# Patient Record
Sex: MALE | Race: WHITE | NOT HISPANIC OR LATINO | Employment: OTHER | URBAN - METROPOLITAN AREA
[De-identification: names, ages, dates, MRNs, and addresses within clinical notes are randomized per-mention and may not be internally consistent; named-entity substitution may affect disease eponyms.]

---

## 2017-01-02 ENCOUNTER — HOSPITAL ENCOUNTER (OUTPATIENT)
Dept: RADIOLOGY | Facility: HOSPITAL | Age: 72
Discharge: HOME | End: 2017-01-02
Attending: SURGERY
Payer: MEDICARE

## 2017-01-02 ENCOUNTER — APPOINTMENT (OUTPATIENT)
Dept: LAB | Facility: HOSPITAL | Age: 72
End: 2017-01-02
Attending: SURGERY
Payer: MEDICARE

## 2017-01-02 ENCOUNTER — TRANSCRIBE ORDERS (OUTPATIENT)
Dept: LAB | Facility: HOSPITAL | Age: 72
End: 2017-01-02

## 2017-01-02 ENCOUNTER — GENERIC CONVERSION - ENCOUNTER (OUTPATIENT)
Dept: OTHER | Facility: OTHER | Age: 72
End: 2017-01-02

## 2017-01-02 DIAGNOSIS — I71.4 ABDOMINAL AORTIC ANEURYSM WITHOUT RUPTURE (HCC): Primary | ICD-10-CM

## 2017-01-02 DIAGNOSIS — I71.4 ABDOMINAL AORTIC ANEURYSM WITHOUT RUPTURE (HCC): ICD-10-CM

## 2017-01-02 LAB
ABO GROUP BLD: NORMAL
ANION GAP SERPL CALCULATED.3IONS-SCNC: 7 MMOL/L (ref 4–13)
ATRIAL RATE: 48 BPM
ATRIAL RATE: 51 BPM
BLD GP AB SCN SERPL QL: NEGATIVE
BUN SERPL-MCNC: 19 MG/DL (ref 5–25)
CALCIUM SERPL-MCNC: 9.3 MG/DL (ref 8.3–10.1)
CHLORIDE SERPL-SCNC: 103 MMOL/L (ref 100–108)
CO2 SERPL-SCNC: 31 MMOL/L (ref 21–32)
CREAT SERPL-MCNC: 0.86 MG/DL (ref 0.6–1.3)
ERYTHROCYTE [DISTWIDTH] IN BLOOD BY AUTOMATED COUNT: 12.4 % (ref 11.6–15.1)
GFR SERPL CREATININE-BSD FRML MDRD: >60 ML/MIN/1.73SQ M
GLUCOSE SERPL-MCNC: 102 MG/DL (ref 65–140)
HCT VFR BLD AUTO: 51.1 % (ref 36.5–49.3)
HGB BLD-MCNC: 17.9 G/DL (ref 12–17)
INR PPP: 0.95 (ref 0.86–1.16)
MCH RBC QN AUTO: 32.4 PG (ref 26.8–34.3)
MCHC RBC AUTO-ENTMCNC: 35 G/DL (ref 31.4–37.4)
MCV RBC AUTO: 93 FL (ref 82–98)
P AXIS: 43 DEGREES
P AXIS: 53 DEGREES
PLATELET # BLD AUTO: 167 THOUSANDS/UL (ref 149–390)
PMV BLD AUTO: 9.7 FL (ref 8.9–12.7)
POTASSIUM SERPL-SCNC: 3.9 MMOL/L (ref 3.5–5.3)
PR INTERVAL: 176 MS
PR INTERVAL: 178 MS
PROTHROMBIN TIME: 12.8 SECONDS (ref 12–14.3)
QRS AXIS: 73 DEGREES
QRS AXIS: 87 DEGREES
QRSD INTERVAL: 132 MS
QRSD INTERVAL: 134 MS
QT INTERVAL: 444 MS
QT INTERVAL: 452 MS
QTC INTERVAL: 403 MS
QTC INTERVAL: 409 MS
RBC # BLD AUTO: 5.52 MILLION/UL (ref 3.88–5.62)
RH BLD: NEGATIVE
SODIUM SERPL-SCNC: 141 MMOL/L (ref 136–145)
T WAVE AXIS: 21 DEGREES
T WAVE AXIS: 26 DEGREES
VENTRICULAR RATE: 48 BPM
VENTRICULAR RATE: 51 BPM
WBC # BLD AUTO: 7.34 THOUSAND/UL (ref 4.31–10.16)

## 2017-01-02 PROCEDURE — 36415 COLL VENOUS BLD VENIPUNCTURE: CPT

## 2017-01-02 PROCEDURE — 86901 BLOOD TYPING SEROLOGIC RH(D): CPT

## 2017-01-02 PROCEDURE — 85610 PROTHROMBIN TIME: CPT

## 2017-01-02 PROCEDURE — 80048 BASIC METABOLIC PNL TOTAL CA: CPT

## 2017-01-02 PROCEDURE — 86900 BLOOD TYPING SEROLOGIC ABO: CPT

## 2017-01-02 PROCEDURE — 86850 RBC ANTIBODY SCREEN: CPT

## 2017-01-02 PROCEDURE — 93005 ELECTROCARDIOGRAM TRACING: CPT

## 2017-01-02 PROCEDURE — 85027 COMPLETE CBC AUTOMATED: CPT

## 2017-01-02 PROCEDURE — 71020 HB CHEST X-RAY 2VW FRONTAL&LATL: CPT

## 2017-01-05 RX ORDER — HYDROCHLOROTHIAZIDE 25 MG/1
25 TABLET ORAL DAILY
COMMUNITY

## 2017-01-05 RX ORDER — LISINOPRIL 10 MG/1
TABLET ORAL
COMMUNITY
Start: 2015-11-12

## 2017-01-05 RX ORDER — ATORVASTATIN CALCIUM 40 MG/1
40 TABLET, FILM COATED ORAL DAILY
COMMUNITY

## 2017-01-05 RX ORDER — OXYBUTYNIN CHLORIDE 5 MG/1
5 TABLET ORAL 2 TIMES DAILY
COMMUNITY
End: 2019-09-17 | Stop reason: ALTCHOICE

## 2017-01-05 RX ORDER — ASPIRIN 81 MG/1
81 TABLET, CHEWABLE ORAL DAILY
COMMUNITY

## 2017-01-05 RX ORDER — GLIPIZIDE 10 MG/1
10 TABLET, FILM COATED, EXTENDED RELEASE ORAL DAILY
Status: ON HOLD | COMMUNITY
End: 2017-01-06

## 2017-01-06 ENCOUNTER — HOSPITAL ENCOUNTER (INPATIENT)
Facility: HOSPITAL | Age: 72
LOS: 1 days | Discharge: HOME | DRG: 269 | End: 2017-01-07
Attending: SURGERY | Admitting: SURGERY
Payer: MEDICARE

## 2017-01-06 ENCOUNTER — ANESTHESIA (OUTPATIENT)
Dept: PERIOP | Facility: HOSPITAL | Age: 72
DRG: 269 | End: 2017-01-06
Payer: MEDICARE

## 2017-01-06 ENCOUNTER — HOSPITAL ENCOUNTER (OUTPATIENT)
Dept: RADIOLOGY | Facility: HOSPITAL | Age: 72
Discharge: HOME | DRG: 269 | End: 2017-01-06
Attending: SURGERY
Payer: MEDICARE

## 2017-01-06 ENCOUNTER — ANESTHESIA EVENT (OUTPATIENT)
Dept: PERIOP | Facility: HOSPITAL | Age: 72
DRG: 269 | End: 2017-01-06
Payer: MEDICARE

## 2017-01-06 ENCOUNTER — GENERIC CONVERSION - ENCOUNTER (OUTPATIENT)
Dept: OTHER | Facility: OTHER | Age: 72
End: 2017-01-06

## 2017-01-06 DIAGNOSIS — I71.4 ABDOMINAL AORTIC ANEURYSM WITHOUT RUPTURE (HCC): ICD-10-CM

## 2017-01-06 PROBLEM — I71.40 AAA (ABDOMINAL AORTIC ANEURYSM): Status: ACTIVE | Noted: 2017-01-06

## 2017-01-06 LAB
GLUCOSE SERPL-MCNC: 81 MG/DL (ref 65–140)
GLUCOSE SERPL-MCNC: 95 MG/DL (ref 65–140)

## 2017-01-06 PROCEDURE — C1769 GUIDE WIRE: HCPCS | Performed by: SURGERY

## 2017-01-06 PROCEDURE — 75952 HB ENDOVASC REPAIR ABDOM AORTA: CPT

## 2017-01-06 PROCEDURE — C1760 CLOSURE DEV, VASC: HCPCS | Performed by: SURGERY

## 2017-01-06 PROCEDURE — C1874 STENT, COATED/COV W/DEL SYS: HCPCS | Performed by: SURGERY

## 2017-01-06 PROCEDURE — 04V03DZ RESTRICTION OF ABDOMINAL AORTA WITH INTRALUMINAL DEVICE, PERCUTANEOUS APPROACH: ICD-10-PCS | Performed by: SURGERY

## 2017-01-06 PROCEDURE — 82948 REAGENT STRIP/BLOOD GLUCOSE: CPT

## 2017-01-06 PROCEDURE — C1894 INTRO/SHEATH, NON-LASER: HCPCS | Performed by: SURGERY

## 2017-01-06 PROCEDURE — C2628 CATHETER, OCCLUSION: HCPCS | Performed by: SURGERY

## 2017-01-06 DEVICE — IMPLANTABLE DEVICE: Type: IMPLANTABLE DEVICE | Site: AORTA | Status: FUNCTIONAL

## 2017-01-06 RX ORDER — BUPIVACAINE HYDROCHLORIDE AND EPINEPHRINE 5; 5 MG/ML; UG/ML
INJECTION, SOLUTION PERINEURAL AS NEEDED
Status: DISCONTINUED | OUTPATIENT
Start: 2017-01-06 | End: 2017-01-06 | Stop reason: HOSPADM

## 2017-01-06 RX ORDER — HEPARIN SODIUM 1000 [USP'U]/ML
INJECTION, SOLUTION INTRAVENOUS; SUBCUTANEOUS AS NEEDED
Status: DISCONTINUED | OUTPATIENT
Start: 2017-01-06 | End: 2017-01-06 | Stop reason: SURG

## 2017-01-06 RX ORDER — ONDANSETRON 2 MG/ML
4 INJECTION INTRAMUSCULAR; INTRAVENOUS EVERY 8 HOURS PRN
Status: DISCONTINUED | OUTPATIENT
Start: 2017-01-06 | End: 2017-01-07 | Stop reason: HOSPADM

## 2017-01-06 RX ORDER — GLYCOPYRROLATE 0.2 MG/ML
INJECTION INTRAMUSCULAR; INTRAVENOUS AS NEEDED
Status: DISCONTINUED | OUTPATIENT
Start: 2017-01-06 | End: 2017-01-06 | Stop reason: SURG

## 2017-01-06 RX ORDER — LISINOPRIL 10 MG/1
10 TABLET ORAL DAILY
Status: DISCONTINUED | OUTPATIENT
Start: 2017-01-07 | End: 2017-01-07 | Stop reason: HOSPADM

## 2017-01-06 RX ORDER — ATORVASTATIN CALCIUM 40 MG/1
40 TABLET, FILM COATED ORAL
Status: DISCONTINUED | OUTPATIENT
Start: 2017-01-07 | End: 2017-01-07 | Stop reason: HOSPADM

## 2017-01-06 RX ORDER — SODIUM CHLORIDE 450 MG/100ML
100 INJECTION, SOLUTION INTRAVENOUS CONTINUOUS
Status: DISCONTINUED | OUTPATIENT
Start: 2017-01-06 | End: 2017-01-06

## 2017-01-06 RX ORDER — PROPOFOL 10 MG/ML
INJECTION, EMULSION INTRAVENOUS AS NEEDED
Status: DISCONTINUED | OUTPATIENT
Start: 2017-01-06 | End: 2017-01-06 | Stop reason: SURG

## 2017-01-06 RX ORDER — OXYBUTYNIN CHLORIDE 5 MG/1
5 TABLET ORAL 2 TIMES DAILY
Status: DISCONTINUED | OUTPATIENT
Start: 2017-01-06 | End: 2017-01-07 | Stop reason: HOSPADM

## 2017-01-06 RX ORDER — OXYCODONE HYDROCHLORIDE 5 MG/1
10 TABLET ORAL EVERY 4 HOURS PRN
Status: DISCONTINUED | OUTPATIENT
Start: 2017-01-06 | End: 2017-01-07 | Stop reason: HOSPADM

## 2017-01-06 RX ORDER — FENTANYL CITRATE/PF 50 MCG/ML
50 SYRINGE (ML) INJECTION
Status: DISCONTINUED | OUTPATIENT
Start: 2017-01-06 | End: 2017-01-06 | Stop reason: HOSPADM

## 2017-01-06 RX ORDER — ONDANSETRON 2 MG/ML
4 INJECTION INTRAMUSCULAR; INTRAVENOUS EVERY 4 HOURS PRN
Status: DISCONTINUED | OUTPATIENT
Start: 2017-01-06 | End: 2017-01-06 | Stop reason: HOSPADM

## 2017-01-06 RX ORDER — OXYCODONE HYDROCHLORIDE 5 MG/1
5 TABLET ORAL EVERY 4 HOURS PRN
Status: DISCONTINUED | OUTPATIENT
Start: 2017-01-06 | End: 2017-01-07 | Stop reason: HOSPADM

## 2017-01-06 RX ORDER — ROCURONIUM BROMIDE 10 MG/ML
INJECTION, SOLUTION INTRAVENOUS AS NEEDED
Status: DISCONTINUED | OUTPATIENT
Start: 2017-01-06 | End: 2017-01-06 | Stop reason: SURG

## 2017-01-06 RX ORDER — SODIUM CHLORIDE 450 MG/100ML
100 INJECTION, SOLUTION INTRAVENOUS CONTINUOUS
Status: DISPENSED | OUTPATIENT
Start: 2017-01-06 | End: 2017-01-07

## 2017-01-06 RX ORDER — EPHEDRINE SULFATE 50 MG/ML
INJECTION, SOLUTION INTRAVENOUS AS NEEDED
Status: DISCONTINUED | OUTPATIENT
Start: 2017-01-06 | End: 2017-01-06 | Stop reason: SURG

## 2017-01-06 RX ORDER — FENTANYL CITRATE 50 UG/ML
INJECTION, SOLUTION INTRAMUSCULAR; INTRAVENOUS AS NEEDED
Status: DISCONTINUED | OUTPATIENT
Start: 2017-01-06 | End: 2017-01-06 | Stop reason: SURG

## 2017-01-06 RX ORDER — PROTAMINE SULFATE 10 MG/ML
INJECTION, SOLUTION INTRAVENOUS AS NEEDED
Status: DISCONTINUED | OUTPATIENT
Start: 2017-01-06 | End: 2017-01-06 | Stop reason: SURG

## 2017-01-06 RX ORDER — HYDROCHLOROTHIAZIDE 12.5 MG/1
25 TABLET ORAL DAILY
Status: DISCONTINUED | OUTPATIENT
Start: 2017-01-07 | End: 2017-01-07 | Stop reason: HOSPADM

## 2017-01-06 RX ORDER — HEPARIN SODIUM 5000 [USP'U]/ML
5000 INJECTION, SOLUTION INTRAVENOUS; SUBCUTANEOUS EVERY 8 HOURS SCHEDULED
Status: DISCONTINUED | OUTPATIENT
Start: 2017-01-06 | End: 2017-01-07 | Stop reason: HOSPADM

## 2017-01-06 RX ORDER — LIDOCAINE HYDROCHLORIDE 10 MG/ML
INJECTION, SOLUTION INFILTRATION; PERINEURAL AS NEEDED
Status: DISCONTINUED | OUTPATIENT
Start: 2017-01-06 | End: 2017-01-06 | Stop reason: SURG

## 2017-01-06 RX ORDER — ACETAMINOPHEN 325 MG/1
650 TABLET ORAL EVERY 6 HOURS PRN
Status: DISCONTINUED | OUTPATIENT
Start: 2017-01-06 | End: 2017-01-07 | Stop reason: HOSPADM

## 2017-01-06 RX ORDER — ONDANSETRON 2 MG/ML
INJECTION INTRAMUSCULAR; INTRAVENOUS AS NEEDED
Status: DISCONTINUED | OUTPATIENT
Start: 2017-01-06 | End: 2017-01-06 | Stop reason: SURG

## 2017-01-06 RX ORDER — ASPIRIN 81 MG/1
81 TABLET, CHEWABLE ORAL ONCE
Status: COMPLETED | OUTPATIENT
Start: 2017-01-06 | End: 2017-01-06

## 2017-01-06 RX ORDER — SODIUM CHLORIDE, SODIUM LACTATE, POTASSIUM CHLORIDE, CALCIUM CHLORIDE 600; 310; 30; 20 MG/100ML; MG/100ML; MG/100ML; MG/100ML
50 INJECTION, SOLUTION INTRAVENOUS CONTINUOUS
Status: DISCONTINUED | OUTPATIENT
Start: 2017-01-06 | End: 2017-01-06

## 2017-01-06 RX ORDER — SODIUM CHLORIDE 9 MG/ML
100 INJECTION, SOLUTION INTRAVENOUS CONTINUOUS
Status: DISCONTINUED | OUTPATIENT
Start: 2017-01-06 | End: 2017-01-07 | Stop reason: HOSPADM

## 2017-01-06 RX ORDER — OXYCODONE HYDROCHLORIDE AND ACETAMINOPHEN 5; 325 MG/1; MG/1
1 TABLET ORAL EVERY 4 HOURS PRN
Status: DISCONTINUED | OUTPATIENT
Start: 2017-01-06 | End: 2017-01-06

## 2017-01-06 RX ADMIN — ROCURONIUM BROMIDE 50 MG: 10 INJECTION, SOLUTION INTRAVENOUS at 16:37

## 2017-01-06 RX ADMIN — PROPOFOL 200 MG: 10 INJECTION, EMULSION INTRAVENOUS at 16:36

## 2017-01-06 RX ADMIN — EPHEDRINE SULFATE 10 MG: 50 INJECTION, SOLUTION INTRAMUSCULAR; INTRAVENOUS; SUBCUTANEOUS at 16:42

## 2017-01-06 RX ADMIN — EPHEDRINE SULFATE 10 MG: 50 INJECTION, SOLUTION INTRAMUSCULAR; INTRAVENOUS; SUBCUTANEOUS at 16:43

## 2017-01-06 RX ADMIN — HEPARIN SODIUM 5000 UNITS: 5000 INJECTION, SOLUTION INTRAVENOUS; SUBCUTANEOUS at 23:59

## 2017-01-06 RX ADMIN — OXYBUTYNIN CHLORIDE 5 MG: 5 TABLET ORAL at 23:59

## 2017-01-06 RX ADMIN — ROCURONIUM BROMIDE 20 MG: 10 INJECTION, SOLUTION INTRAVENOUS at 16:50

## 2017-01-06 RX ADMIN — EPHEDRINE SULFATE 10 MG: 50 INJECTION, SOLUTION INTRAMUSCULAR; INTRAVENOUS; SUBCUTANEOUS at 16:41

## 2017-01-06 RX ADMIN — LIDOCAINE HYDROCHLORIDE 50 MG: 10 INJECTION, SOLUTION INFILTRATION; PERINEURAL at 16:36

## 2017-01-06 RX ADMIN — PROPOFOL 50 MG: 10 INJECTION, EMULSION INTRAVENOUS at 16:37

## 2017-01-06 RX ADMIN — HEPARIN SODIUM 7000 UNITS: 1000 INJECTION INTRAVENOUS; SUBCUTANEOUS at 17:08

## 2017-01-06 RX ADMIN — PROTAMINE SULFATE 30 MG: 10 INJECTION, SOLUTION INTRAVENOUS at 18:16

## 2017-01-06 RX ADMIN — SODIUM CHLORIDE, SODIUM LACTATE, POTASSIUM CHLORIDE, AND CALCIUM CHLORIDE 50 ML/HR: .6; .31; .03; .02 INJECTION, SOLUTION INTRAVENOUS at 12:23

## 2017-01-06 RX ADMIN — FENTANYL CITRATE 50 MCG: 50 INJECTION, SOLUTION INTRAMUSCULAR; INTRAVENOUS at 18:53

## 2017-01-06 RX ADMIN — FENTANYL CITRATE 100 MCG: 50 INJECTION, SOLUTION INTRAMUSCULAR; INTRAVENOUS at 16:36

## 2017-01-06 RX ADMIN — SODIUM CHLORIDE 100 ML/HR: 0.9 INJECTION, SOLUTION INTRAVENOUS at 20:00

## 2017-01-06 RX ADMIN — PROPOFOL 30 MG: 10 INJECTION, EMULSION INTRAVENOUS at 16:48

## 2017-01-06 RX ADMIN — GLYCOPYRROLATE 0.4 MG: 0.2 INJECTION INTRAMUSCULAR; INTRAVENOUS at 18:14

## 2017-01-06 RX ADMIN — SODIUM CHLORIDE: 0.45 INJECTION, SOLUTION INTRAVENOUS at 16:33

## 2017-01-06 RX ADMIN — FENTANYL CITRATE 25 MCG: 50 INJECTION, SOLUTION INTRAMUSCULAR; INTRAVENOUS at 16:48

## 2017-01-06 RX ADMIN — FENTANYL CITRATE 50 MCG: 50 INJECTION, SOLUTION INTRAMUSCULAR; INTRAVENOUS at 19:00

## 2017-01-06 RX ADMIN — FENTANYL CITRATE 50 MCG: 50 INJECTION, SOLUTION INTRAMUSCULAR; INTRAVENOUS at 18:20

## 2017-01-06 RX ADMIN — CEFAZOLIN SODIUM 2000 MG: 1 SOLUTION INTRAVENOUS at 16:50

## 2017-01-06 RX ADMIN — PROTAMINE SULFATE 10 MG: 10 INJECTION, SOLUTION INTRAVENOUS at 18:20

## 2017-01-06 RX ADMIN — ONDANSETRON 4 MG: 2 INJECTION INTRAMUSCULAR; INTRAVENOUS at 18:14

## 2017-01-06 RX ADMIN — NEOSTIGMINE METHYLSULFATE 2 MG: 1 INJECTION INTRAMUSCULAR; INTRAVENOUS; SUBCUTANEOUS at 18:14

## 2017-01-06 RX ADMIN — ASPIRIN 81 MG 81 MG: 81 TABLET ORAL at 23:59

## 2017-01-07 VITALS
WEIGHT: 176 LBS | OXYGEN SATURATION: 95 % | SYSTOLIC BLOOD PRESSURE: 118 MMHG | TEMPERATURE: 99.9 F | DIASTOLIC BLOOD PRESSURE: 61 MMHG | HEIGHT: 69 IN | HEART RATE: 68 BPM | RESPIRATION RATE: 18 BRPM | BODY MASS INDEX: 26.07 KG/M2

## 2017-01-07 LAB
PLATELET # BLD AUTO: 119 THOUSANDS/UL (ref 149–390)
PMV BLD AUTO: 9.4 FL (ref 8.9–12.7)

## 2017-01-07 PROCEDURE — 85049 AUTOMATED PLATELET COUNT: CPT | Performed by: SURGERY

## 2017-01-07 RX ADMIN — HEPARIN SODIUM 5000 UNITS: 5000 INJECTION, SOLUTION INTRAVENOUS; SUBCUTANEOUS at 06:34

## 2017-01-07 RX ADMIN — LISINOPRIL 10 MG: 10 TABLET ORAL at 08:22

## 2017-01-07 RX ADMIN — SODIUM CHLORIDE 100 ML/HR: 0.9 INJECTION, SOLUTION INTRAVENOUS at 05:41

## 2017-01-07 RX ADMIN — HYDROCHLOROTHIAZIDE 25 MG: 12.5 TABLET ORAL at 08:22

## 2017-01-07 RX ADMIN — OXYBUTYNIN CHLORIDE 5 MG: 5 TABLET ORAL at 08:22

## 2017-01-12 ENCOUNTER — GENERIC CONVERSION - ENCOUNTER (OUTPATIENT)
Dept: OTHER | Facility: OTHER | Age: 72
End: 2017-01-12

## 2017-01-16 ENCOUNTER — ALLSCRIPTS OFFICE VISIT (OUTPATIENT)
Dept: OTHER | Facility: OTHER | Age: 72
End: 2017-01-16

## 2017-01-16 ENCOUNTER — TRANSCRIBE ORDERS (OUTPATIENT)
Dept: ADMINISTRATIVE | Facility: HOSPITAL | Age: 72
End: 2017-01-16

## 2017-01-16 DIAGNOSIS — Z95.828 BLOOD VESSEL REPLACED BY OTHER MEANS: Primary | ICD-10-CM

## 2017-02-06 ENCOUNTER — HOSPITAL ENCOUNTER (OUTPATIENT)
Dept: RADIOLOGY | Facility: HOSPITAL | Age: 72
Discharge: HOME | End: 2017-02-06
Attending: SURGERY
Payer: MEDICARE

## 2017-02-06 DIAGNOSIS — Z95.828 PRESENCE OF OTHER VASCULAR IMPLANTS AND GRAFTS: ICD-10-CM

## 2017-02-06 DIAGNOSIS — Z95.828 BLOOD VESSEL REPLACED BY OTHER MEANS: ICD-10-CM

## 2017-02-06 PROCEDURE — 74174 CTA ABD&PLVS W/CONTRAST: CPT

## 2017-02-06 RX ADMIN — IOHEXOL 100 ML: 350 INJECTION, SOLUTION INTRAVENOUS at 13:49

## 2017-02-07 LAB
ATRIAL RATE: 51 BPM
P AXIS: 53 DEGREES
PR INTERVAL: 176 MS
QRS AXIS: 87 DEGREES
QRSD INTERVAL: 132 MS
QT INTERVAL: 444 MS
QTC INTERVAL: 409 MS
T WAVE AXIS: 21 DEGREES
VENTRICULAR RATE: 51 BPM

## 2017-02-10 ENCOUNTER — GENERIC CONVERSION - ENCOUNTER (OUTPATIENT)
Dept: OTHER | Facility: OTHER | Age: 72
End: 2017-02-10

## 2017-04-10 ENCOUNTER — HOSPITAL ENCOUNTER (OUTPATIENT)
Dept: NON INVASIVE DIAGNOSTICS | Facility: CLINIC | Age: 72
Discharge: HOME | End: 2017-04-10
Payer: MEDICARE

## 2017-04-10 DIAGNOSIS — Z95.828 PRESENCE OF OTHER VASCULAR IMPLANTS AND GRAFTS: ICD-10-CM

## 2017-04-10 PROCEDURE — 93978 VASCULAR STUDY: CPT

## 2017-05-31 ENCOUNTER — ALLSCRIPTS OFFICE VISIT (OUTPATIENT)
Dept: OTHER | Facility: OTHER | Age: 72
End: 2017-05-31

## 2017-06-07 ENCOUNTER — HOSPITAL ENCOUNTER (OUTPATIENT)
Dept: NON INVASIVE DIAGNOSTICS | Facility: CLINIC | Age: 72
Discharge: HOME | End: 2017-06-07
Payer: MEDICARE

## 2017-06-07 DIAGNOSIS — Z95.2 PRESENCE OF PROSTHETIC HEART VALVE: ICD-10-CM

## 2017-06-07 DIAGNOSIS — E78.5 HYPERLIPIDEMIA: ICD-10-CM

## 2017-06-07 DIAGNOSIS — E78.00 PURE HYPERCHOLESTEROLEMIA: ICD-10-CM

## 2017-06-07 DIAGNOSIS — I25.10 ATHEROSCLEROTIC HEART DISEASE OF NATIVE CORONARY ARTERY WITHOUT ANGINA PECTORIS: ICD-10-CM

## 2017-06-07 PROCEDURE — 93306 TTE W/DOPPLER COMPLETE: CPT

## 2017-07-24 ENCOUNTER — ALLSCRIPTS OFFICE VISIT (OUTPATIENT)
Dept: OTHER | Facility: OTHER | Age: 72
End: 2017-07-24

## 2017-07-24 ENCOUNTER — TRANSCRIBE ORDERS (OUTPATIENT)
Dept: ADMINISTRATIVE | Facility: HOSPITAL | Age: 72
End: 2017-07-24

## 2017-07-24 DIAGNOSIS — I25.10 ATHEROSCLEROSIS OF NATIVE CORONARY ARTERY WITHOUT ANGINA PECTORIS, UNSPECIFIED WHETHER NATIVE OR TRANSPLANTED HEART: Primary | ICD-10-CM

## 2017-08-28 ENCOUNTER — HOSPITAL ENCOUNTER (OUTPATIENT)
Dept: NON INVASIVE DIAGNOSTICS | Facility: CLINIC | Age: 72
Discharge: HOME | End: 2017-08-28
Payer: MEDICARE

## 2017-08-28 DIAGNOSIS — Z95.828 PRESENCE OF OTHER VASCULAR IMPLANTS AND GRAFTS: ICD-10-CM

## 2017-08-28 PROCEDURE — 93978 VASCULAR STUDY: CPT

## 2017-10-30 ENCOUNTER — HOSPITAL ENCOUNTER (OUTPATIENT)
Dept: NON INVASIVE DIAGNOSTICS | Facility: CLINIC | Age: 72
Discharge: HOME | End: 2017-10-30
Payer: MEDICARE

## 2017-10-30 ENCOUNTER — TRANSCRIBE ORDERS (OUTPATIENT)
Dept: LAB | Facility: CLINIC | Age: 72
End: 2017-10-30

## 2017-10-30 ENCOUNTER — APPOINTMENT (OUTPATIENT)
Dept: LAB | Facility: CLINIC | Age: 72
End: 2017-10-30
Payer: MEDICARE

## 2017-10-30 DIAGNOSIS — R35.0 URINARY FREQUENCY: ICD-10-CM

## 2017-10-30 DIAGNOSIS — E78.5 HYPERLIPIDEMIA, UNSPECIFIED HYPERLIPIDEMIA TYPE: ICD-10-CM

## 2017-10-30 DIAGNOSIS — I71.4 ABDOMINAL AORTIC ANEURYSM WITHOUT RUPTURE (HCC): ICD-10-CM

## 2017-10-30 DIAGNOSIS — I25.10 ATHEROSCLEROSIS OF NATIVE CORONARY ARTERY, ANGINA PRESENCE UNSPECIFIED, UNSPECIFIED WHETHER NATIVE OR TRANSPLANTED HEART: ICD-10-CM

## 2017-10-30 DIAGNOSIS — F11.90 OPIATE USE: Primary | ICD-10-CM

## 2017-10-30 DIAGNOSIS — F11.90 OPIATE USE: ICD-10-CM

## 2017-10-30 LAB
ALBUMIN SERPL BCP-MCNC: 3.9 G/DL (ref 3.5–5)
ALP SERPL-CCNC: 62 U/L (ref 46–116)
ALT SERPL W P-5'-P-CCNC: 17 U/L (ref 12–78)
ANION GAP SERPL CALCULATED.3IONS-SCNC: 5 MMOL/L (ref 4–13)
AST SERPL W P-5'-P-CCNC: 9 U/L (ref 5–45)
BASOPHILS # BLD AUTO: 0 THOUSANDS/ΜL (ref 0–0.1)
BASOPHILS NFR BLD AUTO: 0 % (ref 0–1)
BILIRUB SERPL-MCNC: 0.5 MG/DL (ref 0.2–1)
BUN SERPL-MCNC: 21 MG/DL (ref 5–25)
CALCIUM SERPL-MCNC: 9.2 MG/DL (ref 8.3–10.1)
CHLORIDE SERPL-SCNC: 103 MMOL/L (ref 100–108)
CHOLEST SERPL-MCNC: 98 MG/DL (ref 50–200)
CO2 SERPL-SCNC: 33 MMOL/L (ref 21–32)
CREAT SERPL-MCNC: 0.8 MG/DL (ref 0.6–1.3)
CREAT UR-MCNC: 183 MG/DL
EOSINOPHIL # BLD AUTO: 0.22 THOUSAND/ΜL (ref 0–0.61)
EOSINOPHIL NFR BLD AUTO: 3 % (ref 0–6)
ERYTHROCYTE [DISTWIDTH] IN BLOOD BY AUTOMATED COUNT: 12.4 % (ref 11.6–15.1)
EST. AVERAGE GLUCOSE BLD GHB EST-MCNC: 128 MG/DL
GFR SERPL CREATININE-BSD FRML MDRD: 90 ML/MIN/1.73SQ M
GLUCOSE P FAST SERPL-MCNC: 123 MG/DL (ref 65–99)
HBA1C MFR BLD: 6.1 % (ref 4.2–6.3)
HCT VFR BLD AUTO: 49.4 % (ref 36.5–49.3)
HDLC SERPL-MCNC: 40 MG/DL (ref 40–60)
HGB BLD-MCNC: 16.8 G/DL (ref 12–17)
LDLC SERPL CALC-MCNC: 46 MG/DL (ref 0–100)
LYMPHOCYTES # BLD AUTO: 0.9 THOUSANDS/ΜL (ref 0.6–4.47)
LYMPHOCYTES NFR BLD AUTO: 13 % (ref 14–44)
MCH RBC QN AUTO: 31.5 PG (ref 26.8–34.3)
MCHC RBC AUTO-ENTMCNC: 34 G/DL (ref 31.4–37.4)
MCV RBC AUTO: 93 FL (ref 82–98)
MICROALBUMIN UR-MCNC: 26.1 MG/L (ref 0–20)
MICROALBUMIN/CREAT 24H UR: 14 MG/G CREATININE (ref 0–30)
MONOCYTES # BLD AUTO: 0.58 THOUSAND/ΜL (ref 0.17–1.22)
MONOCYTES NFR BLD AUTO: 8 % (ref 4–12)
NEUTROPHILS # BLD AUTO: 5.2 THOUSANDS/ΜL (ref 1.85–7.62)
NEUTS SEG NFR BLD AUTO: 76 % (ref 43–75)
PLATELET # BLD AUTO: 158 THOUSANDS/UL (ref 149–390)
PMV BLD AUTO: 9.4 FL (ref 8.9–12.7)
POTASSIUM SERPL-SCNC: 3.9 MMOL/L (ref 3.5–5.3)
PROT SERPL-MCNC: 6.8 G/DL (ref 6.4–8.2)
PSA SERPL-MCNC: 1.2 NG/ML (ref 0–4)
RBC # BLD AUTO: 5.34 MILLION/UL (ref 3.88–5.62)
SODIUM SERPL-SCNC: 141 MMOL/L (ref 136–145)
T4 FREE SERPL-MCNC: 1.24 NG/DL (ref 0.76–1.46)
TRIGL SERPL-MCNC: 58 MG/DL
TSH SERPL DL<=0.05 MIU/L-ACNC: 3.26 UIU/ML (ref 0.36–3.74)
WBC # BLD AUTO: 6.9 THOUSAND/UL (ref 4.31–10.16)

## 2017-10-30 PROCEDURE — 36415 COLL VENOUS BLD VENIPUNCTURE: CPT

## 2017-10-30 PROCEDURE — G0103 PSA SCREENING: HCPCS

## 2017-10-30 PROCEDURE — 84439 ASSAY OF FREE THYROXINE: CPT

## 2017-10-30 PROCEDURE — 83036 HEMOGLOBIN GLYCOSYLATED A1C: CPT

## 2017-10-30 PROCEDURE — 80053 COMPREHEN METABOLIC PANEL: CPT

## 2017-10-30 PROCEDURE — 86803 HEPATITIS C AB TEST: CPT

## 2017-10-30 PROCEDURE — 93978 VASCULAR STUDY: CPT

## 2017-10-30 PROCEDURE — 82043 UR ALBUMIN QUANTITATIVE: CPT | Performed by: INTERNAL MEDICINE

## 2017-10-30 PROCEDURE — 85025 COMPLETE CBC W/AUTO DIFF WBC: CPT

## 2017-10-30 PROCEDURE — 82570 ASSAY OF URINE CREATININE: CPT | Performed by: INTERNAL MEDICINE

## 2017-10-30 PROCEDURE — 80061 LIPID PANEL: CPT

## 2017-10-30 PROCEDURE — 84443 ASSAY THYROID STIM HORMONE: CPT

## 2017-10-31 DIAGNOSIS — I71.4 ABDOMINAL AORTIC ANEURYSM WITHOUT RUPTURE (HCC): ICD-10-CM

## 2017-10-31 LAB — HCV AB SER QL: NORMAL

## 2018-01-08 ENCOUNTER — ALLSCRIPTS OFFICE VISIT (OUTPATIENT)
Dept: OTHER | Facility: OTHER | Age: 73
End: 2018-01-08

## 2018-01-08 DIAGNOSIS — I77.1 STRICTURE OF ARTERY (HCC): ICD-10-CM

## 2018-01-09 NOTE — PROGRESS NOTES
Assessment   1  Cigarette nicotine dependence with nicotine-induced disorder (292 9) (F17 219)   2  Abdominal aortic aneurysm (AAA) >39 mm diameter (441 4) (I71 4)   3  Right iliac artery stenosis (447 1) (I77 1)    Plan   Cigarette nicotine dependence with nicotine-induced disorder    · You need to quit smoking ; Status:Complete;   Done: [id number]   Ordered; For:Cigarette nicotine dependence with nicotine-induced disorder; Ordered By:Rubina Zhao; History of repair of aneurysm of abdominal aorta using endovascular stent graft    · (1) BASIC METABOLIC PROFILE; Status:Active; Requested UDE:[id number];    Perform:[facility]; ZDC:[id number]; Last Updated By:Nunu Albert; 1/8/2018 10:58:35 AM;Ordered;For:History of repair of aneurysm of abdominal aorta using endovascular stent graft; Ordered By:Rubina Zhao;  Right iliac artery stenosis    · (1) BASIC METABOLIC PROFILE; Status:Active; Requested VLO:[id number]; Perform:[facility]; NMW:[id number]; Last Updated By:Nunu Albert; 1/8/2018 10:58:36 AM;Ordered; For:Right iliac artery stenosis; Ordered By:Rubina Zhao;   · (1) CBC/ PLT (NO DIFF); Status:Active; Requested LLV:[id number]; Perform:[facility]; FQY:[id number]; Last Updated By:Nunu Albert; 1/8/2018 10:58:36 AM;Ordered; For:Right iliac artery stenosis; Ordered By:Rubina Zhao;   · (1) PT WITH INR; Status:Active; Requested IRD:[id number]; Perform:[facility]; EVU:[id number]; Last Updated By:Nunu Albert; 1/8/2018 10:58:36 AM;Ordered; For:Right iliac artery stenosis; Ordered By:Rubina Zhao;   · ECG 12-LEAD; Status:Active; Requested RNF:[id number]; Perform:[facility]; UXM:[id number]; Last Updated By:Nunu Albert; 1/8/2018 10:58:36 AM;Ordered; For:Right iliac artery stenosis; Ordered By:Annette Zhao;   · Schedule Surgery Treatment  Procedure  Status: Hold For - Scheduling  Requested for:    [id number]   Ordered; For: Right iliac artery stenosis; Ordered By: Michael Hunt Performed:  Due: 44AAG3823; Last Updated By: Brandee Willson; 1/8/2018 11:19:08 AM    Discussion/Summary   Discussion Summary:    Status post endovascular aortic aneurysm repair on in January 2017  Zenith 16mm limb was placed in Both the right and left iliac arteries  He has had narrowing of the right limb at the distal aorta due to narrow diameter of the distal aorta  Over time the stenosis remains persistent  I had his case discussed in our multidisciplinary case conference and the consensus opinion was to treat this with additional bare-metal stenting at the aortic bifurcation to prevent any future complications of limb thrombosis  I explained the rationale of this procedure to the patient and he agrees  Plan to perform bilateral iliac angiograms with iliac angioplasty and stent  Risks of the procedure were discussed with the patient  Chief Complaint   Chief Complaint Free Text Note Form: I'm here to review my test results          History of Present Illness   HPI: Pt is here today to review EVAR Duplex done 10/30/17  Pt offers no complaints  Pt is taking aspirin daily  underwent endovascular aortic aneurysm repair in January 2017  He denies any claudication symptoms  He denies any rest pain in his legs  He denies any wounds in his legs  Recently has started smoking again about 10 cigarettes a day  He had quit for 6 years before  Review of Systems   Complete Male - Vasc:      Constitutional: No fever or chills, feels well, no tiredness, no recent weight gain or weight loss  Eyes: No sudden vision loss, no blurred vision, no double vision  ENT: no loss of hearing, no nosebleeds, no hoarseness  Cardiovascular: no painful veins,-- no leg pain with walking-- and-- no bleeding veins, but-- regular heart rate,-- no chest pain,-- no intermittent leg claudication-- and-- no palpitations        Respiratory: No sob, no wheezing, no cough, no sob with exertion, no orthopnea  Gastrointestinal: No nausea, No vomiting, no diarrhea, no blood in stool  Genitourinary: no dysuria, no hematuria, No urinary incontinence, no erectile dysfunction  Musculoskeletal: no limb pain, no limb swelling  Integumentary: no rash, no lesions, no wounds, no ulcer  Neurological: no dementia, no headache, no numbness, no limb weakness, no dizziness, no difficulty walking  Psychiatric: no depression, no mood disorders, no anxiety  Hematologic/Lymphatic: no bleeding disorder, no easy bruising  ROS Reviewed:    ROS reviewed  Active Problems   1  Abdominal aortic aneurysm (AAA) >39 mm diameter (441 4) (I71 4)   2  Aortic stenosis, severe (424 1) (I35 0)   3  Arteriosclerotic cardiovascular disease (429 2,440 9) (I25 10)   4  Cigarette nicotine dependence with nicotine-induced disorder (292 9) (F17 219)   5  Diabetes mellitus (250 00) (E11 9)   6  History of repair of aneurysm of abdominal aorta using endovascular stent graft (V43 4)     (Z95 828)   7  Hypercholesterolemia (272 0) (E78 00)   8  Hyperlipidemia (272 4) (E78 5)   9  Hypertension (401 9) (I10)   10  Other symptoms involving cardiovascular system (785 9) (R09 89)   11  Pre-op testing (V72 84) (Z01 818)   12  S/P AVR (V43 3) (Z95 2)    Past Medical History   1  History of Diverticulitis of large intestine with perforation with bleeding (198 60,430 83)     (K57 21)   2  History of Hearing deficit (V41 2) (H91 90)   3  History of abdominal aortic aneurysm (V12 59) (Z86 79)   4  History of type 2 diabetes mellitus (V12 29) (Z86 39)   5  History of Pneumonia (486)  Active Problems And Past Medical History Reviewed: The active problems and past medical history were reviewed and updated today  Surgical History   1  History of Abdominal Surgery   2  History of Aortic Valve Replacement   3  History of CABG   4  History of Colostomy Temporary   5   History of Endovascular Repair Infrarenal Abdominal Aorta Aneurysm   6  History of Tonsillectomy  Surgical History Reviewed: The surgical history was reviewed and updated today  Family History   Brother    1  Family history of cerebral infarction (V17 1) (Z82 3)  Family History    2  Family history of cerebral infarction (V17 1) (Z82 3)   3  Family history of hyperlipidemia (V18 19) (Z83 49)   4  Family history of malignant neoplasm (V16 9) (Z80 9)  Family History Reviewed: The family history was reviewed and updated today  Social History    · Alcohol use (V49 89) (Z78 9)   · Current every day smoker (305 1) (F17 200)   · Current occasional smoker (305 1) (Z72 0)   · Denied: Drug use (305 90) (F19 90)  Social History Reviewed: The social history was reviewed and updated today  Current Meds    1  Aspirin 81 MG TABS; Therapy: (Recorded:18May2016) to Recorded   2  BD Pen Needle Susan U/F 32G X 4 MM Miscellaneous; use 4 needles daily for insulin     injections MDD:4 TDD:4;     Therapy: 37RKT5189 to (Evaluate:11Mar2016)  Requested for: 05XXJ0005; Last     Rx:12Nov2015 Ordered   3  Citalopram Hydrobromide 40 MG Oral Tablet; TAKE 1 TABLET DAILY; Therapy: (Recorded:10Sep2015) to Recorded   4  HydroCHLOROthiazide 25 MG Oral Tablet; TAKE 1 TABLET DAILY; Therapy: (Recorded:13Jan2016) to Recorded   5  Invokana 100 MG Oral Tablet; TAKE TABLET Daily; Therapy: (Recorded:13Jan2016) to Recorded   6  Lisinopril 20 MG Oral Tablet; Therapy: (Recorded:18May2016) to Recorded   7  Oxybutynin Chloride 5 MG Oral Tablet; TAKE TABLET Twice daily; Therapy: (Recorded:13Jan2016) to Recorded   8  Simvastatin 40 MG Oral Tablet; Therapy: (Recorded:18May2016) to Recorded  Medication List Reviewed: The medication list was reviewed and updated today  Allergies   1   No Known Drug Allergies    Vitals   Vital Signs    Recorded: 12AXY1478 09:27AM   Temperature 96 9 F, Tympanic   Heart Rate 68, R Radial   Pulse Quality Normal, R Radial Respiration Quality Normal   Respiration 16   Systolic 054, LUE, Sitting   Diastolic 64, LUE, Sitting   Height 5 ft 9 in   Weight 164 lb    BMI Calculated 24 22   BSA Calculated 1 9     Physical Exam        Posterior tibialis: right 2+  Dorsalis pedis: left 2+  Distal Pulse Exam: Normal Capillary Refill  Extremities: No upper or lower extremity edema  LE Varicose Veins: No Varicose Veins are Present  The heart rate was normal  The rhythm was regular  Heart sounds: normal S1-- and-- normal S2       Murmurs: No murmurs were heard  Pulmonary      Respiratory effort: No increased work of breathing or signs of respiratory distress  Auscultation of lungs: Clear to auscultation  No wheezing, no rales, no rhonchi  Abdomen      Abdomen: Abdomen soft, non-tender, no masses, non distended, no rebound tenderness  No palpable aneurysm  Psychiatric      Orientation to person, place and time: Normal       Mood and affect: Normal       Eyes      Conjunctiva and lids: No swelling, erythema, or discharge  Ears, Nose, Mouth, and Throat      Hearing: Normal       Musculoskeletal      Gait and station: Normal       Skin      Skin and subcutaneous tissue: Normal without rashes or lesions  Venous Disease: No lipodermatosclerosis, stasis dermatitis, hyperpigmentation, or atrophie shanda noted on exam       Results/Data   Diagnostic Studies Reviewed Vasc: I personally reviewed the films/images/results in the office today  My interpretation follows  Vascular Study Review persistently elevated velocities in right iliac limb  CT Scan Review compression of right iliac limb resulting in stenosis  Surgery Scheduling Form   Vascular Surgery Scheduling Form [facility] Standard:         Location: [facility],-- OR Type: Hybrid Room Needed    Confirmation Number:    Procedure Date:    Requested Time:      Physician shabbir      Co-Surgeon:    Hospital [address] Required:      Bed: Outpatient- No Bed Required  Anesthesia: IV Sedation w/Anesthesia,-- Choice  PROCEDURE DETAILS      Procedure: angiogram, bilateral iliac artery stenting COOK rep  Laterality:    Anticipated frozen section:    Procedure Codes:    Pre-op diagnosis:    Diagnosis Code(s):    Case Length:      Equipment: Discovery Table Requested  Equipment Needs:    Implants/Representative:       REGISTRATION & FINANCIAL CLEARANCE      Amount Paid/Date:    FA Initials:    Insurance:    Policy Number: Group Number:       PRE-ADMISSION TESTING & CLINICAL INFORMATION    PAT Location:       Consults Needed    Anesthesia Consult:    Medical Consult:    Cardiac Consult:           ALLERGIES AND ALERTS    Latex Allergy:    Penicillin Allergy:    Malignant Hyperthermia:    Diabetic Patient:       COMMENTS    Scheduling Information Provided By:       IN OFFICE USE      Urgency: Standard (nonurgent)    Additional Bed Requirements:    CD to Hospital    Is the patient able to walk up a flight of stairs, walk up a hill or do heavy housework WITHOUT having chest pain or shortness of breath? Patient is currently taking Aspirin    does not need to hold Aspirin prior to procedure/surgery  Signatures    Electronically signed by :  Sylvia Lozano MD; Jan 8 2018  3:44PM EST                       (Author)

## 2018-01-10 ENCOUNTER — GENERIC CONVERSION - ENCOUNTER (OUTPATIENT)
Dept: OTHER | Facility: OTHER | Age: 73
End: 2018-01-10

## 2018-01-12 VITALS
DIASTOLIC BLOOD PRESSURE: 76 MMHG | RESPIRATION RATE: 18 BRPM | BODY MASS INDEX: 24.59 KG/M2 | WEIGHT: 166 LBS | SYSTOLIC BLOOD PRESSURE: 130 MMHG | HEART RATE: 76 BPM | HEIGHT: 69 IN

## 2018-01-12 NOTE — RESULT NOTES
Message   please let pt know CTA is as expected, good seal   plan to follow up with duplex in 6 months as per protocol  Verified Results  CTA ABDOMEN PELVIS W WO CONTRAST [id number] 12:49PM Ion Suazo     Test Name Result Flag Reference   CTA ABDOMEN PELVIS W WO CONTRAST (Report)     CT ANGIOGRAM OF THE ABDOMEN AND PELVIS WITH AND WITHOUT IV CONTRAST     INDICATION: Status post endograft placement 1/6/2017, for repair of abdominal aortic aneurysm   COMPARISON: Preoperative study November 2, 2016     TECHNIQUE: CT angiogram examination of the abdomen and pelvis was performed according to standard post endograft CTA protocol  This examination, like all CT scans performed in [facility], was performed utilizing techniques to    minimize radiation dose exposure, including the use of iterative reconstruction and automated exposure control  Contrast as well as noncontrast images were obtained  3D reconstructions were performed an independent workstation, and are supplied for    review  IV Contrast: 100 mL Omnipaque 350 Note: (SINGLE DOSE/MULTI DOSE) information refers to the container from which the contrast was acquired  Contrast was injected one time intravenously without immediate complication  FINDINGS:     VASCULAR STRUCTURES:  Status post endovascular repair of aortic aneurysm  Celiac artery, superior mesenteric artery, bilateral renal arteries remain widely patent  No endoleak is seen  No sac enhancement is measured  The aneurysm sac measures    slightly greater than it did on the preop CTA measuring 54 x 51 mm compared to 53 x 50 mm  The right graft limb is slightly compressed relative to the left as it passes across the aortic bifurcation  Distal attachment sites are unremarkable  Both    internal iliac arteries remain patent  There is no significant external or common femoral stenosis  No evidence of access site complication   Surgical clips are seen on the right groin from open cutdown  OTHER FINDINGS:     ABDOMEN     LOWER CHEST:  No significant abnormality in the lung bases  LIVER/BILIARY TREE:  Unremarkable  GALLBLADDER: Small gallstones  No pericholecystic inflammatory change  SPLEEN:  Unremarkable  Normal size  PANCREAS:  Unremarkable  ADRENAL GLANDS: Stable thickening of both adrenal glands     KIDNEYS/URETERS: No solid renal mass  No hydronephrosis  No urinary tract calculi  Stable cysts and additional smaller low-density lesions  PELVIS     REPRODUCTIVE ORGANS: Unremarkable for patient's age  URINARY BLADDER: Unremarkable  ADDITIONAL ABDOMINAL AND PELVIC STRUCTURES     STOMACH AND BOWEL: Evidence of prior sigmoid resection  No evidence of obstruction  ABDOMINOPELVIC CAVITY: No pathologically enlarged mesenteric or retroperitoneal lymph nodes  No ascites or free intraperitoneal air  ABDOMINAL WALL/INGUINAL REGIONS:  Postoperative changes in the right groin     OSSEOUS STRUCTURES: No acute fracture or destructive osseous lesion  IMPRESSION:      1  Status post bifurcated endograft repair of abdominal aorta  The current sac size is 54 mm, which measures slightly greater than the preoperative study measurement of 53 mm  2  There is no endoleak present  3  The graft position is stable, with no evidence of migration           Workstation performed: [id number]     Signed by:   Cameron Hong MD   2/6/17

## 2018-01-15 VITALS
SYSTOLIC BLOOD PRESSURE: 110 MMHG | HEIGHT: 69 IN | BODY MASS INDEX: 24.36 KG/M2 | OXYGEN SATURATION: 97 % | DIASTOLIC BLOOD PRESSURE: 64 MMHG | HEART RATE: 70 BPM | WEIGHT: 164.44 LBS

## 2018-01-15 VITALS
BODY MASS INDEX: 24.88 KG/M2 | HEIGHT: 69 IN | RESPIRATION RATE: 16 BRPM | HEART RATE: 74 BPM | WEIGHT: 168 LBS | SYSTOLIC BLOOD PRESSURE: 136 MMHG | DIASTOLIC BLOOD PRESSURE: 74 MMHG

## 2018-01-17 NOTE — MISCELLANEOUS
Message  wife left  req results from doppler last week  she said we could lmom  the results had been assigned to Dr Vaughn Taylor, she was away  I s/w her and she had not seen this pt and is reassigning to Dr Mayuri Phelps, as he sees pt however she did say to tell the pt there had been no signif change  called pt and lmom informing of same, and that they should be receiving doppler letter, if they do not receive by end of next week or have any further questions they are to call me back  Active Problems    1  Abdominal aortic aneurysm (AAA) >39 mm diameter (441 4) (I71 4)   2  Aortic stenosis, severe (424 1) (I35 0)   3  Arteriosclerotic cardiovascular disease (429 2,440 9) (I25 10)   4  Cigarette nicotine dependence with nicotine-induced disorder (292 9) (F17 219)   5  Diabetes mellitus (250 00) (E11 9)   6  Hypercholesterolemia (272 0) (E78 0)   7  Hyperlipidemia (272 4) (E78 5)   8  Hypertension (401 9) (I10)   9  Other symptoms involving cardiovascular system (785 9) (R09 89)   10  Pre-op testing (V72 84) (Z01 818)   11  S/P AVR (V43 3) (Z95 2)    Current Meds   1  Aspirin 81 MG TABS; Therapy: (Recorded:40Cmx9744) to Recorded   2  Atorvastatin Calcium 40 MG Oral Tablet; TAKE 1 TABLET DAILY AT BEDTIME; Therapy: 75JOF8148 to (Evaluate:11Mar2016)  Requested for: 98RFD1039; Last   Rx:12Nov2015 Ordered   3  BD Pen Needle Susan U/F 32G X 4 MM Miscellaneous; use 4 needles daily for insulin   injections MDD:4 TDD:4;   Therapy: 74WPJ1875 to (Evaluate:11Mar2016)  Requested for: 91WGE5018; Last   Rx:12Nov2015 Ordered   4  Citalopram Hydrobromide 40 MG Oral Tablet; TAKE 1 TABLET DAILY; Therapy: (Recorded:65Yoz0165) to Recorded   5  Hydrochlorothiazide 25 MG Oral Tablet; TAKE 1 TABLET DAILY; Therapy: (Recorded:13Jan2016) to Recorded   6  Invokana 100 MG Oral Tablet; TAKE TABLET Daily; Therapy: (Recorded:13Jan2016) to Recorded   7  Lisinopril 20 MG Oral Tablet; Therapy: (Recorded:64Gig4287) to Recorded   8   Oxybutynin Chloride 5 MG Oral Tablet; TAKE TABLET Twice daily; Therapy: (Recorded:95Xna2877) to Recorded   9  Simvastatin 40 MG Oral Tablet; Therapy: (Recorded:78Jks8965) to Recorded    Allergies    1   No Known Drug Allergies    Signatures   Electronically signed by : Titi Mallory, ; Sep  1 2016  5:02PM EST                       (Author)

## 2018-01-22 ENCOUNTER — GENERIC CONVERSION - ENCOUNTER (OUTPATIENT)
Dept: OTHER | Facility: OTHER | Age: 73
End: 2018-01-22

## 2018-01-22 ENCOUNTER — APPOINTMENT (OUTPATIENT)
Dept: LAB | Facility: HOSPITAL | Age: 73
End: 2018-01-22
Attending: SURGERY
Payer: MEDICARE

## 2018-01-22 ENCOUNTER — TRANSCRIBE ORDERS (OUTPATIENT)
Dept: LAB | Facility: HOSPITAL | Age: 73
End: 2018-01-22

## 2018-01-22 ENCOUNTER — OFFICE VISIT (OUTPATIENT)
Dept: LAB | Facility: HOSPITAL | Age: 73
End: 2018-01-22
Attending: SURGERY
Payer: MEDICARE

## 2018-01-22 DIAGNOSIS — I77.1 STRICTURE OF ARTERY (HCC): ICD-10-CM

## 2018-01-22 DIAGNOSIS — I77.1 STRICTURE OF ARTERY (HCC): Primary | ICD-10-CM

## 2018-01-22 LAB
ANION GAP SERPL CALCULATED.3IONS-SCNC: 4 MMOL/L (ref 4–13)
ATRIAL RATE: 48 BPM
BUN SERPL-MCNC: 20 MG/DL (ref 5–25)
CALCIUM SERPL-MCNC: 9.8 MG/DL (ref 8.3–10.1)
CHLORIDE SERPL-SCNC: 104 MMOL/L (ref 100–108)
CO2 SERPL-SCNC: 33 MMOL/L (ref 21–32)
CREAT SERPL-MCNC: 0.78 MG/DL (ref 0.6–1.3)
ERYTHROCYTE [DISTWIDTH] IN BLOOD BY AUTOMATED COUNT: 12 % (ref 11.6–15.1)
GFR SERPL CREATININE-BSD FRML MDRD: 91 ML/MIN/1.73SQ M
GLUCOSE SERPL-MCNC: 95 MG/DL (ref 65–140)
HCT VFR BLD AUTO: 48.2 % (ref 36.5–49.3)
HGB BLD-MCNC: 17.1 G/DL (ref 12–17)
INR PPP: 1 (ref 0.86–1.16)
MCH RBC QN AUTO: 32.8 PG (ref 26.8–34.3)
MCHC RBC AUTO-ENTMCNC: 35.5 G/DL (ref 31.4–37.4)
MCV RBC AUTO: 92 FL (ref 82–98)
P AXIS: 0 DEGREES
PLATELET # BLD AUTO: 148 THOUSANDS/UL (ref 149–390)
PMV BLD AUTO: 9.6 FL (ref 8.9–12.7)
POTASSIUM SERPL-SCNC: 4.5 MMOL/L (ref 3.5–5.3)
PR INTERVAL: 180 MS
PROTHROMBIN TIME: 13.2 SECONDS (ref 12.1–14.4)
QRS AXIS: -22 DEGREES
QRSD INTERVAL: 130 MS
QT INTERVAL: 448 MS
QTC INTERVAL: 400 MS
RBC # BLD AUTO: 5.22 MILLION/UL (ref 3.88–5.62)
SODIUM SERPL-SCNC: 141 MMOL/L (ref 136–145)
T WAVE AXIS: 15 DEGREES
VENTRICULAR RATE: 48 BPM
WBC # BLD AUTO: 6.53 THOUSAND/UL (ref 4.31–10.16)

## 2018-01-22 PROCEDURE — 85610 PROTHROMBIN TIME: CPT

## 2018-01-22 PROCEDURE — 80048 BASIC METABOLIC PNL TOTAL CA: CPT

## 2018-01-22 PROCEDURE — 85027 COMPLETE CBC AUTOMATED: CPT

## 2018-01-22 PROCEDURE — 93005 ELECTROCARDIOGRAM TRACING: CPT

## 2018-01-22 PROCEDURE — 36415 COLL VENOUS BLD VENIPUNCTURE: CPT

## 2018-01-23 ENCOUNTER — GENERIC CONVERSION - ENCOUNTER (OUTPATIENT)
Dept: OTHER | Facility: OTHER | Age: 73
End: 2018-01-23

## 2018-01-23 VITALS
TEMPERATURE: 96.9 F | BODY MASS INDEX: 24.29 KG/M2 | SYSTOLIC BLOOD PRESSURE: 120 MMHG | HEART RATE: 68 BPM | DIASTOLIC BLOOD PRESSURE: 64 MMHG | WEIGHT: 164 LBS | RESPIRATION RATE: 16 BRPM | HEIGHT: 69 IN

## 2018-01-23 NOTE — PROGRESS NOTES
Preliminary Nursing Report                Patient Information    Initial Encounter Entry Date:   1/10/2018 5:45 PM EST (Automated Transmission Automated Transmission)       Last Modified:   {Ania Ceja}              Legal Name: Vadim Randolph        Social Security Number:        YOB: 1945        Age (years): 71        Gender: M        Body Mass Index (BMI): 24 kg/m2        Height: 69 in  Weight: 164 lbs (74 kgs)           Address:   [facility] [id number]               Phone: [phone]   (consent to leave messages)        Email:        Ethnicity: Decline to State        Anabaptist:        Marital Status:        Preferred Language: English        Race: Other Race                    Patient Insurance Information        Primary Insurance Information Carrier Name: {Primary  CarrierName}           Carrier Address:   {Primary  CarrierAddress}              Carrier Phone: {Primary  CarrierPhone}          Group Number: {Primary  GroupNumber}          Policy Number: {Primary  PolicyNumber}          Insured Name: {Primary  InsuredName}          Insured : {Primary  InsuredDOB}          Relationship to Insured: {Primary  RelationshiptoInsured}           Secondary Insurance Information Carrier Name: {Secondary  CarrierName}           Carrier Address:   {Secondary  CarrierAddress}              Carrier Phone: {Secondary  CarrierPhone}          Group Number: {Secondary  GroupNumber}          Policy Number: {Secondary  PolicyNumber}          Insured Name: {Secondary  InsuredName}          Insured : {Secondary  InsuredDOB}          Relationship to Insured: {Secondary  RelationshiptoInsured}                       Health Profile   Booking #:   Zoe Gordon #: [id number]               DOS: 2018    Surgery : Selective catheter placement, arterial system; initial third order or more selective abdominal, pelvic, or lower extremity artery branch, within a vascular family    Add'l Procedures/Notes:     Surgery Risk: Minor          Precautions     Aortic stenosis, severe       Diabetes mellitus                   Allergies    No Known Drug Allergies             Medications    Aspirin 81 MG TABS       Citalopram Hydrobromide 40 MG Oral Tablet       HydroCHLOROthiazide 25 MG Oral Tablet       Invokana 100 MG Oral Tablet       Lisinopril 20 MG Oral Tablet       Oxybutynin Chloride 5 MG Oral Tablet       Simvastatin 40 MG Oral Tablet               Conditions    Abdominal aortic aneurysm (AAA) >39 mm diameter       Aortic stenosis, severe       Arteriosclerotic cardiovascular disease       Cigarette nicotine dependence with nicotine-induced disorder       Diabetes mellitus       History of repair of aneurysm of abdominal aorta using endovascular stent graft       Hypercholesterolemia       Hyperlipidemia       Hypertension       Other symptoms involving cardiovascular system       Pre-op testing       Right iliac artery stenosis       S/P AVR               Family History    None             Surgical History    None             Social History    Alcohol use       Current every day smoker       Current occasional smoker       Denies Drug use                               Patient Instructions       Medical Procedure Risk  NPO Instructions   The day before surgery it is recommended to have a light dinner at your usual time and you are allowed a light snack early in the evening  Do not eat anything heavy or eat a big meal after 7pm  Do not eat or drink anything after midnight prior to your surgery  If you are supposed to take any of your medications, do so with a sip of water  Failure to follow these instructions can lead to an increased risk of lung complications and may result in a delay or cancellation of your procedure  If you have any questions, contact your institution for further instructions   No candy, no gum, no mints, no chewing tobacco          Lisinopril 20 MG Oral Tablet  Medication Instruction (ACE/ARB - Blood Pressure Medication) 1  Please continue the following medications up to the evening before surgery, but do not take it on the day of surgery  Please restart your medications as soon as clinically feasible  Aspirin 81 MG TABS  Medication Instruction (Aspirin - Blood Thinners) 112, 104, 105, 114, 113, 103, 110, 107, 108, 109, 111, 106  Please continue to take this medication on your normal schedule  If this is an oral medication and you take in the morning, you may do so with a sip of water  However, your surgeon may have you stop aspirin up to a week early if you are having intracranial, middle ear, posterior eye, spine surgery or prostate surgery  HydroCHLOROthiazide 25 MG Oral Tablet  Medication Instruction (Diuretics - Water Pills) 28, 29  Please continue the following medications up to the evening before surgery, but do not take it on the day of surgery  Citalopram Hydrobromide 40 MG Oral Tablet  Medication Instruction (SSRI - Antidepressants) 80  Please continue to take this medication on your normal schedule  If this is an oral medication and you take in the morning, you may do so with a sip of water  Simvastatin 40 MG Oral Tablet  Medication Instruction (Cholesterol Medication) 81, 82  Please continue to take this medication on your normal schedule  If this is an oral medication and you take in the morning, you may do so with a sip of water  Current occasional smoker, Current every day smoker  Smoking Cessation   Smoking before and after surgery can lead to complications  Patients who quit smoking at least eight weeks before surgery have complication rates almost as low as non-smokers  Smokers who can stop smoking 24 or 48 hours before surgery may also benefit from decreased amounts of nicotine and carbon monoxide in the body   For help quitting smoking, speak with your physician or contact the [facility] [address] or American [employer]  Please visit the following web address for assistance with quitting  [facility] [employer]  [facility]/Anesthesia-Topics/Upg-Hko-po-Quit-Smoking  aspx  Testing Considerations       ? Blood Glucose on Day of Surgery t  Please check the blood sugar on the morning of surgery  Triggered by: Diabetes mellitus               Consultations       ? Cardiac Consult (Major Valve)   If the patient has moderate or severe valvular stenosis or regurgitation then a cardiac consult is indicated  It is recommended that the patient undergo a preoperative echocardiography if there has been either:  1) no prior echocardiography within 1 year or   2) a significant change in clinical status or physical examination since last evaluation   - For adults who meet standard indications for valvular intervention (replacement and repair) on the basis of symptoms and severity of stenosis or regurgitation, valvular intervention before elective non-cardiac surgery is effective in reducing perioperative risk  - appropriate intraoperative and postoperative hemodynamic monitoring is reasonable in adults with asymptomatic severe valvular disease   - Antibiotic prophylaxis will likely be required  Triggered by: Aortic stenosis, severe               Miscellaneous Questions         Question: Are you able to walk up a flight of stairs, walk up a hill or do heavy housework WITHOUT having chest pain or shortness of breath? Answer: YES                   Allergies/Conditions/Medications Not Found        The following were not recognized by our system when generating the recommendations  Please consider if this would impact any preoperative protocols  ? Alcohol use       ? Invokana 100 MG Oral Tablet                  Appointment Information         Date:    01/26/2018        Location:    [facility]        Address:           Directions:                      Footnotes revision 14      ?? Denotes a free-text entry        Legal Disclaimer: Any and all recommendations and services provided herein are designed to assist in the preoperative care of the patient  Nothing contained herein is designed to replace, eliminate or alleviate the responsibility of the attending physician to supervise and determine the patient?s preoperative care and course of treatment  Failure to provide complete, accurate information may negatively impact the system?s ability to recommend the proper preoperative protocol  THE ATTENDING PHYSICIAN IS RESPONSIBLE TO REVIEW THE SUGGESTED PREOPERATIVE PROTOCOLS/COURSE OF TREATMENT AND PRESCRIBE THE FINAL COURSE OF PREOPERATIVE TREATMENT IN CONSULTATION WITH THE PATIENT  THE ePREOP SYSTEM AND ITS MATERIALS ARE PROVIDED ? AS IS? WITHOUT WARRANTY OF ANY KIND, EXPRESS OR IMPLIED, INCLUDING, BUT NOT LIMITED TO, WARRANTIES OF PERFORMANCE OR MERCHANTABILITY OR FITNESS FOR A PARTICULAR PURPOSE  PATIENT AND PHYSICIANS HEREBY AGREE THAT THEIR USE OF THE MATERIALS AND RESOURCES ACT AS A CONSENT TO RELEASE AND WAIVE ePREOP FROM ANY AND ALL CLAIMS OF WARRANTY, TORT OR CONTRACT LAW OF ANY KIND

## 2018-01-24 NOTE — MISCELLANEOUS
Message   Recorded as Task   Date: 01/08/2018 10:07 AM, Created By: System   Task Name: Schedule Appointment   Assigned To: vascular surgery scheduling,Team   Regarding Patient: Analilia Julien, Status: Active   Comment:    System - 08 Jan 2018 10:07 AM        Saintclair Floyd - 08 Jan 2018 10:55 AM     TASK REASSIGNED: Previously Assigned To Rubina Zhao        Active Problems    1  Abdominal aortic aneurysm (AAA) >39 mm diameter (441 4) (I71 4)   2  Aortic stenosis, severe (424 1) (I35 0)   3  Arteriosclerotic cardiovascular disease (429 2,440 9) (I25 10)   4  Cigarette nicotine dependence with nicotine-induced disorder (292 9) (F17 219)   5  Diabetes mellitus (250 00) (E11 9)   6  History of repair of aneurysm of abdominal aorta using endovascular stent graft (V43 4)   (Z95 828)   7  Hypercholesterolemia (272 0) (E78 00)   8  Hyperlipidemia (272 4) (E78 5)   9  Hypertension (401 9) (I10)   10  Other symptoms involving cardiovascular system (785 9) (R09 89)   11  Pre-op testing (V72 84) (Z01 818)   12  Right iliac artery stenosis (447 1) (I77 1)   13  S/P AVR (V43 3) (Z95 2)    Current Meds   1  Aspirin 81 MG TABS; Therapy: (Recorded:18May2016) to Recorded   2  BD Pen Needle Susan U/F 32G X 4 MM Miscellaneous; use 4 needles daily for insulin   injections MDD:4 TDD:4;   Therapy: 81NAN3281 to (Evaluate:11Mar2016)  Requested for: 40GLC2811; Last   Rx:12Nov2015 Ordered   3  Citalopram Hydrobromide 40 MG Oral Tablet; TAKE 1 TABLET DAILY; Therapy: (Recorded:07Ewy9358) to Recorded   4  HydroCHLOROthiazide 25 MG Oral Tablet; TAKE 1 TABLET DAILY; Therapy: (Recorded:13Jan2016) to Recorded   5  Invokana 100 MG Oral Tablet; TAKE TABLET Daily; Therapy: (Recorded:13Jan2016) to Recorded   6  Lisinopril 20 MG Oral Tablet; Therapy: (Recorded:18May2016) to Recorded   7  Oxybutynin Chloride 5 MG Oral Tablet; TAKE TABLET Twice daily; Therapy: (Recorded:13Jan2016) to Recorded   8   Simvastatin 40 MG Oral Tablet; Therapy: (Recorded:17Qtk3109) to Recorded    Allergies    1  No Known Drug Allergies    Signatures   Electronically signed by :  Erlinda Herrera, ; Jan 23 2018  7:30PM EST                       (Author)

## 2018-01-25 ENCOUNTER — ANESTHESIA EVENT (OUTPATIENT)
Dept: PERIOP | Facility: HOSPITAL | Age: 73
End: 2018-01-25
Payer: MEDICARE

## 2018-01-25 NOTE — PRE-PROCEDURE INSTRUCTIONS
Pre-Surgery Instructions:   Medication Instructions    aspirin 81 mg chewable tablet Patient was instructed by Physician and understands   atorvastatin (LIPITOR) 40 mg tablet Instructed patient per Anesthesia Guidelines   canagliflozin (INVOKANA) 100 mg Instructed patient per Anesthesia Guidelines   CITALOPRAM HYDROBROMIDE PO Instructed patient per Anesthesia Guidelines   hydrochlorothiazide (HYDRODIURIL) 25 mg tablet Instructed patient per Anesthesia Guidelines   lisinopril (ZESTRIL) 10 mg tablet Instructed patient per Anesthesia Guidelines   oxybutynin (DITROPAN) 5 mg tablet Instructed patient per Anesthesia Guidelines  REVIEWED  PRINTED SURGICAL INSTRUCTIONS WITH PATIENT , PATIENT VERBALIZED UNDERSTANDING   MEDICATIONS REVIEWED

## 2018-01-26 ENCOUNTER — ANESTHESIA (OUTPATIENT)
Dept: PERIOP | Facility: HOSPITAL | Age: 73
End: 2018-01-26
Payer: MEDICARE

## 2018-01-26 ENCOUNTER — HOSPITAL ENCOUNTER (OUTPATIENT)
Facility: HOSPITAL | Age: 73
Setting detail: OUTPATIENT SURGERY
Discharge: HOME | End: 2018-01-26
Attending: SURGERY | Admitting: SURGERY
Payer: MEDICARE

## 2018-01-26 ENCOUNTER — APPOINTMENT (OUTPATIENT)
Dept: RADIOLOGY | Facility: HOSPITAL | Age: 73
End: 2018-01-26
Attending: SURGERY
Payer: MEDICARE

## 2018-01-26 VITALS
HEART RATE: 83 BPM | DIASTOLIC BLOOD PRESSURE: 76 MMHG | TEMPERATURE: 100.1 F | RESPIRATION RATE: 16 BRPM | WEIGHT: 160 LBS | HEIGHT: 69 IN | OXYGEN SATURATION: 93 % | BODY MASS INDEX: 23.7 KG/M2 | SYSTOLIC BLOOD PRESSURE: 142 MMHG

## 2018-01-26 DIAGNOSIS — Z95.828 H/O ENDOVASCULAR STENT GRAFT FOR ABDOMINAL AORTIC ANEURYSM: ICD-10-CM

## 2018-01-26 DIAGNOSIS — I71.4 ABDOMINAL AORTIC ANEURYSM (AAA) WITHOUT RUPTURE (HCC): ICD-10-CM

## 2018-01-26 DIAGNOSIS — I77.1 ILIAC ARTERY STENOSIS, RIGHT (HCC): Primary | ICD-10-CM

## 2018-01-26 LAB
GLUCOSE SERPL-MCNC: 123 MG/DL (ref 65–140)
GLUCOSE SERPL-MCNC: 93 MG/DL (ref 65–140)

## 2018-01-26 PROCEDURE — C1769 GUIDE WIRE: HCPCS | Performed by: SURGERY

## 2018-01-26 PROCEDURE — 82948 REAGENT STRIP/BLOOD GLUCOSE: CPT

## 2018-01-26 PROCEDURE — C1894 INTRO/SHEATH, NON-LASER: HCPCS | Performed by: SURGERY

## 2018-01-26 PROCEDURE — C1725 CATH, TRANSLUMIN NON-LASER: HCPCS | Performed by: SURGERY

## 2018-01-26 PROCEDURE — C1760 CLOSURE DEV, VASC: HCPCS | Performed by: SURGERY

## 2018-01-26 PROCEDURE — 76937 US GUIDE VASCULAR ACCESS: CPT

## 2018-01-26 PROCEDURE — C1887 CATHETER, GUIDING: HCPCS | Performed by: SURGERY

## 2018-01-26 PROCEDURE — 37220 PR REVASCULARIZE ILIAC ARTERY,ANGIOPLASTY, INITIAL VESSEL: CPT | Performed by: SURGERY

## 2018-01-26 DEVICE — CLOSURE DEVICE MYNX ACE 6F/7FR: Type: IMPLANTABLE DEVICE | Site: GROIN | Status: FUNCTIONAL

## 2018-01-26 RX ORDER — GLYCOPYRROLATE 0.2 MG/ML
INJECTION INTRAMUSCULAR; INTRAVENOUS AS NEEDED
Status: DISCONTINUED | OUTPATIENT
Start: 2018-01-26 | End: 2018-01-26 | Stop reason: SURG

## 2018-01-26 RX ORDER — PROTAMINE SULFATE 10 MG/ML
INJECTION, SOLUTION INTRAVENOUS AS NEEDED
Status: DISCONTINUED | OUTPATIENT
Start: 2018-01-26 | End: 2018-01-26 | Stop reason: SURG

## 2018-01-26 RX ORDER — ACETAMINOPHEN 325 MG/1
TABLET ORAL
Qty: 30 TABLET | Refills: 0
Start: 2018-01-26

## 2018-01-26 RX ORDER — ROCURONIUM BROMIDE 10 MG/ML
INJECTION, SOLUTION INTRAVENOUS AS NEEDED
Status: DISCONTINUED | OUTPATIENT
Start: 2018-01-26 | End: 2018-01-26 | Stop reason: SURG

## 2018-01-26 RX ORDER — CHLORHEXIDINE GLUCONATE 0.12 MG/ML
15 RINSE ORAL ONCE
Status: DISCONTINUED | OUTPATIENT
Start: 2018-01-26 | End: 2018-01-26 | Stop reason: HOSPADM

## 2018-01-26 RX ORDER — ONDANSETRON 2 MG/ML
4 INJECTION INTRAMUSCULAR; INTRAVENOUS ONCE AS NEEDED
Status: DISCONTINUED | OUTPATIENT
Start: 2018-01-26 | End: 2018-01-26 | Stop reason: HOSPADM

## 2018-01-26 RX ORDER — PROPOFOL 10 MG/ML
INJECTION, EMULSION INTRAVENOUS AS NEEDED
Status: DISCONTINUED | OUTPATIENT
Start: 2018-01-26 | End: 2018-01-26 | Stop reason: SURG

## 2018-01-26 RX ORDER — FENTANYL CITRATE 50 UG/ML
INJECTION, SOLUTION INTRAMUSCULAR; INTRAVENOUS AS NEEDED
Status: DISCONTINUED | OUTPATIENT
Start: 2018-01-26 | End: 2018-01-26 | Stop reason: SURG

## 2018-01-26 RX ORDER — HEPARIN SODIUM 1000 [USP'U]/ML
INJECTION, SOLUTION INTRAVENOUS; SUBCUTANEOUS AS NEEDED
Status: DISCONTINUED | OUTPATIENT
Start: 2018-01-26 | End: 2018-01-26 | Stop reason: SURG

## 2018-01-26 RX ORDER — LIDOCAINE HYDROCHLORIDE 10 MG/ML
INJECTION, SOLUTION INFILTRATION; PERINEURAL AS NEEDED
Status: DISCONTINUED | OUTPATIENT
Start: 2018-01-26 | End: 2018-01-26 | Stop reason: SURG

## 2018-01-26 RX ORDER — ONDANSETRON 2 MG/ML
INJECTION INTRAMUSCULAR; INTRAVENOUS AS NEEDED
Status: DISCONTINUED | OUTPATIENT
Start: 2018-01-26 | End: 2018-01-26 | Stop reason: SURG

## 2018-01-26 RX ORDER — SODIUM CHLORIDE, SODIUM LACTATE, POTASSIUM CHLORIDE, CALCIUM CHLORIDE 600; 310; 30; 20 MG/100ML; MG/100ML; MG/100ML; MG/100ML
20 INJECTION, SOLUTION INTRAVENOUS CONTINUOUS
Status: DISCONTINUED | OUTPATIENT
Start: 2018-01-26 | End: 2018-01-26 | Stop reason: HOSPADM

## 2018-01-26 RX ORDER — SODIUM CHLORIDE 9 MG/ML
INJECTION, SOLUTION INTRAVENOUS CONTINUOUS PRN
Status: DISCONTINUED | OUTPATIENT
Start: 2018-01-26 | End: 2018-01-26 | Stop reason: SURG

## 2018-01-26 RX ORDER — EPHEDRINE SULFATE 50 MG/ML
INJECTION, SOLUTION INTRAVENOUS AS NEEDED
Status: DISCONTINUED | OUTPATIENT
Start: 2018-01-26 | End: 2018-01-26 | Stop reason: SURG

## 2018-01-26 RX ORDER — LIDOCAINE HYDROCHLORIDE 10 MG/ML
INJECTION, SOLUTION INFILTRATION; PERINEURAL AS NEEDED
Status: DISCONTINUED | OUTPATIENT
Start: 2018-01-26 | End: 2018-01-26 | Stop reason: HOSPADM

## 2018-01-26 RX ORDER — SODIUM CHLORIDE, SODIUM LACTATE, POTASSIUM CHLORIDE, CALCIUM CHLORIDE 600; 310; 30; 20 MG/100ML; MG/100ML; MG/100ML; MG/100ML
INJECTION, SOLUTION INTRAVENOUS CONTINUOUS PRN
Status: DISCONTINUED | OUTPATIENT
Start: 2018-01-26 | End: 2018-01-26 | Stop reason: SURG

## 2018-01-26 RX ADMIN — FENTANYL CITRATE 50 MCG: 50 INJECTION, SOLUTION INTRAMUSCULAR; INTRAVENOUS at 08:44

## 2018-01-26 RX ADMIN — SODIUM CHLORIDE, SODIUM LACTATE, POTASSIUM CHLORIDE, AND CALCIUM CHLORIDE: .6; .31; .03; .02 INJECTION, SOLUTION INTRAVENOUS at 08:15

## 2018-01-26 RX ADMIN — EPHEDRINE SULFATE 5 MG: 50 INJECTION, SOLUTION INTRAMUSCULAR; INTRAVENOUS; SUBCUTANEOUS at 09:06

## 2018-01-26 RX ADMIN — PROPOFOL 150 MG: 10 INJECTION, EMULSION INTRAVENOUS at 08:44

## 2018-01-26 RX ADMIN — PROTAMINE SULFATE 25 MG: 10 INJECTION, SOLUTION INTRAVENOUS at 10:01

## 2018-01-26 RX ADMIN — PROPOFOL 50 MG: 10 INJECTION, EMULSION INTRAVENOUS at 09:24

## 2018-01-26 RX ADMIN — PROPOFOL 100 MG: 10 INJECTION, EMULSION INTRAVENOUS at 08:54

## 2018-01-26 RX ADMIN — SODIUM CHLORIDE: 0.9 INJECTION, SOLUTION INTRAVENOUS at 08:48

## 2018-01-26 RX ADMIN — CEFAZOLIN SODIUM 1000 MG: 1 SOLUTION INTRAVENOUS at 09:00

## 2018-01-26 RX ADMIN — DEXAMETHASONE SODIUM PHOSPHATE 10 MG: 10 INJECTION INTRAMUSCULAR; INTRAVENOUS at 08:53

## 2018-01-26 RX ADMIN — EPHEDRINE SULFATE 5 MG: 50 INJECTION, SOLUTION INTRAMUSCULAR; INTRAVENOUS; SUBCUTANEOUS at 09:11

## 2018-01-26 RX ADMIN — FENTANYL CITRATE 50 MCG: 50 INJECTION, SOLUTION INTRAMUSCULAR; INTRAVENOUS at 08:54

## 2018-01-26 RX ADMIN — HEPARIN SODIUM 5000 UNITS: 1000 INJECTION INTRAVENOUS; SUBCUTANEOUS at 09:26

## 2018-01-26 RX ADMIN — PROPOFOL 50 MG: 10 INJECTION, EMULSION INTRAVENOUS at 09:23

## 2018-01-26 RX ADMIN — LIDOCAINE HYDROCHLORIDE 50 MG: 10 INJECTION, SOLUTION INFILTRATION; PERINEURAL at 08:44

## 2018-01-26 RX ADMIN — ONDANSETRON 4 MG: 2 INJECTION INTRAMUSCULAR; INTRAVENOUS at 10:02

## 2018-01-26 RX ADMIN — EPHEDRINE SULFATE 5 MG: 50 INJECTION, SOLUTION INTRAMUSCULAR; INTRAVENOUS; SUBCUTANEOUS at 09:17

## 2018-01-26 RX ADMIN — ROCURONIUM BROMIDE 50 MG: 10 INJECTION INTRAVENOUS at 08:44

## 2018-01-26 RX ADMIN — GLYCOPYRROLATE 0.2 MG: 0.2 INJECTION, SOLUTION INTRAMUSCULAR; INTRAVENOUS at 09:20

## 2018-01-26 NOTE — OP NOTE
OPERATIVE REPORT  PATIENT NAME: Audrey Ann    :  1945  MRN: [id number]  Pt Location: [facility] OR ROOM 02    SURGERY DATE: 2018    Surgeon(s) and Role:     * Inez Carney MD - Primary    Preop Diagnosis:  Right iliac artery stenosis (HCC) [I77 1]    Post-Op Diagnosis Codes:     * Right iliac artery stenosis (HCC) [I77 1]    Procedure(s) (LRB):  US guided access of right and left common femoral arteries  Aortogram  Bilateral iliac artery angioplasty (kissing technique - 37g11yr )    Specimen(s):  * No specimens in log *    Estimated Blood Loss:   Minimal    Drains:   none    Anesthesia Type:   General    Operative Indications:  Right iliac artery stenosis ([address] ) [I77 1]  S/p EVAR in 2016 with compression of right iliac limb  Asymptomatic, however there were persistently elevated velocities on the duplex scans  Hence plan made to intervene with angioplasty and possible stent to prevent future limb thrombosis  Operative Findings:  See angiogram findings    Complications:   None    Procedure and Technique:  Pt was brought to OR and general anesthetic was induced via ET tube  Ancef was given IV  Both groins were shaved, prepped and draped in standard sterile fashion using chlorhexidine and Ioban  Using ultrasound guidance we punctured bilateral common femoral arteries  Both arteries were patent with minimal calcification  Micropuncture access system was used  On the right side due to significant scarring we had to use a stiff micropuncture for access  Next a 5 Sami sheath was placed bilaterally over a Bentson wire  On the right side due to scarring we had to pass a 6 Western Alissa dilator prior to inserting a 5 Western Alissa sheath  An Omniflush catheter was advanced into the suprarenal aorta and an aortogram with bilateral pelvic runoff was performed   This demonstrated an area of a kink/narrowing at the right iliac limb of the previously placed EVAR due to a narrow distal aorta consistent with the CTA and duplex findings  We then upsized the sheaths to 7 Western Alissa or need both sides  Patient was given 5000 units of IV heparin  We then performed kissing balloon angioplasty using 12 mm x 40 mm balloons at nominal pressure for 2 minutes prolonged inflation  There was clearly a waist on the right side that resolved with balloon angioplasty  Following which we performed repeat aortogram which demonstrated resolution of the stenosis  25 mg Protamine was given to reverse the effect of heparin  The sheaths were removed and access was closed with Mynx on both sides  Histacryl was applied over the puncture sites  Patient was then transferred to recovery room in stable fashion  He had bilateral 2+ posterior tibial and femoral pulses palpable at the end of the case           I was present for the entire procedure and A qualified resident physician was not available    Patient Disposition:  PACU     SIGNATURE: Sylvia Lozano MD  DATE: January 26, 2018  TIME: 10:28 AM

## 2018-01-26 NOTE — ANESTHESIA PROCEDURE NOTES
Arterial Line Insertion  Date/Time: 1/26/2018 8:50 AM  Performed by: Fabian Adams by: Marcus Gimenez   Consent: Written consent obtained  Risks and benefits: risks, benefits and alternatives were discussed  Consent given by: patient and spouse  Patient understanding: patient states understanding of the procedure being performed  Patient consent: the patient's understanding of the procedure matches consent given  Procedure consent: procedure consent matches procedure scheduled  Relevant documents: relevant documents present and verified  Test results: test results available and properly labeled  Site marked: the operative site was marked  Imaging studies: imaging studies available  Required items: required blood products, implants, devices, and special equipment available  Patient identity confirmed: verbally with patient, arm band, provided demographic data and hospital-assigned identification number  Time out: Immediately prior to procedure a "time out" was called to verify the correct patient, procedure, equipment, support staff and site/side marked as required  Preparation: Patient was prepped and draped in the usual sterile fashion  Indications: hemodynamic monitoring  Orientation:  LeftLocation: radial artery    Sedation:  Patient sedated: GETA    Rivera's test normal: yes  Needle gauge: 20  Seldinger technique: Seldinger technique used  Number of attempts: 1  Post-procedure: dressing applied  Post-procedure CNS: normal  Patient tolerance: Patient tolerated the procedure well with no immediate complications

## 2018-01-26 NOTE — ANESTHESIA PREPROCEDURE EVALUATION
Review of Systems/Medical History  Patient summary reviewed  Chart reviewed  No history of anesthetic complications     Cardiovascular  EKG reviewed, Exercise tolerance: good,  Hyperlipidemia, Hypertension , Valve replacement , History of CABG,    Pulmonary  Smoker cigarette smoker  , Tobacco cessation counseling given , No COPD , No asthma: , No shortness of breath,        GI/Hepatic    GERD ,             Endo/Other  Diabetes ,      GYN       Hematology   Musculoskeletal       Neurology   Psychology       TTE 2017: NML BIV FUNCTION; NML PROSTHETIC VALVE FUNCTION    Lab Results   Component Value Date    WBC 6 53 01/22/2018    HGB 17 1 (H) 01/22/2018     (L) 01/22/2018     Lab Results   Component Value Date     01/22/2018    K 4 5 01/22/2018    BUN 20 01/22/2018    CREATININE 0 78 01/22/2018    GLUCOSE 95 01/22/2018     Lab Results   Component Value Date    PTT 30 11/09/2015      Lab Results   Component Value Date    INR 1 00 01/22/2018       Blood type A    Lab Results   Component Value Date    HGBA1C 6 1 10/30/2017       Physical Exam    Airway    Mallampati score: II  TM Distance: >3 FB       Dental   No notable dental hx     Cardiovascular      Pulmonary      Other Findings        Anesthesia Plan  ASA Score- 3     Anesthesia Type- general with ASA Monitors  Additional Monitors:   Airway Plan: ETT  Comment:  JOSE Huffman , have personally seen and evaluated the patient prior to anesthetic care  I have reviewed the pre-anesthetic record, and other medical records if appropriate to the anesthetic care  If a CRNA is involved in the case, I have reviewed the CRNA assessment, if present, and agree  Risks/benefits and alternatives discussed with patient including possible PONV, sore throat, and possibility of rare anesthetic and surgical emergencies        Plan Factors- Patient instructed to abstain from smoking on day of procedure   Patient did not smoke on day of surgery  Induction- intravenous  Postoperative Plan- Plan for postoperative opioid use  Planned trial extubation    Informed Consent- Anesthetic plan and risks discussed with patient  I personally reviewed this patient with the CRNA  Discussed and agreed on the Anesthesia Plan with the CRNA  Alexia Ortez

## 2018-01-26 NOTE — H&P (VIEW-ONLY)
Assessment   1  Cigarette nicotine dependence with nicotine-induced disorder (292 9) (F17 219)   2  Abdominal aortic aneurysm (AAA) >39 mm diameter (441 4) (I71 4)   3  Right iliac artery stenosis (447 1) (I77 1)    Plan   Cigarette nicotine dependence with nicotine-induced disorder    · You need to quit smoking ; Status:Complete;   Done: 82HHC4246   Ordered; For:Cigarette nicotine dependence with nicotine-induced disorder; Ordered By:Rubina Zhao; History of repair of aneurysm of abdominal aorta using endovascular stent graft    · (1) BASIC METABOLIC PROFILE; Status:Active; Requested UEO:[id number];    Perform:[facility]; MFJ:[id number]; Last Updated By:Nunu Albert; 2018 10:58:35 AM;Ordered;For:History of repair of aneurysm of abdominal aorta using endovascular stent graft; Ordered By:Rubina Zhao;  Right iliac artery stenosis    · (1) BASIC METABOLIC PROFILE; Status:Active; Requested [id number]:[id number]; Perform:[facility]; YYB:[id number]; Last Updated By:Nunu Albert; 2018 10:58:36 AM;Ordered; For:Right iliac artery stenosis; Ordered By:Rubina Zhao;   · (1) CBC/ PLT (NO DIFF); Status:Active; Requested [id number]:[id number]; Perform:[facility]; GPD:[id number]; Last Updated By:Nunu Albert; 2018 10:58:36 AM;Ordered; For:Right iliac artery stenosis; Ordered By:Rubina Zhao;   · (1) PT WITH INR; Status:Active; Requested [id number]; Perform:[facility]; RTA:[id number]; Last Updated By:Nunu Albert; 2018 10:58:36 AM;Ordered; For:Right iliac artery stenosis; Ordered By:Rubina Zhao;   · ECG 12-LEAD; Status:Active; Requested [id number]:[id number]; Perform:[facility]; QKW:[id number]; Last Updated By:Nunu Albert; 2018 10:58:36 AM;Ordered; For:Right iliac artery stenosis; Ordered By:Rudy Zhao;   · Schedule Surgery Treatment  Procedure  Status: Hold For - Scheduling  Requested for:    [id number]   Ordered; For: Right iliac artery stenosis; Ordered By: Jona Otero Performed:  Due: 91VPX0337; Last Updated By: Melida Escamilla; 1/8/2018 11:19:08 AM    Discussion/Summary   Discussion Summary:    Status post endovascular aortic aneurysm repair on in January 2017  Zenith 16mm limb was placed in Both the right and left iliac arteries  He has had narrowing of the right limb at the distal aorta due to narrow diameter of the distal aorta  Over time the stenosis remains persistent  I had his case discussed in our multidisciplinary case conference and the consensus opinion was to treat this with additional bare-metal stenting at the aortic bifurcation to prevent any future complications of limb thrombosis  I explained the rationale of this procedure to the patient and he agrees  Plan to perform bilateral iliac angiograms with iliac angioplasty and stent  Risks of the procedure were discussed with the patient  Chief Complaint   Chief Complaint Free Text Note Form: I'm here to review my test results          History of Present Illness   HPI: Pt is here today to review EVAR Duplex done 10/30/17  Pt offers no complaints  Pt is taking aspirin daily  underwent endovascular aortic aneurysm repair in January 2017  He denies any claudication symptoms  He denies any rest pain in his legs  He denies any wounds in his legs  Recently has started smoking again about 10 cigarettes a day  He had quit for 6 years before  Review of Systems   Complete Male - Vasc:      Constitutional: No fever or chills, feels well, no tiredness, no recent weight gain or weight loss  Eyes: No sudden vision loss, no blurred vision, no double vision  ENT: no loss of hearing, no nosebleeds, no hoarseness  Cardiovascular: no painful veins,-- no leg pain with walking-- and-- no bleeding veins, but-- regular heart rate,-- no chest pain,-- no intermittent leg claudication-- and-- no palpitations        Respiratory: No sob, no wheezing, no cough, no sob with exertion, no orthopnea  Gastrointestinal: No nausea, No vomiting, no diarrhea, no blood in stool  Genitourinary: no dysuria, no hematuria, No urinary incontinence, no erectile dysfunction  Musculoskeletal: no limb pain, no limb swelling  Integumentary: no rash, no lesions, no wounds, no ulcer  Neurological: no dementia, no headache, no numbness, no limb weakness, no dizziness, no difficulty walking  Psychiatric: no depression, no mood disorders, no anxiety  Hematologic/Lymphatic: no bleeding disorder, no easy bruising  ROS Reviewed:    ROS reviewed  Active Problems   1  Abdominal aortic aneurysm (AAA) >39 mm diameter (441 4) (I71 4)   2  Aortic stenosis, severe (424 1) (I35 0)   3  Arteriosclerotic cardiovascular disease (429 2,440 9) (I25 10)   4  Cigarette nicotine dependence with nicotine-induced disorder (292 9) (F17 219)   5  Diabetes mellitus (250 00) (E11 9)   6  History of repair of aneurysm of abdominal aorta using endovascular stent graft (V43 4)     (Z95 828)   7  Hypercholesterolemia (272 0) (E78 00)   8  Hyperlipidemia (272 4) (E78 5)   9  Hypertension (401 9) (I10)   10  Other symptoms involving cardiovascular system (785 9) (R09 89)   11  Pre-op testing (V72 84) (Z01 818)   12  S/P AVR (V43 3) (Z95 2)    Past Medical History   1  History of Diverticulitis of large intestine with perforation with bleeding (669 99,765 83)     (K57 21)   2  History of Hearing deficit (V41 2) (H91 90)   3  History of abdominal aortic aneurysm (V12 59) (Z86 79)   4  History of type 2 diabetes mellitus (V12 29) (Z86 39)   5  History of Pneumonia (486)  Active Problems And Past Medical History Reviewed: The active problems and past medical history were reviewed and updated today  Surgical History   1  History of Abdominal Surgery   2  History of Aortic Valve Replacement   3  History of CABG   4  History of Colostomy Temporary   5   History of Endovascular Repair Infrarenal Abdominal Aorta Aneurysm   6  History of Tonsillectomy  Surgical History Reviewed: The surgical history was reviewed and updated today  Family History   Brother    1  Family history of cerebral infarction (V17 1) (Z82 3)  Family History    2  Family history of cerebral infarction (V17 1) (Z82 3)   3  Family history of hyperlipidemia (V18 19) (Z83 49)   4  Family history of malignant neoplasm (V16 9) (Z80 9)  Family History Reviewed: The family history was reviewed and updated today  Social History    · Alcohol use (V49 89) (Z78 9)   · Current every day smoker (305 1) (F17 200)   · Current occasional smoker (305 1) (Z72 0)   · Denied: Drug use (305 90) (F19 90)  Social History Reviewed: The social history was reviewed and updated today  Current Meds    1  Aspirin 81 MG TABS; Therapy: (Recorded:18May2016) to Recorded   2  BD Pen Needle Susan U/F 32G X 4 MM Miscellaneous; use 4 needles daily for insulin     injections MDD:4 TDD:4;     Therapy: 62HKR3853 to (Evaluate:11Mar2016)  Requested for: 88JMJ0218; Last     Rx:12Nov2015 Ordered   3  Citalopram Hydrobromide 40 MG Oral Tablet; TAKE 1 TABLET DAILY; Therapy: (Recorded:06Koa4646) to Recorded   4  HydroCHLOROthiazide 25 MG Oral Tablet; TAKE 1 TABLET DAILY; Therapy: (Recorded:13Jan2016) to Recorded   5  Invokana 100 MG Oral Tablet; TAKE TABLET Daily; Therapy: (Recorded:13Jan2016) to Recorded   6  Lisinopril 20 MG Oral Tablet; Therapy: (Recorded:18May2016) to Recorded   7  Oxybutynin Chloride 5 MG Oral Tablet; TAKE TABLET Twice daily; Therapy: (Recorded:13Jan2016) to Recorded   8  Simvastatin 40 MG Oral Tablet; Therapy: (Recorded:18May2016) to Recorded  Medication List Reviewed: The medication list was reviewed and updated today  Allergies   1   No Known Drug Allergies    Vitals   Vital Signs    Recorded: 03WEL1929 09:27AM   Temperature 96 9 F, Tympanic   Heart Rate 68, R Radial   Pulse Quality Normal, R Radial Respiration Quality Normal   Respiration 16   Systolic 263, LUE, Sitting   Diastolic 64, LUE, Sitting   Height 5 ft 9 in   Weight 164 lb    BMI Calculated 24 22   BSA Calculated 1 9     Physical Exam        Posterior tibialis: right 2+  Dorsalis pedis: left 2+  Distal Pulse Exam: Normal Capillary Refill  Extremities: No upper or lower extremity edema  LE Varicose Veins: No Varicose Veins are Present  The heart rate was normal  The rhythm was regular  Heart sounds: normal S1-- and-- normal S2       Murmurs: No murmurs were heard  Pulmonary      Respiratory effort: No increased work of breathing or signs of respiratory distress  Auscultation of lungs: Clear to auscultation  No wheezing, no rales, no rhonchi  Abdomen      Abdomen: Abdomen soft, non-tender, no masses, non distended, no rebound tenderness  No palpable aneurysm  Psychiatric      Orientation to person, place and time: Normal       Mood and affect: Normal       Eyes      Conjunctiva and lids: No swelling, erythema, or discharge  Ears, Nose, Mouth, and Throat      Hearing: Normal       Musculoskeletal      Gait and station: Normal       Skin      Skin and subcutaneous tissue: Normal without rashes or lesions  Venous Disease: No lipodermatosclerosis, stasis dermatitis, hyperpigmentation, or atrophie shanda noted on exam       Results/Data   Diagnostic Studies Reviewed Vasc: I personally reviewed the films/images/results in the office today  My interpretation follows  Vascular Study Review persistently elevated velocities in right iliac limb  CT Scan Review compression of right iliac limb resulting in stenosis  Surgery Scheduling Form   Vascular Surgery Scheduling Form [facility] Standard:         Location: [facility],-- OR Type: Hybrid Room Needed    Confirmation Number:    Procedure Date:    Requested Time:      Physician shabbir      Co-Surgeon:    [facility] Required:      Bed: Outpatient- No Bed Required  Anesthesia: IV Sedation w/Anesthesia,-- Choice  PROCEDURE DETAILS      Procedure: angiogram, bilateral iliac artery stenting COOK rep  Laterality:    Anticipated frozen section:    Procedure Codes:    Pre-op diagnosis:    Diagnosis Code(s):    Case Length:      Equipment: Discovery Table Requested  Equipment Needs:    Implants/Representative:       REGISTRATION & FINANCIAL CLEARANCE      Amount Paid/Date:    FA Initials:    Insurance:    Policy Number: Group Number:       PRE-ADMISSION TESTING & CLINICAL INFORMATION    PAT Location:       Consults Needed    Anesthesia Consult:    Medical Consult:    Cardiac Consult:           ALLERGIES AND ALERTS    Latex Allergy:    Penicillin Allergy:    Malignant Hyperthermia:    Diabetic Patient:       COMMENTS    Scheduling Information Provided By:       IN OFFICE USE      Urgency: Standard (nonurgent)    Additional Bed Requirements:    CD to Hospital    Is the patient able to walk up a flight of stairs, walk up a hill or do heavy housework WITHOUT having chest pain or shortness of breath? Patient is currently taking Aspirin    does not need to hold Aspirin prior to procedure/surgery  Signatures    Electronically signed by :  Lonny Muñoz MD; Jan 8 2018  3:44PM EST                       (Author)

## 2018-01-26 NOTE — ANESTHESIA POSTPROCEDURE EVALUATION
Post-Op Assessment Note      CV Status:  Stable    Mental Status:  Awake    Hydration Status:  Stable    PONV Controlled:  None    Airway Patency:  Patent    Post Op Vitals Reviewed: Yes          Staff: AnesthesiologistMARIBEL           /76 (01/26/18 1031)    Temp 98 9 °F (37 2 °C) (01/26/18 1031)    Pulse 82 (01/26/18 1031)   Resp 18 (01/26/18 1031)    SpO2 95 % (01/26/18 1031)

## 2018-01-26 NOTE — PROGRESS NOTES
Assumed care of patient at this time  Report received from Gisele Hanks in [address]  Patient in supine position with B/L lower extremities straight  In NAD  Pt's wife Zahira Shaw at bedside

## 2018-02-05 ENCOUNTER — TRANSCRIBE ORDERS (OUTPATIENT)
Dept: ADMINISTRATIVE | Facility: HOSPITAL | Age: 73
End: 2018-02-05

## 2018-02-05 ENCOUNTER — TRANSCRIBE ORDERS (OUTPATIENT)
Dept: VASCULAR SURGERY | Facility: CLINIC | Age: 73
End: 2018-02-05

## 2018-02-05 DIAGNOSIS — R09.89 BRUIT: Primary | ICD-10-CM

## 2018-02-05 DIAGNOSIS — I71.4 ABDOMINAL AORTIC ANEURYSM WITHOUT RUPTURE (HCC): Primary | ICD-10-CM

## 2018-02-07 ENCOUNTER — OFFICE VISIT (OUTPATIENT)
Dept: VASCULAR SURGERY | Facility: CLINIC | Age: 73
End: 2018-02-07
Payer: MEDICARE

## 2018-02-07 VITALS
DIASTOLIC BLOOD PRESSURE: 60 MMHG | RESPIRATION RATE: 16 BRPM | BODY MASS INDEX: 23.99 KG/M2 | SYSTOLIC BLOOD PRESSURE: 132 MMHG | HEART RATE: 78 BPM | WEIGHT: 162 LBS | HEIGHT: 69 IN

## 2018-02-07 DIAGNOSIS — I77.1 RIGHT ILIAC ARTERY STENOSIS (HCC): ICD-10-CM

## 2018-02-07 DIAGNOSIS — I71.4 ABDOMINAL AORTIC ANEURYSM (AAA) WITHOUT RUPTURE (HCC): Primary | ICD-10-CM

## 2018-02-07 PROCEDURE — 99213 OFFICE O/P EST LOW 20 MIN: CPT | Performed by: SURGERY

## 2018-02-07 NOTE — PROGRESS NOTES
Assessment/Plan:    Right iliac artery stenosis [facility])  Status post endovascular aortic aneurysm repair  Postoperative CT scan and duplex revealed stenosis of the right iliac limb of the endovascular aortic aneurysm stent  It was being followed with serial Dopplers but there was a persistent elevated velocities and patient recently underwent angiogram of bilateral iliac arteries with kissing balloon angioplasty and treatment of the stenosis of the right iliac limb with excellent results  I will continue to monitor this with serial Dopplers and a CT angiogram   If there is any recurrence he will need placement of balloon expandable stents in that area  Smoking cessation advised  He is also scheduled to undergo a carotid Doppler tomorrow as ordered by his family physician Dr Nellie Thompson  Diagnoses and all orders for this visit:    Abdominal aortic aneurysm (AAA) without rupture (HCC)  -     VAS evar endovascular aortic repair duplex; Future    Right iliac artery stenosis (HCC)  -     VAS evar endovascular aortic repair duplex; Future      Imaging review-angiogram demonstrates successful angioplasty of bilateral iliac arteries in a kissing technique with appropriate resolution of the stenosis of the right iliac limb stenosis  Subjective:      Patient ID: Hardeep Prieto is a 71 y o  male  Patient recently underwent bilateral iliac artery angioplasty for stenosis of right iliac limb of his aortic endograft  He had been asymptomatic from this but it was picked up on serial Dopplers and CT scan and was felt that is important to treated to prevent any limb thrombosis in the future  Postprocedure Re denies any pain  He denies any pain in the groin or pain during walking  He is trying to quit smoking          The following portions of the patient's history were reviewed and updated as appropriate: allergies, current medications, past family history, past medical history, past social history, past surgical history and problem list         Review of Systems   Constitutional: Negative  HENT: Negative  Eyes: Negative  Respiratory: Negative  Cardiovascular: Negative  Gastrointestinal: Negative  Endocrine: Negative  Genitourinary: Negative  Musculoskeletal: Negative  Skin: Negative  Allergic/Immunologic: Negative  Neurological: Negative  Hematological: Negative  Psychiatric/Behavioral: Negative  Objective:     Physical Exam   Constitutional: He is oriented to person, place, and time  He appears well-developed and well-nourished  HENT:   Head: Normocephalic  Cardiovascular: Normal rate  Pulses:       Femoral pulses are 2+ on the right side, and 2+ on the left side  Posterior tibial pulses are 2+ on the right side, and 2+ on the left side  Bilateral groin access site is soft and no ecchymosis  Abdominal: Soft  Neurological: He is alert and oriented to person, place, and time  Psychiatric: He has a normal mood and affect   His behavior is normal

## 2018-02-07 NOTE — ASSESSMENT & PLAN NOTE
Status post endovascular aortic aneurysm repair  Postoperative CT scan and duplex revealed stenosis of the right iliac limb of the endovascular aortic aneurysm stent  It was being followed with serial Dopplers but there was a persistent elevated velocities and patient recently underwent angiogram of bilateral iliac arteries with kissing balloon angioplasty and treatment of the stenosis of the right iliac limb with excellent results  I will continue to monitor this with serial Dopplers and a CT angiogram   If there is any recurrence he will need placement of balloon expandable stents in that area  Smoking cessation advised

## 2018-02-08 ENCOUNTER — HOSPITAL ENCOUNTER (OUTPATIENT)
Dept: NON INVASIVE DIAGNOSTICS | Facility: CLINIC | Age: 73
Discharge: HOME | End: 2018-02-08
Payer: MEDICARE

## 2018-02-08 DIAGNOSIS — R09.89 BRUIT: ICD-10-CM

## 2018-02-08 PROCEDURE — 93880 EXTRACRANIAL BILAT STUDY: CPT

## 2018-02-08 PROCEDURE — 93880 EXTRACRANIAL BILAT STUDY: CPT | Performed by: SURGERY

## 2018-02-09 ENCOUNTER — TRANSCRIBE ORDERS (OUTPATIENT)
Dept: VASCULAR SURGERY | Facility: CLINIC | Age: 73
End: 2018-02-09

## 2018-02-09 DIAGNOSIS — I71.4 ABDOMINAL AORTIC ANEURYSM WITHOUT RUPTURE (HCC): Primary | ICD-10-CM

## 2018-03-07 NOTE — PROGRESS NOTES
Dear Damon Arroyo,   My name is Jose Roberto and I am a Registered  Nurse and Care Coordinator for [address]  We recently spoke on the phone regarding your hospital stay and you opted out of continuing to receive follow-up phone calls from me  Because of the reason that you were  in the hospital, Medicare has placed you in a program called [address]  One of the benefits of the program is that you can have a nurse available to answer any questions or concerns you might have  Please feel free to call  me at the number listed below    Sincerely,      [address]  [phone]          Electronically signed by:Chantal Cooley RN  Jan 12 2017 11:03AM EST

## 2018-05-07 ENCOUNTER — HOSPITAL ENCOUNTER (OUTPATIENT)
Dept: NON INVASIVE DIAGNOSTICS | Facility: CLINIC | Age: 73
Discharge: HOME | End: 2018-05-07
Payer: MEDICARE

## 2018-05-07 DIAGNOSIS — I77.1 RIGHT ILIAC ARTERY STENOSIS (HCC): ICD-10-CM

## 2018-05-07 DIAGNOSIS — I71.4 ABDOMINAL AORTIC ANEURYSM (AAA) WITHOUT RUPTURE (HCC): ICD-10-CM

## 2018-05-07 PROCEDURE — 93978 VASCULAR STUDY: CPT

## 2018-05-09 PROCEDURE — 93922 UPR/L XTREMITY ART 2 LEVELS: CPT | Performed by: SURGERY

## 2018-05-09 PROCEDURE — 93978 VASCULAR STUDY: CPT | Performed by: SURGERY

## 2018-06-04 ENCOUNTER — HOSPITAL ENCOUNTER (OUTPATIENT)
Dept: NON INVASIVE DIAGNOSTICS | Facility: CLINIC | Age: 73
Discharge: HOME | End: 2018-06-04
Payer: MEDICARE

## 2018-06-04 DIAGNOSIS — Z95.2 PRESENCE OF PROSTHETIC HEART VALVE: ICD-10-CM

## 2018-06-04 DIAGNOSIS — I25.10 ATHEROSCLEROTIC HEART DISEASE OF NATIVE CORONARY ARTERY WITHOUT ANGINA PECTORIS: ICD-10-CM

## 2018-06-04 DIAGNOSIS — E78.5 HYPERLIPIDEMIA: ICD-10-CM

## 2018-06-04 DIAGNOSIS — I10 ESSENTIAL (PRIMARY) HYPERTENSION: ICD-10-CM

## 2018-06-04 PROCEDURE — 93306 TTE W/DOPPLER COMPLETE: CPT | Performed by: INTERNAL MEDICINE

## 2018-06-04 PROCEDURE — 93306 TTE W/DOPPLER COMPLETE: CPT

## 2018-06-24 DIAGNOSIS — Z95.2 PRESENCE OF PROSTHETIC HEART VALVE: ICD-10-CM

## 2018-06-24 DIAGNOSIS — I10 ESSENTIAL (PRIMARY) HYPERTENSION: ICD-10-CM

## 2018-06-24 DIAGNOSIS — I25.10 ATHEROSCLEROTIC HEART DISEASE OF NATIVE CORONARY ARTERY WITHOUT ANGINA PECTORIS: ICD-10-CM

## 2018-06-24 DIAGNOSIS — E78.5 HYPERLIPIDEMIA: ICD-10-CM

## 2018-07-30 ENCOUNTER — OFFICE VISIT (OUTPATIENT)
Dept: CARDIOLOGY CLINIC | Facility: CLINIC | Age: 73
End: 2018-07-30
Payer: MEDICARE

## 2018-07-30 VITALS
HEART RATE: 76 BPM | SYSTOLIC BLOOD PRESSURE: 124 MMHG | HEIGHT: 69 IN | WEIGHT: 167.1 LBS | DIASTOLIC BLOOD PRESSURE: 62 MMHG | BODY MASS INDEX: 24.75 KG/M2

## 2018-07-30 DIAGNOSIS — E78.00 PURE HYPERCHOLESTEROLEMIA: ICD-10-CM

## 2018-07-30 DIAGNOSIS — I25.10 CORONARY ARTERIOSCLEROSIS: ICD-10-CM

## 2018-07-30 DIAGNOSIS — Z95.2 S/P AVR: ICD-10-CM

## 2018-07-30 DIAGNOSIS — I10 ESSENTIAL (PRIMARY) HYPERTENSION: Primary | ICD-10-CM

## 2018-07-30 PROCEDURE — 99214 OFFICE O/P EST MOD 30 MIN: CPT | Performed by: INTERNAL MEDICINE

## 2018-07-30 RX ORDER — SITAGLIPTIN AND METFORMIN HYDROCHLORIDE 1000; 50 MG/1; MG/1
1 TABLET, FILM COATED, EXTENDED RELEASE ORAL DAILY
COMMUNITY
Start: 2018-07-09

## 2018-07-30 RX ORDER — CITALOPRAM 10 MG/1
10 TABLET ORAL DAILY
COMMUNITY
Start: 2018-05-13

## 2018-07-30 RX ORDER — SOLIFENACIN SUCCINATE 10 MG/1
TABLET, FILM COATED ORAL
COMMUNITY
Start: 2018-07-25 | End: 2019-08-05 | Stop reason: SDUPTHER

## 2018-07-30 NOTE — PROGRESS NOTES
Cardiology Follow Up    Patrick Keith  1945  [id number]  [address] [facility] Carine Torres [address] [facility] [address] [facility]  [phone]    1  Essential (primary) hypertension     2  Pure hypercholesterolemia     3  Coronary arteriosclerosis     4  S/P AVR         Interval History:  Patient is here for a follow-up visit  He was last seen by me in July last year  Since that time he had an echocardiogram done in June of this year which demonstrated preserved LV systolic function with mild LVH  The bioprosthetic valve in the aortic position was functioning appropriately  November 2015 the patient had a bioprosthetic valve placed in the aortic position and a saphenous vein graft to the right coronary artery  Patient has a # 23 Magna Ease bovine pericardial valve in place  The surgery was done by Dr Oriana Bettencourt  He is also followed by vascular surgery in reference to prior EVAR of an abdominal aortic aneurysm  He had a study done in May of this year  He has been feeling well  He has had no chest pain or significant dyspnea  Patient Active Problem List   Diagnosis    [facility]     Past Medical History:   Diagnosis Date    [facility]     Diabetes mellitus ([facility] )     Diverticulosis     GERD (gastroesophageal reflux disease)     Hyperlipidemia     Hypertension     Sleep apnea     NOT DIAGNOSED     Social History     Social History    Marital status: /Civil Union     Spouse name: N/A    Number of children: N/A    Years of education: N/A     Occupational History    Not on file       Social History Main Topics    Smoking status: Current Every Day Smoker     Packs/day: 0 50     Years: 30 00    Smokeless tobacco: Never Used    Alcohol use Yes      Comment: SOCIAL    Drug use: No    Sexual activity: No     Other Topics Concern    Not on file     Social History Narrative    No narrative on file      No family history on file  Past Surgical History:   Procedure Laterality Date    AORTIC VALVE REPLACEMENT      APPENDECTOMY      CARDIAC SURGERY      CORONARY ARTERY BYPASS GRAFT      LAPAROSCOPIC COLON RESECTION      LEG SURGERY      [facility] REMOVED FROM LOWER EXTREMITIES    [place] AAA REPAIR,AORTO-AORTIC TUBE PROSTH N/A 1/6/2017    Procedure: REPAIR ANEURYSM ENDOVASCULAR ABDOMINAL AORTIC  (EVAR); Surgeon: Rolando Bahena MD;  Location: [facility];  Service: Vascular    [place] SLCTV CATHJ 3RD+ ORD SLCTV ABDL PEL/LXTR [facility] N/A 1/26/2018    Procedure: ANGIOGRAM; BILATERAL ILIAC ARTERY BALLOONING;  Surgeon: Rolando Bahena MD;  Location: [facility];  Service: Vascular    VALVE REPLACEMENT         Current Outpatient Prescriptions:     acetaminophen (TYLENOL) 325 mg tablet, Take 1 tablet every 6 hr as needed for pain , Disp: 30 tablet, Rfl: 0    aspirin 81 mg chewable tablet, Chew 81 mg daily  , Disp: , Rfl:     atorvastatin (LIPITOR) 40 mg tablet, Take 40 mg by mouth daily  , Disp: , Rfl:     canagliflozin (INVOKANA) 100 mg, Take 100 mg by mouth daily before breakfast Patient takes 1 ans 1/2 pills to total 150mg , Disp: , Rfl:     CITALOPRAM HYDROBROMIDE PO, Take 40 mg by mouth daily, Disp: , Rfl:     hydrochlorothiazide (HYDRODIURIL) 25 mg tablet, Take by mouth daily  , Disp: , Rfl:     lisinopril (ZESTRIL) 10 mg tablet, Lisinopril 10 MG Oral Tablet TAKE 1 TABLET DAILY AS DIRECTED  Quantity: 30;  Refills: 3    [facility];  Started 12-Nov-2015 Active, Disp: , Rfl:     oxybutynin (DITROPAN) 5 mg tablet, Take 5 mg by mouth 2 (two) times a day  , Disp: , Rfl:   No Known Allergies    Labs:not applicable  Imaging: No results found  Review of Systems:  Review of Systems   All other systems reviewed and are negative  Physical Exam:  Physical Exam   Constitutional: He is oriented to person, place, and time   He appears well-developed and well-nourished  HENT:   Head: Normocephalic and atraumatic  Eyes: Conjunctivae are normal  Pupils are equal, round, and reactive to light  Neck: Normal range of motion  Neck supple  Cardiovascular: Normal rate  Murmur heard  Pulmonary/Chest: Effort normal and breath sounds normal    Neurological: He is alert and oriented to person, place, and time  Skin: Skin is warm and dry  Psychiatric: He has a normal mood and affect  Vitals reviewed  Discussion/Summary:I will continue the patient's present medical regimen  The patient appears well compensated  I have asked the patient to call if there is a problem in the interim otherwise I will see the patient in one years time  Patient is due for an echocardiogram prior to the next visit to assess LV wall thickness and systolic function

## 2019-01-14 DIAGNOSIS — I71.4 ABDOMINAL AORTIC ANEURYSM WITHOUT RUPTURE (HCC): Primary | ICD-10-CM

## 2019-01-15 DIAGNOSIS — I71.4 ABDOMINAL AORTIC ANEURYSM WITHOUT RUPTURE (HCC): Primary | ICD-10-CM

## 2019-01-25 ENCOUNTER — HOSPITAL ENCOUNTER (OUTPATIENT)
Dept: CT IMAGING | Facility: HOSPITAL | Age: 74
Discharge: HOME | End: 2019-01-25
Attending: SURGERY
Payer: MEDICARE

## 2019-01-25 DIAGNOSIS — I71.4 ABDOMINAL AORTIC ANEURYSM WITHOUT RUPTURE (HCC): ICD-10-CM

## 2019-01-25 PROCEDURE — 74176 CT ABD & PELVIS W/O CONTRAST: CPT

## 2019-06-13 ENCOUNTER — OFFICE VISIT (OUTPATIENT)
Dept: UROLOGY | Facility: CLINIC | Age: 74
End: 2019-06-13
Payer: MEDICARE

## 2019-06-13 ENCOUNTER — TELEPHONE (OUTPATIENT)
Dept: UROLOGY | Facility: MEDICAL CENTER | Age: 74
End: 2019-06-13

## 2019-06-13 VITALS
WEIGHT: 164 LBS | HEART RATE: 70 BPM | DIASTOLIC BLOOD PRESSURE: 82 MMHG | SYSTOLIC BLOOD PRESSURE: 138 MMHG | HEIGHT: 69 IN | BODY MASS INDEX: 24.29 KG/M2

## 2019-06-13 DIAGNOSIS — R35.0 URINARY FREQUENCY: Primary | ICD-10-CM

## 2019-06-13 LAB — POST-VOID RESIDUAL VOLUME, ML POC: 0 ML

## 2019-06-13 PROCEDURE — 51798 US URINE CAPACITY MEASURE: CPT | Performed by: UROLOGY

## 2019-06-13 PROCEDURE — 99204 OFFICE O/P NEW MOD 45 MIN: CPT | Performed by: UROLOGY

## 2019-06-13 RX ORDER — SOLIFENACIN SUCCINATE 10 MG/1
5 TABLET, FILM COATED ORAL DAILY
Qty: 90 TABLET | Refills: 3 | Status: SHIPPED | OUTPATIENT
Start: 2019-06-13 | End: 2019-09-17 | Stop reason: ALTCHOICE

## 2019-06-17 ENCOUNTER — TELEPHONE (OUTPATIENT)
Dept: UROLOGY | Facility: CLINIC | Age: 74
End: 2019-06-17

## 2019-06-27 ENCOUNTER — TELEPHONE (OUTPATIENT)
Dept: GASTROENTEROLOGY | Facility: AMBULARY SURGERY CENTER | Age: 74
End: 2019-06-27

## 2019-06-27 NOTE — TELEPHONE ENCOUNTER
NEW PT    Pt and spouse on waiting list since May for Dr Derek Worrell appt   Please assist in scheduling

## 2019-07-01 DIAGNOSIS — I71.4 ABDOMINAL AORTIC ANEURYSM WITHOUT RUPTURE (HCC): Primary | ICD-10-CM

## 2019-07-12 ENCOUNTER — HOSPITAL ENCOUNTER (OUTPATIENT)
Dept: NON INVASIVE DIAGNOSTICS | Facility: CLINIC | Age: 74
Discharge: HOME | End: 2019-07-12
Payer: MEDICARE

## 2019-07-12 DIAGNOSIS — Z95.2 S/P AVR: ICD-10-CM

## 2019-07-12 DIAGNOSIS — I25.10 CORONARY ARTERIOSCLEROSIS: ICD-10-CM

## 2019-07-12 DIAGNOSIS — I10 ESSENTIAL (PRIMARY) HYPERTENSION: ICD-10-CM

## 2019-07-12 DIAGNOSIS — E78.00 PURE HYPERCHOLESTEROLEMIA: ICD-10-CM

## 2019-07-12 PROCEDURE — 93306 TTE W/DOPPLER COMPLETE: CPT | Performed by: INTERNAL MEDICINE

## 2019-07-12 PROCEDURE — 93306 TTE W/DOPPLER COMPLETE: CPT

## 2019-07-31 ENCOUNTER — HOSPITAL ENCOUNTER (OUTPATIENT)
Dept: NON INVASIVE DIAGNOSTICS | Facility: CLINIC | Age: 74
Discharge: HOME | End: 2019-07-31
Payer: MEDICARE

## 2019-07-31 DIAGNOSIS — I71.4 ABDOMINAL AORTIC ANEURYSM WITHOUT RUPTURE (HCC): ICD-10-CM

## 2019-07-31 PROCEDURE — 93978 VASCULAR STUDY: CPT

## 2019-07-31 PROCEDURE — 93978 VASCULAR STUDY: CPT | Performed by: SURGERY

## 2019-08-05 ENCOUNTER — OFFICE VISIT (OUTPATIENT)
Dept: VASCULAR SURGERY | Facility: CLINIC | Age: 74
End: 2019-08-05
Payer: MEDICARE

## 2019-08-05 VITALS
WEIGHT: 160 LBS | SYSTOLIC BLOOD PRESSURE: 164 MMHG | HEART RATE: 62 BPM | HEIGHT: 69 IN | TEMPERATURE: 98.7 F | BODY MASS INDEX: 23.7 KG/M2 | DIASTOLIC BLOOD PRESSURE: 72 MMHG

## 2019-08-05 DIAGNOSIS — I71.4 ABDOMINAL AORTIC ANEURYSM (AAA) WITHOUT RUPTURE (HCC): Primary | ICD-10-CM

## 2019-08-05 DIAGNOSIS — I10 HYPERTENSION, UNSPECIFIED TYPE: ICD-10-CM

## 2019-08-05 DIAGNOSIS — E78.5 HYPERLIPIDEMIA, UNSPECIFIED HYPERLIPIDEMIA TYPE: ICD-10-CM

## 2019-08-05 DIAGNOSIS — E11.59 TYPE 2 DIABETES MELLITUS WITH OTHER CIRCULATORY COMPLICATION, WITHOUT LONG-TERM CURRENT USE OF INSULIN (HCC): ICD-10-CM

## 2019-08-05 PROBLEM — E11.9 DIABETES MELLITUS (HCC): Status: ACTIVE | Noted: 2019-08-05

## 2019-08-05 PROCEDURE — 99214 OFFICE O/P EST MOD 30 MIN: CPT | Performed by: NURSE PRACTITIONER

## 2019-08-05 NOTE — PATIENT INSTRUCTIONS
Continue taking aspirin, statin daily  Continue with good blood pressure management per primary care provider  We will schedule you for a CT scan for February of 2020 with a follow-up appoint with Dr Elsy Guzmán  If you have any acute sudden onset of chest pain, abdominal pain, back pain that is sharp, shooting, radiating or ripping tearing type pain or you have decreased motor or sensation with pain associated to the lower extremities, please go to the emergency room or call 911 for further evaluation  If you have any questions, please call our office  Nonruptured Abdominal Aortic Aneurysm   AMBULATORY CARE:   What you need to know about an abdominal aortic aneurysm (AAA): The aorta is a large blood vessel that extends from your heart to your abdomen  The part of the aorta that extends into your abdomen is called your abdominal aorta  Your abdominal aorta brings blood to your stomach, pelvis, and legs  An AAA is a bulging or weak area in your abdominal aorta  Over time, the bulge may grow and is at risk for tearing or rupturing  An AAA that ruptures is a life-threatening emergency  Signs and symptoms: An AAA usually does not have signs or symptoms if it has not ruptured  If the AAA starts to leak or ruptures, you may have any of the following:  · Sudden pain in your abdomen, groin, back, legs, or buttocks    · Nausea and vomiting    · A lump or swelling in your abdomen    · Stiff abdominal muscles    · Numbness or tingling in your legs    · Pale, sweaty, or clammy skin    · Dizziness, fainting or loss of consciousness  Call 911 or have someone else call for any of the following:   · You faint or lose consciousness  · You cannot be woken  Seek care immediately if:  The following signs or symptoms may mean the AAA is at risk of rupturing:  · You have sudden sharp pain in your abdomen, groin, back, legs, or buttocks  · You have nausea and vomiting  · You feel dizzy       · You have stiffness or swelling in your abdomen, or a lump in your abdomen  · You have numbness or tingling in your legs  · Your skin is pale, sweaty, or clammy  Contact your healthcare provider if:   · You have questions or concerns about your condition or care  Treatment of an AAA  may not be needed  Your healthcare provider may monitor the size of your AAA with tests, such as an ultrasound  You may be given medicines to prevent the AAA from growing  Examples include blood pressure medicine and medicine to lower your cholesterol  If your AAA gets bigger, starts to leak, or ruptures, you may need any of the following:  · Endovascular repair  is a procedure that uses a graft to repair your AAA  The graft stops blood flow to the aneurysm and protects your abdominal aorta  You may need to have more than 1 endovascular repair  · Open repair  is surgery to repair or remove an AAA  Manage a nonruptured AAA:  You can help prevent your AAA from growing or rupturing by doing the following:  · Do not smoke  Nicotine and other chemicals in cigarettes and cigars can increase your blood pressure  It can also damage your aorta and increase the size of your AAA  Ask your healthcare provider for information if you currently smoke and need help to quit  E-cigarettes or smokeless tobacco still contain nicotine  Talk to your healthcare provider before you use these products  · Exercise as directed  Exercise can help control your blood pressure and cholesterol level  Ask your healthcare provider how much exercise you need each day and which exercises are best for you  · Follow the meal plan recommended by your healthcare provider  Talk to your dietitian about a heart-healthy or low-sodium eating plan  Meal plans will help you lower your cholesterol and blood pressure  They will also help you reach a healthy weight  · Do not lift anything heavier than 10 pounds    Heavy lifting can increase pressure in your abdominal aorta  This can increase your risk for a ruptured AAA  Follow up with your healthcare provider as directed: You will need regular tests and follow-up visits to monitor the size of your AAA  Keep all appointments  Write down your questions so you remember to ask them during your visits  © 2017 [address] [employer] [employer] Information is for End User's use only and may not be sold, redistributed or otherwise used for commercial purposes  All illustrations and images included in CareNotes® are the copyrighted property of [employer]  or [employer]  The above information is an  only  It is not intended as medical advice for individual conditions or treatments  Talk to your doctor, nurse or pharmacist before following any medical regimen to see if it is safe and effective for you

## 2019-08-05 NOTE — ASSESSMENT & PLAN NOTE
Lab Results   Component Value Date    HGBA1C 6 1 10/30/2017       No results for input(s): POCGLU in the last 72 hours      Blood Sugar Average: Last 72 hrs:  -continue with optimal bs control per PCP

## 2019-08-05 NOTE — ASSESSMENT & PLAN NOTE
68yo male with PMH HTN, HLD, CAD/AS s/p AVR/CABG 2015, DM, +smoker, AAA without rupture s/p EVAR with right iliac compression 1/2017 followed by bilateral iliac angiogram with angioplasty and treatment of right iliac stenosis by Dr Erika Bentley (1/2018)  Patient returns to the office for risk factor modification visit and review of recent EVAR duplex  Patient denies any symptoms at this time to back/abdomen and lower ext  Imaging reviewed with patient at today's visit shows the AAA post EVAR widely patent endograft without evidence of endoleak  Sac size is 5 1 cm (Prior 4 9 cm)  Right limb is patent s/p angioplasty    EWA R: 1 12 with , L: 122 with     Recommendations:  -continue with optimal medical management including ASA, Lipitor and good BP control  -no recent lipid panel; defer to PCP for f/u  -continue with surveillance per EVAR protocol; will need CT abd/pelvis in 2/2020  -f/u with Dr Erika Bentley after CT scan  -smoking cessation discussed; patient having a hard time with quitting; discussed behavioral/habitual changes and advised him to talk to PCP for further cessation guidance including medication; patient does not express interest to quit; continue counseling  -continue with low-fat, low-chol diet  -continue with exercise and healthy lifestyle

## 2019-08-05 NOTE — PROGRESS NOTES
Assessment/Plan:    AAA (abdominal aortic aneurysm) ([facility] [address] )  66yo male with PMH HTN, HLD, CAD/AS s/p AVR/CABG 2015, DM, +smoker, AAA without rupture s/p EVAR with right iliac compression 1/2017 followed by bilateral iliac angiogram with angioplasty and treatment of right iliac stenosis by Dr Ruth Cantu (1/2018)  Patient returns to the office for risk factor modification visit and review of recent EVAR duplex  Patient denies any symptoms at this time to back/abdomen and lower ext  Imaging reviewed with patient at today's visit shows the AAA post EVAR widely patent endograft without evidence of endoleak  Sac size is 5 1 cm (Prior 4 9 cm)  Right limb is patent s/p angioplasty  EWA R: 1 12 with , L: 122 with     Recommendations:  -continue with optimal medical management including ASA, Lipitor and good BP control  -no recent lipid panel; defer to PCP for f/u  -continue with surveillance per EVAR protocol; will need CT abd/pelvis in 2/2020  -f/u with Dr Ruth Cantu after CT scan  -smoking cessation discussed; patient having a hard time with quitting; discussed behavioral/habitual changes and advised him to talk to PCP for further cessation guidance including medication; patient does not express interest to quit; continue counseling  -continue with low-fat, low-chol diet  -continue with exercise and healthy lifestyle         Diabetes mellitus ([facility] [address] )  Lab Results   Component Value Date    HGBA1C 6 1 10/30/2017       No results for input(s): POCGLU in the last 72 hours  Blood Sugar Average: Last 72 hrs:  -continue with optimal bs control per PCP       Diagnoses and all orders for this visit:    Abdominal aortic aneurysm (AAA) without rupture ([facility] [address] )    Type 2 diabetes mellitus with other circulatory complication, without long-term current use of insulin ([facility] [address] )    Hypertension, unspecified type    Hyperlipidemia, unspecified hyperlipidemia type          Subjective:      Patient ID: Natali Rodrigez is a 68 y o  male     Pt is here today to review results of EVAR duplex done 7/31/2019  Pt has a hx of EVAR done 1/6/2017 by Dr Nannette Mcfarland and angiogram with bilateral iliac artery ballooning done 1/26/2018 by Dr Nannette Mcfarland  Pt denies any LBP, abd pain, or pain after eating  He c/o of some numbness in both feet  He also says that he "feels both of his legs" since the procedure  He denies pain when walking or any rest pain  Pt is currently taking ASA 81 mg and Atorvastatin 40 mg  Pt is a daily smoker  68yo male with PMH HTN, HLD, CAD/AS s/p AVR/CABG 2015, DM, +smoker, AAA without rupture s/p EVAR with right iliac compression 1/2017 followed by bilateral iliac angiogram with angioplasty and treatment of right iliac stenosis by Dr Nannette Mcfarland (1/2018)  Patient returns to the office for risk factor modification visit and review of recent EVAR duplex  Patient denies any symptoms at this time to back/abdomen and lower extremities  He continues to smoke  He takes aspirin and statin daily  The following portions of the patient's history were reviewed and updated as appropriate: allergies, current medications, past family history, past medical history, past social history, past surgical history and problem list     Review of Systems   Constitutional: Negative  HENT: Negative  Eyes: Negative  Respiratory: Positive for cough  Cardiovascular: Negative  Gastrointestinal: Negative  Endocrine: Negative  Genitourinary: Positive for dysuria  Musculoskeletal: Positive for arthralgias and back pain  Skin: Negative  Allergic/Immunologic: Negative  Neurological: Positive for numbness  Hematological: Bruises/bleeds easily  Psychiatric/Behavioral: The patient is nervous/anxious            Objective:      /72 (BP Location: Right arm, Patient Position: Sitting, Cuff Size: Standard)   Pulse 62   Temp 98 7 °F (37 1 °C) (Tympanic)   Ht 5' 9" (1 753 m)   Wt 72 6 kg (160 lb)   BMI 23 63 kg/m²          Physical Exam   Constitutional: He is oriented to person, place, and time  He appears well-developed and well-nourished  HENT:   Head: Normocephalic and atraumatic  Eyes: Pupils are equal, round, and reactive to light  EOM are normal  No scleral icterus  Neck: Normal range of motion  Cardiovascular: Normal rate, regular rhythm and normal heart sounds  Pulses:       Radial pulses are 2+ on the right side, and 2+ on the left side  Popliteal pulses are 0 on the right side, and 0 on the left side  Dorsalis pedis pulses are 1+ on the right side, and 1+ on the left side  Posterior tibial pulses are 1+ on the right side, and 1+ on the left side  Pulmonary/Chest: Effort normal and breath sounds normal    Abdominal: Soft  Normal appearance and bowel sounds are normal  He exhibits no abdominal bruit and no pulsatile midline mass  Musculoskeletal: Normal range of motion  Neurological: He is alert and oriented to person, place, and time  He has normal strength  Skin: Skin is warm, dry and intact  Capillary refill takes less than 2 seconds  Psychiatric: He has a normal mood and affect  His speech is normal and behavior is normal  Judgment and thought content normal  Cognition and memory are normal    Nursing note and vitals reviewed  I have reviewed and made appropriate changes to the review of systems input by the medical assistant      Vitals:    08/05/19 1114 08/05/19 1117   BP: (!) 172/68 164/72   BP Location: Left arm Right arm   Patient Position: Sitting Sitting   Cuff Size: Standard Standard   Pulse: 62    Temp: 98 7 °F (37 1 °C)    TempSrc: Tympanic    Weight: 72 6 kg (160 lb)    Height: 5' 9" (1 753 m)        Patient Active Problem List   Diagnosis    [facility])    Urinary frequency    Diabetes mellitus ([facility] [address] )    Hypertension    Hyperlipidemia       Past Surgical History:   Procedure Laterality Date    AORTIC VALVE REPLACEMENT      APPENDECTOMY      CARDIAC SURGERY      CORONARY ARTERY BYPASS GRAFT      LAPAROSCOPIC COLON RESECTION      LEG SURGERY      [facility] REMOVED FROM LOWER EXTREMITIES    [place] AAA REPAIR,AORTO-AORTIC TUBE PROSTH N/A 1/6/2017    Procedure: REPAIR ANEURYSM ENDOVASCULAR ABDOMINAL AORTIC  (EVAR); Surgeon: Favian Gandhi MD;  Location: [facility];  Service: Vascular    MS [facility] 3RD+ ORD SLCTV ABDL PEL/LXTR [facility] N/A 1/26/2018    Procedure: ANGIOGRAM; BILATERAL ILIAC ARTERY BALLOONING;  Surgeon: Favian Gandhi MD;  [facility]: [facility];  Service: Vascular    VALVE REPLACEMENT         History reviewed  No pertinent family history      Social History     Socioeconomic History    Marital status: /Civil Union     Spouse name: Not on file    Number of children: Not on file    Years of education: Not on file    Highest education level: Not on file   Occupational History    Not on file   Social Needs    Financial resource strain: Not on file    Food insecurity:     Worry: Not on file     Inability: Not on file    Transportation needs:     Medical: Not on file     Non-medical: Not on file   Tobacco Use    Smoking status: Current Every Day Smoker     Packs/day: 0 50     Years: 30 00     Pack years: 15 00    Smokeless tobacco: Never Used   Substance and Sexual Activity    Alcohol use: Yes     Comment: SOCIAL    Drug use: No    Sexual activity: Never   Lifestyle    Physical activity:     Days per week: Not on file     Minutes per session: Not on file    Stress: Not on file   Relationships    Social connections:     Talks on phone: Not on file     Gets together: Not on file     Attends [employer] service: Not on file     Active member of club or organization: Not on file     Attends meetings of clubs or organizations: Not on file     Relationship status: Not on file    Intimate partner violence:     Fear of current or ex partner: Not on file     Emotionally abused: Not on file     Physically abused: Not on file     Forced sexual activity: Not on file   Other Topics Concern    Not on file   Social History Narrative    Not on file       No Known Allergies      Current Outpatient Medications:     aspirin 81 mg chewable tablet, Chew 81 mg daily  , Disp: , Rfl:     atorvastatin (LIPITOR) 40 mg tablet, Take 40 mg by mouth daily  , Disp: , Rfl:     citalopram (CeleXA) 40 mg tablet, , Disp: , Rfl:     hydrochlorothiazide (HYDRODIURIL) 25 mg tablet, Take by mouth daily  , Disp: , Rfl:     JANUMET XR  MG TB24, , Disp: , Rfl:     lisinopril (ZESTRIL) 10 mg tablet, Lisinopril 10 MG Oral Tablet TAKE 1 TABLET DAILY AS DIRECTED  Quantity: 30;  Refills: 3    [facility];  Started 12-Nov-2015 Active, Disp: , Rfl:     solifenacin (VESICARE) 10 MG tablet, Take 0 5 tablets (5 mg total) by mouth daily, Disp: 90 tablet, Rfl: 3    acetaminophen (TYLENOL) 325 mg tablet, Take 1 tablet every 6 hr as needed for pain   (Patient not taking: Reported on 6/13/2019), Disp: 30 tablet, Rfl: 0    canagliflozin (INVOKANA) 100 mg, Take 100 mg by mouth daily before breakfast Patient takes 1 ans 1/2 pills to total 150mg , Disp: , Rfl:     oxybutynin (DITROPAN) 5 mg tablet, Take 5 mg by mouth 2 (two) times a day  , Disp: , Rfl:

## 2019-08-14 NOTE — PROGRESS NOTES
Cardiology Follow Up    Evan Rojas  1945  [id number]  [facility]  [address]  [address]  [phone]  [phone]    1  Essential (primary) hypertension  Echo complete with contrast if indicated   2  Pure hypercholesterolemia  Echo complete with contrast if indicated   3  Coronary arteriosclerosis  Echo complete with contrast if indicated   4  S/P AVR  Echo complete with contrast if indicated       Interval History:  Patient is here for a follow-up visit  He was most recently seen by me in July of last year  Patient has a #23 Magna Ease bovine pericardial valve in place  Aortic valve replacement and saphenous vein graft to the right coronary artery were performed November 2015  He is also followed by vascular surgery in reference to prior EVAR of an abdominal aneurysm  His most recent echocardiogram done July 12th of this year demonstrated preserved LV systolic function with mild LVH and an appropriately functioning bioprosthesis noted in the aortic position  Mild paravalvular regurgitation was noted  There was no significant change compared to a prior study done June 4, 2018  He has been well  He has had no chest pain or significant dyspnea  His blood pressure is elevated today but he did not yet take his morning blood pressure pills  I instructed him to do this when he arrives home      Patient Active Problem List   Diagnosis    AAA (abdominal aortic aneurysm) (HCC)    Urinary frequency    Diabetes mellitus ([address] )    Hypertension    Hyperlipidemia     Past Medical History:   Diagnosis Date    AAA (abdominal aortic aneurysm) (HCC)     Diabetes mellitus ([address] )     Diverticulosis     GERD (gastroesophageal reflux disease)     Hyperlipidemia     Hypertension     Sleep apnea     NOT DIAGNOSED     Social History     Socioeconomic History    Marital status: /Civil Union     Spouse name: Not on file    Number of children: Not on file    Years of education: Not on file    Highest education level: Not on file   Occupational History    Not on file   Social Needs    Financial resource strain: Not on file    Food insecurity:     Worry: Not on file     Inability: Not on file    Transportation needs:     Medical: Not on file     Non-medical: Not on file   Tobacco Use    Smoking status: Current Every Day Smoker     Packs/day: 0 50     Years: 30 00     Pack years: 15 00    Smokeless tobacco: Never Used   Substance and Sexual Activity    Alcohol use: Yes     Comment: SOCIAL    Drug use: No    Sexual activity: Never   Lifestyle    Physical activity:     Days per week: Not on file     Minutes per session: Not on file    Stress: Not on file   Relationships    Social connections:     Talks on phone: Not on file     Gets together: Not on file     Attends Tenriism service: Not on file     Active member of club or organization: Not on file     Attends meetings of clubs or organizations: Not on file     Relationship status: Not on file    Intimate partner violence:     Fear of current or ex partner: Not on file     Emotionally abused: Not on file     Physically abused: Not on file     Forced sexual activity: Not on file   Other Topics Concern    Not on file   Social History Narrative    Not on file      No family history on file  Past Surgical History:   Procedure Laterality Date    AORTIC VALVE REPLACEMENT      APPENDECTOMY      CARDIAC SURGERY      CORONARY ARTERY BYPASS GRAFT      LAPAROSCOPIC COLON RESECTION      LEG SURGERY      [facility] REMOVED FROM LOWER EXTREMITIES    [place] AAA REPAIR,AORTO-AORTIC TUBE PROSTH N/A 1/6/2017    Procedure: REPAIR ANEURYSM ENDOVASCULAR ABDOMINAL AORTIC  (EVAR);   Surgeon: Filiberto Hawkins MD;  [facility];  Service: Vascular    [place] SLCTV CATHJ 3RD+ ORD SLCTV ABDL PEL/LXTR [facility] N/A 1/26/2018    Procedure: ANGIOGRAM; BILATERAL ILIAC ARTERY BALLOONING; Surgeon: Ann Arthur MD;  [facility];  Service: Vascular    VALVE REPLACEMENT         Current Outpatient Medications:     acetaminophen (TYLENOL) 325 mg tablet, Take 1 tablet every 6 hr as needed for pain , Disp: 30 tablet, Rfl: 0    aspirin 81 mg chewable tablet, Chew 81 mg daily  , Disp: , Rfl:     atorvastatin (LIPITOR) 40 mg tablet, Take 40 mg by mouth daily  , Disp: , Rfl:     citalopram (CeleXA) 40 mg tablet, , Disp: , Rfl:     hydrochlorothiazide (HYDRODIURIL) 25 mg tablet, Take by mouth daily  , Disp: , Rfl:     JANUMET XR  MG TB24, daily , Disp: , Rfl:     lisinopril (ZESTRIL) 10 mg tablet, Lisinopril 10 MG Oral Tablet TAKE 1 TABLET DAILY AS DIRECTED  Quantity: 30;  Refills: 3    [facility];  Started 12-Nov-2015 Active, Disp: , Rfl:     solifenacin (VESICARE) 10 MG tablet, Take 0 5 tablets (5 mg total) by mouth daily, Disp: 90 tablet, Rfl: 3    canagliflozin (INVOKANA) 100 mg, Take 100 mg by mouth daily before breakfast Patient takes 1 ans 1/2 pills to total 150mg , Disp: , Rfl:     oxybutynin (DITROPAN) 5 mg tablet, Take 5 mg by mouth 2 (two) times a day  , Disp: , Rfl:   No Known Allergies    Labs:not applicable  Imaging: Vas Evar Endovascular Aortic Repair Duplex    Result Date: 7/31/2019  Narrative:  [facility] REPORT CLINICAL: Indications: Abdominal Aortic Aneurysm w/o Rupture [I71 4]  Post operative surveillance protocol for AAA s/p EVAR  Operative History: 2017-06-01 EVAR 2015-11-09 Aortic Valve Replacement & CABG Risk Factors The patient has history of HTN, HLD and CAD  Clinical Right Pressure:  155/ mm Hg, Left Pressure:  147/ mm Hg    FINDINGS:  Unilateral                PSV  EDV  AP (cm)  TRV (cm)  AAA Sac                                 5 1       5 1  Prox Fixation              84    0                     Prox Main Stem Endograft  115    0                     Sup-Sarah Ao                 85   15      2 1            Celiac 159   35                     Prox  SMA                 180   21                      Right                      Impression  PSV  EDV  AP (cm)  Prox Limb Endograft                     87    0           Mid Limb Endograft                      95    0           Dist Limb Endograft                     86   13           Distal endograft fixation               97    0           Dist TEJAL                   Ectatic      76    0      1 5  Prox  EIA                              109    0           Dist EIA                               119    0           Prox Renal                             166   31            Left                       Impression  PSV  EDV  AP (cm)  Prox Limb Endograft                     68    0           Mid Limb Endograft                      80    0           Dist Limb Endograft                     66    0           Distal endograft fixation               87    0           Dist TEJAL                   Ectatic      68    0      1 7  Prox  EIA                               86    0           Dist EIA                   Tortuous     87    0           Prox Renal                             121   20              CONCLUSION: Impression THIS IS A FOLLOW UP EXAM Duplex evaluation of the AAA post EVAR shows a widely patent endograft without evidence of endoleak  Sac size is 5 1 cm (Prior 4 9 cm)  Right limb is patent s/p angioplasty  The aorta proximal to the graft measures 2 1 cm (Prior 2 0 cm) The iliac arteries distal to the graft measure: Rt - 1 5 (Prior 1 5) and Lt - 1 7 (Prior 1 8) Renal arteries are patent bilaterally  The celiac and superior mesenteric arteries are patent  Ankle/Brachial indices: Rt - 1 12 (Prior 1 13) and Lt - 1 22 (Prior 1 30) Great toe pressures of: Rt - 117 mmHg and Lt - 104 mmHg, within the healing range  PVR/ PPG tracings are normal  Compared to previous study on 05/07/2018, there is no significant change  Recommend repeat testing in 1 year as per protocol unless otherwise indicated  SIGNATURE: Electronically Signed by: Mer Yusuf MD, [address] on 2019-07-31 12:06:25 PM      Review of Systems:  Review of Systems   All other systems reviewed and are negative  Physical Exam:  /80 (BP Location: Right arm, Patient Position: Sitting, Cuff Size: Standard)   Pulse 66   Ht 5' 9" (1 753 m)   Wt 73 kg (161 lb)   BMI 23 78 kg/m²   Physical Exam   Constitutional: He is oriented to person, place, and time  He appears well-developed and well-nourished  HENT:   Head: Normocephalic and atraumatic  Eyes: Pupils are equal, round, and reactive to light  Conjunctivae are normal    Neck: Normal range of motion  Neck supple  Cardiovascular: Normal rate and normal heart sounds  Pulmonary/Chest: Effort normal and breath sounds normal    Neurological: He is alert and oriented to person, place, and time  Skin: Skin is warm and dry  Psychiatric: He has a normal mood and affect  Vitals reviewed  Discussion/Summary:I will continue the patient's present medical regimen  The patient appears well compensated  I have asked the patient to call if there is a problem in the interim otherwise I will see the patient in one years time  Patient is due for an echocardiogram prior to the next visit to assess LV wall thickness and systolic function

## 2019-08-19 ENCOUNTER — OFFICE VISIT (OUTPATIENT)
Dept: CARDIOLOGY CLINIC | Facility: CLINIC | Age: 74
End: 2019-08-19
Payer: MEDICARE

## 2019-08-19 VITALS
SYSTOLIC BLOOD PRESSURE: 168 MMHG | WEIGHT: 161 LBS | DIASTOLIC BLOOD PRESSURE: 80 MMHG | HEIGHT: 69 IN | HEART RATE: 66 BPM | BODY MASS INDEX: 23.85 KG/M2

## 2019-08-19 DIAGNOSIS — I10 ESSENTIAL (PRIMARY) HYPERTENSION: Primary | ICD-10-CM

## 2019-08-19 DIAGNOSIS — I25.10 CORONARY ARTERIOSCLEROSIS: ICD-10-CM

## 2019-08-19 DIAGNOSIS — E78.00 PURE HYPERCHOLESTEROLEMIA: ICD-10-CM

## 2019-08-19 DIAGNOSIS — Z95.2 S/P AVR: ICD-10-CM

## 2019-08-19 PROCEDURE — 99214 OFFICE O/P EST MOD 30 MIN: CPT | Performed by: INTERNAL MEDICINE

## 2019-09-13 ENCOUNTER — APPOINTMENT (OUTPATIENT)
Dept: LAB | Facility: HOSPITAL | Age: 74
End: 2019-09-13
Attending: UROLOGY
Payer: MEDICARE

## 2019-09-13 DIAGNOSIS — R35.0 URINARY FREQUENCY: ICD-10-CM

## 2019-09-13 LAB — PSA SERPL-MCNC: 2 NG/ML (ref 0–4)

## 2019-09-13 PROCEDURE — 84153 ASSAY OF PSA TOTAL: CPT

## 2019-09-17 ENCOUNTER — OFFICE VISIT (OUTPATIENT)
Dept: UROLOGY | Facility: CLINIC | Age: 74
End: 2019-09-17
Payer: MEDICARE

## 2019-09-17 VITALS
HEIGHT: 69 IN | DIASTOLIC BLOOD PRESSURE: 72 MMHG | SYSTOLIC BLOOD PRESSURE: 160 MMHG | WEIGHT: 164 LBS | HEART RATE: 84 BPM | BODY MASS INDEX: 24.29 KG/M2

## 2019-09-17 DIAGNOSIS — R35.0 URINARY FREQUENCY: Primary | ICD-10-CM

## 2019-09-17 LAB — POST-VOID RESIDUAL VOLUME, ML POC: 43 ML

## 2019-09-17 PROCEDURE — 51798 US URINE CAPACITY MEASURE: CPT | Performed by: PHYSICIAN ASSISTANT

## 2019-09-17 PROCEDURE — 99213 OFFICE O/P EST LOW 20 MIN: CPT | Performed by: PHYSICIAN ASSISTANT

## 2019-09-17 RX ORDER — TOLTERODINE 4 MG/1
4 CAPSULE, EXTENDED RELEASE ORAL DAILY
Qty: 90 CAPSULE | Refills: 1 | Status: SHIPPED | OUTPATIENT
Start: 2019-09-17 | End: 2019-12-16 | Stop reason: ALTCHOICE

## 2019-09-17 NOTE — PROGRESS NOTES
9/17/2019    Oswaldo Sydneykaren  1945  [id number]    Discussion and Plan    1  Overactive bladder - managed by Dr Shira Persaud  - symptoms not well managed on oxybutynin 5 mg twice daily  - overall had symptom improvement with VESIcare 5 mg daily however this is cost prohibitive  - will try Detrol 4 mg extended release daily  side effect profile reviewed  - patient not a good candidate for beta 3 agonist given risk of hypertension with history of AAA  - patient will follow up in 3-4 months for PVR in symptom reassessment  He is encouraged to contact us in the interim with any concerns  2  Routine prostate cancer screening  - PSA of 2 0 (09/13/2019)  - Prostate exam in June 2019 noted to be 40 g without nodularity  - routine prostate cancer screening can be discontinued      Patient Active Problem List   Diagnosis    [facility]    Urinary frequency    Diabetes mellitus ([facility] )    Hypertension    Hyperlipidemia       History of Present Illness    Trice Murray is a 68 y o  male patient of Dr Shira Persaud with a history o urinary urgency with scant urge incontinence presenting for follow-up  Symptoms have been present for greater than 1 year's time  He describes sudden urgency with leakage of small amounts before he can reach a restroom  Notes also concurrent bowel symptoms as well  He has an extensive abdominal surgical history including AAA repair  Denies any prior history of hematuria or urinary tract infection  Notes otherwise adequate flow with complete bladder emptying sensation  No prior genitourinary surgical history  He had tried oxybutynin previously with no effect however has noted some in symptom improvement with VESIcare at 5 mg  Patient is diabetic  He denies any neurologic complaints  Had his last appointment his VESIcare was increased to 10 mg  Has unfortunately was cost prohibitive there for was transitioned to oxybutynin 10 mg extended release daily    Patient unfortunately reports that this is not been adequate and continues to have urinary frequency and urgency  Recent PSA of 2 0 (09/13/2019)  Prostate exam in June 2019 noted to be 40 g without nodularity  Postvoid residual 43 mL  Urinary Incontinence Screening      Most Recent Value   Urinary Incontinence   Urinary Incontinence? Yes   Incomplete emptying? No   Urinary frequency? Yes   Urinary urgency? Yes   Urinary hesitancy? Yes   Dysuria (painful difficult urination)? No   Nocturia (waking up to use the bathroom)? No [only if pt drinks beer ]   Straining (having to push to go)? No   Weak stream?  Yes   Intermittent stream?  Yes   Post void dribbling? No              Past Medical History  Past Medical History:   Diagnosis Date    [facility]ab[facility]) [facility] )     Diabetes mellitus ([facility] )     Diverticulosis     GERD (gastroesophageal reflux disease)     Hyperlipidemia     Hypertension     Sleep apnea     NOT DIAGNOSED       Past Social History  Past Surgical History:   Procedure Laterality Date    AORTIC VALVE REPLACEMENT      APPENDECTOMY      CARDIAC SURGERY      CORONARY ARTERY BYPASS GRAFT      LAPAROSCOPIC COLON RESECTION      LEG SURGERY      [facility] REMOVED FROM LOWER EXTREMITIES    [place] AAA REPAIR,AORTO-AORTIC TUBE PROSTH N/A 1/6/2017    Procedure: REPAIR ANEURYSM ENDOVASCULAR ABDOMINAL AORTIC  (EVAR); Surgeon: Favian Gandhi MD;  [facility];  Service: Vascular    [place] [facility] Car[facility] 3RD+ ORD SLCTV ABDL PEL/LXTR [facility] N/A 1/26/2018    Procedure: ANGIOGRAM; BILATERAL ILIAC ARTERY BALLOONING;  Surgeon: Favian Gandhi MD;  [facility];  Service: Vascular    VALVE REPLACEMENT         Past Family History  History reviewed  No pertinent family history      Past Social history  Social History     Socioeconomic History    Marital status: /Civil Union     Spouse name: Not on file    Number of children: Not on file    Years of education: Not on file   Jennifer Boyce Highest education level: Not on file   Occupational History    Not on file   Social Needs    Financial resource strain: Not on file    Food insecurity:     Worry: Not on file     Inability: Not on file    Transportation needs:     Medical: Not on file     Non-medical: Not on file   Tobacco Use    Smoking status: Current Every Day Smoker     Packs/day: 0 50     Years: 30 00     Pack years: 15 00    Smokeless tobacco: Never Used   Substance and Sexual Activity    Alcohol use: Yes     Comment: SOCIAL    Drug use: No    Sexual activity: Never   Lifestyle    Physical activity:     Days per week: Not on file     Minutes per session: Not on file    Stress: Not on file   Relationships    Social connections:     Talks on phone: Not on file     Gets together: Not on file     Attends Restoration service: Not on file     Active member of club or organization: Not on file     Attends meetings of clubs or organizations: Not on file     Relationship status: Not on file    Intimate partner violence:     Fear of current or ex partner: Not on file     Emotionally abused: Not on file     Physically abused: Not on file     Forced sexual activity: Not on file   Other Topics Concern    Not on file   Social History Narrative    Not on file       Current Medications  Current Outpatient Medications   Medication Sig Dispense Refill    acetaminophen (TYLENOL) 325 mg tablet Take 1 tablet every 6 hr as needed for pain  30 tablet 0    aspirin 81 mg chewable tablet Chew 81 mg daily        atorvastatin (LIPITOR) 40 mg tablet Take 40 mg by mouth daily        citalopram (CeleXA) 40 mg tablet       hydrochlorothiazide (HYDRODIURIL) 25 mg tablet Take by mouth daily        JANUMET XR  MG TB24 daily       lisinopril (ZESTRIL) 10 mg tablet Lisinopril 10 MG Oral Tablet  TAKE 1 TABLET DAILY AS DIRECTED     Quantity: 30;  Refills: 3       [facility];  Started 12-Nov-2015  Active      canagliflozin (INVOKANA) 100 mg Take 100 mg by mouth daily before breakfast Patient takes 1 ans 1/2 pills to total 150mg       tolterodine (DETROL LA) 4 mg 24 hr capsule Take 1 capsule (4 mg total) by mouth daily 90 capsule 1     No current facility-administered medications for this visit  Allergies  No Known Allergies    Past Medical History, Social History, Family History, medications and allergies were reviewed  Review of Systems  Review of Systems   Constitutional: Negative  HENT: Negative  Eyes: Negative  Respiratory: Negative  Cardiovascular: Negative  Gastrointestinal: Negative  Endocrine: Negative  Genitourinary: Positive for urgency  Negative for decreased urine volume, difficulty urinating and hematuria  Musculoskeletal: Negative  Skin: Negative  Neurological: Negative  Hematological: Negative  Psychiatric/Behavioral: Negative  Vitals  Vitals:    09/17/19 0810   BP: 160/72   Pulse: 84   Weight: 74 4 kg (164 lb)   Height: 5' 9" (1 753 m)         Physical Exam    Physical Exam   Constitutional: He is oriented to person, place, and time  He appears well-developed and well-nourished  HENT:   Head: Normocephalic and atraumatic  Eyes: Pupils are equal, round, and reactive to light  Neck: Normal range of motion  Cardiovascular: Normal rate, regular rhythm and normal heart sounds  Pulmonary/Chest: Effort normal and breath sounds normal  No accessory muscle usage  No respiratory distress  Abdominal: Soft  Normal appearance and bowel sounds are normal  There is no tenderness  Genitourinary: Rectum normal, prostate normal and penis normal  No penile tenderness  Genitourinary Comments: Prostate approximately 40 g  No nodule   Musculoskeletal: Normal range of motion  Neurological: He is alert and oriented to person, place, and time  Skin: Skin is warm, dry and intact  Psychiatric: He has a normal mood and affect   His speech is normal  Cognition and memory are normal  Nursing note and vitals reviewed  Results    Below listed labs, pathology results, and radiology images were personally reviewed:    Lab Results   Component Value Date/Time    PSA 2 0 09/13/2019 12:01 PM    PSA 1 2 10/30/2017 08:05 AM     Lab Results   Component Value Date    GLUCOSE 72 11/26/2015    CALCIUM 9 8 01/22/2018     11/26/2015    K 4 5 01/22/2018    CO2 33 (H) 01/22/2018     01/22/2018    BUN 20 01/22/2018    CREATININE 0 78 01/22/2018     Lab Results   Component Value Date    WBC 6 53 01/22/2018    HGB 17 1 (H) 01/22/2018    HCT 48 2 01/22/2018    MCV 92 01/22/2018     (L) 01/22/2018       Recent Results (from the past 1 hour(s))   POCT Measure PVR    Collection Time: 09/17/19  8:10 AM   Result Value Ref Range    POST-VOID RESIDUAL VOLUME, ML POC 43 mL   ]      Post Void Residual Measurement:  After voiding to completion the patient was brought to the exam room and positioned supine    Residual urine volume was measured with an ultrasound at:    0 ml

## 2019-12-16 ENCOUNTER — APPOINTMENT (OUTPATIENT)
Dept: LAB | Facility: AMBULARY SURGERY CENTER | Age: 74
End: 2019-12-16
Payer: MEDICARE

## 2019-12-16 ENCOUNTER — TELEPHONE (OUTPATIENT)
Dept: UROLOGY | Facility: CLINIC | Age: 74
End: 2019-12-16

## 2019-12-16 ENCOUNTER — OFFICE VISIT (OUTPATIENT)
Dept: UROLOGY | Facility: CLINIC | Age: 74
End: 2019-12-16
Payer: MEDICARE

## 2019-12-16 ENCOUNTER — TRANSCRIBE ORDERS (OUTPATIENT)
Dept: LAB | Facility: AMBULARY SURGERY CENTER | Age: 74
End: 2019-12-16

## 2019-12-16 VITALS
WEIGHT: 165 LBS | HEART RATE: 50 BPM | BODY MASS INDEX: 24.44 KG/M2 | HEIGHT: 69 IN | SYSTOLIC BLOOD PRESSURE: 116 MMHG | DIASTOLIC BLOOD PRESSURE: 70 MMHG

## 2019-12-16 DIAGNOSIS — R53.83 FATIGUE, UNSPECIFIED TYPE: ICD-10-CM

## 2019-12-16 DIAGNOSIS — E13.9 DIABETES MELLITUS OF OTHER TYPE WITHOUT COMPLICATION, UNSPECIFIED WHETHER LONG TERM INSULIN USE (HCC): ICD-10-CM

## 2019-12-16 DIAGNOSIS — R35.0 URINARY FREQUENCY: Primary | ICD-10-CM

## 2019-12-16 DIAGNOSIS — E13.9 DIABETES MELLITUS OF OTHER TYPE WITHOUT COMPLICATION, UNSPECIFIED WHETHER LONG TERM INSULIN USE (HCC): Primary | ICD-10-CM

## 2019-12-16 DIAGNOSIS — E78.41 ELEVATED LIPOPROTEIN A LEVEL: ICD-10-CM

## 2019-12-16 DIAGNOSIS — N32.81 OAB (OVERACTIVE BLADDER): ICD-10-CM

## 2019-12-16 LAB
ALBUMIN SERPL BCP-MCNC: 3.7 G/DL (ref 3.5–5)
ALP SERPL-CCNC: 64 U/L (ref 46–116)
ALT SERPL W P-5'-P-CCNC: 19 U/L (ref 12–78)
ANION GAP SERPL CALCULATED.3IONS-SCNC: 3 MMOL/L (ref 4–13)
AST SERPL W P-5'-P-CCNC: 7 U/L (ref 5–45)
BASOPHILS # BLD AUTO: 0.02 THOUSANDS/ΜL (ref 0–0.1)
BASOPHILS NFR BLD AUTO: 0 % (ref 0–1)
BILIRUB SERPL-MCNC: 0.52 MG/DL (ref 0.2–1)
BUN SERPL-MCNC: 20 MG/DL (ref 5–25)
CALCIUM SERPL-MCNC: 9.5 MG/DL (ref 8.3–10.1)
CHLORIDE SERPL-SCNC: 106 MMOL/L (ref 100–108)
CHOLEST SERPL-MCNC: 104 MG/DL (ref 50–200)
CO2 SERPL-SCNC: 31 MMOL/L (ref 21–32)
CREAT SERPL-MCNC: 0.87 MG/DL (ref 0.6–1.3)
CREAT UR-MCNC: 282 MG/DL
EOSINOPHIL # BLD AUTO: 0.08 THOUSAND/ΜL (ref 0–0.61)
EOSINOPHIL NFR BLD AUTO: 1 % (ref 0–6)
ERYTHROCYTE [DISTWIDTH] IN BLOOD BY AUTOMATED COUNT: 12 % (ref 11.6–15.1)
EST. AVERAGE GLUCOSE BLD GHB EST-MCNC: 123 MG/DL
GFR SERPL CREATININE-BSD FRML MDRD: 86 ML/MIN/1.73SQ M
GLUCOSE P FAST SERPL-MCNC: 107 MG/DL (ref 65–99)
HBA1C MFR BLD: 5.9 % (ref 4.2–6.3)
HCT VFR BLD AUTO: 48 % (ref 36.5–49.3)
HDLC SERPL-MCNC: 44 MG/DL
HGB BLD-MCNC: 16.1 G/DL (ref 12–17)
IMM GRANULOCYTES # BLD AUTO: 0.04 THOUSAND/UL (ref 0–0.2)
IMM GRANULOCYTES NFR BLD AUTO: 1 % (ref 0–2)
LDLC SERPL CALC-MCNC: 51 MG/DL (ref 0–100)
LYMPHOCYTES # BLD AUTO: 0.97 THOUSANDS/ΜL (ref 0.6–4.47)
LYMPHOCYTES NFR BLD AUTO: 13 % (ref 14–44)
MCH RBC QN AUTO: 31.2 PG (ref 26.8–34.3)
MCHC RBC AUTO-ENTMCNC: 33.5 G/DL (ref 31.4–37.4)
MCV RBC AUTO: 93 FL (ref 82–98)
MICROALBUMIN UR-MCNC: 60.3 MG/L (ref 0–20)
MICROALBUMIN/CREAT 24H UR: 21 MG/G CREATININE (ref 0–30)
MONOCYTES # BLD AUTO: 0.67 THOUSAND/ΜL (ref 0.17–1.22)
MONOCYTES NFR BLD AUTO: 9 % (ref 4–12)
NEUTROPHILS # BLD AUTO: 5.9 THOUSANDS/ΜL (ref 1.85–7.62)
NEUTS SEG NFR BLD AUTO: 76 % (ref 43–75)
NONHDLC SERPL-MCNC: 60 MG/DL
NRBC BLD AUTO-RTO: 0 /100 WBCS
PLATELET # BLD AUTO: 165 THOUSANDS/UL (ref 149–390)
PMV BLD AUTO: 10 FL (ref 8.9–12.7)
POST-VOID RESIDUAL VOLUME, ML POC: 0 ML
POTASSIUM SERPL-SCNC: 4 MMOL/L (ref 3.5–5.3)
PROT SERPL-MCNC: 6.8 G/DL (ref 6.4–8.2)
RBC # BLD AUTO: 5.16 MILLION/UL (ref 3.88–5.62)
SODIUM SERPL-SCNC: 140 MMOL/L (ref 136–145)
T3 SERPL-MCNC: 1.1 NG/ML (ref 0.6–1.8)
TRIGL SERPL-MCNC: 44 MG/DL
TSH SERPL DL<=0.05 MIU/L-ACNC: 2.73 UIU/ML (ref 0.36–3.74)
WBC # BLD AUTO: 7.68 THOUSAND/UL (ref 4.31–10.16)

## 2019-12-16 PROCEDURE — 83036 HEMOGLOBIN GLYCOSYLATED A1C: CPT

## 2019-12-16 PROCEDURE — 36415 COLL VENOUS BLD VENIPUNCTURE: CPT

## 2019-12-16 PROCEDURE — 80053 COMPREHEN METABOLIC PANEL: CPT

## 2019-12-16 PROCEDURE — 51798 US URINE CAPACITY MEASURE: CPT | Performed by: PHYSICIAN ASSISTANT

## 2019-12-16 PROCEDURE — 84443 ASSAY THYROID STIM HORMONE: CPT

## 2019-12-16 PROCEDURE — 80061 LIPID PANEL: CPT

## 2019-12-16 PROCEDURE — 99213 OFFICE O/P EST LOW 20 MIN: CPT | Performed by: PHYSICIAN ASSISTANT

## 2019-12-16 PROCEDURE — 82043 UR ALBUMIN QUANTITATIVE: CPT

## 2019-12-16 PROCEDURE — 82570 ASSAY OF URINE CREATININE: CPT

## 2019-12-16 PROCEDURE — 85025 COMPLETE CBC W/AUTO DIFF WBC: CPT

## 2019-12-16 PROCEDURE — 84480 ASSAY TRIIODOTHYRONINE (T3): CPT

## 2019-12-16 RX ORDER — SOLIFENACIN SUCCINATE 10 MG/1
10 TABLET, FILM COATED ORAL DAILY
Qty: 30 TABLET | Refills: 6 | Status: SHIPPED | OUTPATIENT
Start: 2019-12-16 | End: 2020-06-16

## 2019-12-19 NOTE — TELEPHONE ENCOUNTER
Patient with history of urinary urgency and rare urge incontinence  He has tried oxybutynin and tolterodine  in the past and is currently on vesicare  Called and left a message for patient to call back to discuss scheduling

## 2019-12-20 NOTE — TELEPHONE ENCOUNTER
Called and spoke with patient  Reviewed PTNS at length and need for bladder diary  Patient reports he will not have an insurance change as of the first of the year  Will mail the bladder diary forms to the patient  Will then schedule a FU appt in a few weeks to review the bladder diary and schedule PTNS, this is scheduled 1/16/20 at 9 am       Carmen Nguyen was sent to the pharmacy at last appt as patient feels that has worked the best in the past   He reports he received a letter from his insurance that they will not cover it in the new year  Will see if we can start a prior authorization to approve coverage

## 2020-01-13 ENCOUNTER — TELEPHONE (OUTPATIENT)
Dept: UROLOGY | Facility: MEDICAL CENTER | Age: 75
End: 2020-01-13

## 2020-01-13 NOTE — TELEPHONE ENCOUNTER
This is a patient of Dr Griselda Peeling seen by Devora Kat in [place]  Patient wants to cancel his nurse visit on 1/16/20  If there are any questions please call patient at [phone]

## 2020-01-14 NOTE — TELEPHONE ENCOUNTER
Called and spoke with patient's wife  She reports he changed his mind and did not want to proceed with PTNS at this time  She also reports that the generic Vesicare does not seem to be helping much either  Offered scheduling routine FU to further discuss and wife accepted  Scheduled 2/14 at 10 am  She confirmed appt details

## 2020-02-03 ENCOUNTER — HOSPITAL ENCOUNTER (OUTPATIENT)
Dept: CT IMAGING | Facility: HOSPITAL | Age: 75
Discharge: HOME | End: 2020-02-03
Attending: SURGERY
Payer: MEDICARE

## 2020-02-03 DIAGNOSIS — I71.4 ABDOMINAL AORTIC ANEURYSM WITHOUT RUPTURE (HCC): ICD-10-CM

## 2020-02-03 PROCEDURE — 74176 CT ABD & PELVIS W/O CONTRAST: CPT

## 2020-02-10 DIAGNOSIS — I71.4 ABDOMINAL AORTIC ANEURYSM (AAA) WITHOUT RUPTURE (HCC): Primary | ICD-10-CM

## 2020-02-14 ENCOUNTER — OFFICE VISIT (OUTPATIENT)
Dept: UROLOGY | Facility: CLINIC | Age: 75
End: 2020-02-14
Payer: MEDICARE

## 2020-02-14 VITALS
HEIGHT: 69 IN | BODY MASS INDEX: 24.88 KG/M2 | HEART RATE: 66 BPM | WEIGHT: 168 LBS | SYSTOLIC BLOOD PRESSURE: 160 MMHG | DIASTOLIC BLOOD PRESSURE: 80 MMHG

## 2020-02-14 DIAGNOSIS — N32.81 OAB (OVERACTIVE BLADDER): ICD-10-CM

## 2020-02-14 DIAGNOSIS — R35.0 URINARY FREQUENCY: Primary | ICD-10-CM

## 2020-02-14 LAB — POST-VOID RESIDUAL VOLUME, ML POC: 35 ML

## 2020-02-14 PROCEDURE — 51798 US URINE CAPACITY MEASURE: CPT | Performed by: PHYSICIAN ASSISTANT

## 2020-02-14 PROCEDURE — 99213 OFFICE O/P EST LOW 20 MIN: CPT | Performed by: PHYSICIAN ASSISTANT

## 2020-02-14 NOTE — PROGRESS NOTES
2/14/2020    Armand Graham  1945  [id number]    Discussion and Plan    1  Overactive bladder - previously managed by Dr Griselda Peeling  - adequately controlled on tolterodine at this time  Demonstrating adequate bladder emptying  Reviewed proper hydration dietary modifications to minimize irritative voiding symptoms  Will follow up with Urology as needed  Encouraged to contact us in the future with any concerns  All questions answered  2  Routine prostate cancer screening  - PSA of 2 0 (09/13/2019)  - Prostate exam in June 2019 noted to be 40 g without nodularity  - routine prostate cancer screening can be discontinued      Patient Active Problem List   Diagnosis    AAA [facility]abdominal aortic aneurysm) ([facility] )    Urinary frequency    Diabetes mellitus ([facility] )    Hypertension    Hyperlipidemia       History of Present Illness    Jayy Trujillo is a 69 y o  male patient previously managed by Dr Griselda Peeling presents for follow-up of urinary urgency and urge urinary incontinence  Symptoms have been present for greater than 1 year's time  He describes sudden urgency with leakage of small amounts before he can reach a restroom  Notes also concurrent bowel symptoms as well  He has an extensive abdominal surgical history including AAA repair  Denies any prior history of hematuria or urinary tract infection  Notes otherwise adequate flow with complete bladder emptying sensation  Patient had best response to VESIcare, however this was not covered by insurance  Has previously been tried on oxybutynin and most recently is on tolterodine at this time  He feels like he is adequate control of his urinary symptoms  He is able to identify exacerbating factors including alcohol intake, caffeine, and his diuretic  Recent PSA of 2 0 (09/13/2019)  Prostate exam in June 2019 noted to be 40 g without nodularity  Routine cancer screening discontinued    PVR 35mL       Urinary Incontinence Screening      Most Recent Value   Urinary Incontinence   Urinary Incontinence? Yes   Incomplete emptying? No   Urinary frequency? Yes   Urinary urgency? Yes   Urinary hesitancy? No   Dysuria (painful difficult urination)? No   Nocturia (waking up to use the bathroom)? No   Straining (having to push to go)? No   Weak stream?  No   Intermittent stream?  No   Post void dribbling? Yes            Past Medical History  Past Medical History:   Diagnosis Date    [facility]) [facility] )     [facility]     Diverticulosis     GERD (gastroesophageal reflux disease)     Hyperlipidemia     Hypertension     Sleep apnea     NOT DIAGNOSED       Past Social History  Past Surgical History:   Procedure Laterality Date    AORTIC VALVE REPLACEMENT      APPENDECTOMY      CARDIAC SURGERY      CORONARY ARTERY BYPASS GRAFT      LAPAROSCOPIC COLON RESECTION      LEG SURGERY      SCHRAPNAL REMOVED FROM LOWER EXTREMITIES    NE AAA REPAIR,AORTO-AORTIC TUBE PROSTH N/A 1/6/2017    Procedure: REPAIR ANEURYSM ENDOVASCULAR ABDOMINAL AORTIC  (EVAR); Surgeon: Bill Rao MD;  [facility]: [facility];  Service: Vascular    NE Gabe Nelson 3RD+ ORD SLCTV ABDL PEL/LXTR [facility] N/A 1/26/2018    Procedure: ANGIOGRAM; BILATERAL ILIAC ARTERY BALLOONING;  Surgeon: Bill Rao MD;  Location: [facility];  Service: Vascular    VALVE REPLACEMENT         Past Family History  History reviewed  No pertinent family history      Past Social history  Social History     Socioeconomic History    Marital status: /Civil Union     Spouse name: Not on file    Number of children: Not on file    Years of education: Not on file    Highest education level: Not on file   Occupational History    Not on file   Social Needs    Financial resource strain: Not on file    Food insecurity:     Worry: Not on file     Inability: Not on file    Transportation needs:     Medical: Not on file     Non-medical: Not on file   Tobacco Use    Smoking status: Current Every Day Smoker     Packs/day: 0 50     Years: 30 00     Pack years: 15 00    Smokeless tobacco: Never Used   Substance and Sexual Activity    Alcohol use: Yes     Comment: SOCIAL    Drug use: No    Sexual activity: Never   Lifestyle    Physical activity:     Days per week: Not on file     Minutes per session: Not on file    Stress: Not on file   Relationships    Social connections:     Talks on phone: Not on file     Gets together: Not on file     Attends Yazidi service: Not on file     Active member of club or organization: Not on file     Attends meetings of clubs or organizations: Not on file     Relationship status: Not on file    Intimate partner violence:     Fear of current or ex partner: Not on file     Emotionally abused: Not on file     Physically abused: Not on file     Forced sexual activity: Not on file   Other Topics Concern    Not on file   Social History Narrative    Not on file       Current Medications  Current Outpatient Medications   Medication Sig Dispense Refill    acetaminophen (TYLENOL) 325 mg tablet Take 1 tablet every 6 hr as needed for pain  30 tablet 0    aspirin 81 mg chewable tablet Chew 81 mg daily        atorvastatin (LIPITOR) 40 mg tablet Take 40 mg by mouth daily        canagliflozin (INVOKANA) 100 mg Take 100 mg by mouth daily before breakfast Patient takes 1 ans 1/2 pills to total 150mg       citalopram (CeleXA) 40 mg tablet       hydrochlorothiazide (HYDRODIURIL) 25 mg tablet Take by mouth daily        JANUMET XR  MG TB24 daily       lisinopril (ZESTRIL) 10 mg tablet Lisinopril 10 MG Oral Tablet  TAKE 1 TABLET DAILY AS DIRECTED  Quantity: 30;  Refills: 3       [facility];  Started 12-Nov-2015  Active      solifenacin (VESICARE) 10 MG tablet Take 1 tablet (10 mg total) by mouth daily 30 tablet 6     No current facility-administered medications for this visit          Allergies  No Known Allergies    Past Medical History, Social History, Family History, medications and allergies were reviewed  Review of Systems  Review of Systems   Constitutional: Negative  HENT: Negative  Eyes: Negative  Respiratory: Negative  Cardiovascular: Negative  Gastrointestinal: Negative  Endocrine: Negative  Genitourinary: Positive for urgency  Negative for decreased urine volume, difficulty urinating and hematuria  Musculoskeletal: Negative  Skin: Negative  Neurological: Negative  Hematological: Negative  Psychiatric/Behavioral: Negative  Vitals  Vitals:    02/14/20 0944   BP: 160/80   BP Location: Left arm   Patient Position: Sitting   Cuff Size: Adult   Pulse: 66   Weight: 76 2 kg (168 lb)   Height: 5' 9" (1 753 m)         Physical Exam    Physical Exam   Constitutional: He is oriented to person, place, and time  He appears well-developed and well-nourished  HENT:   Head: Normocephalic and atraumatic  Eyes: Pupils are equal, round, and reactive to light  Neck: Normal range of motion  Cardiovascular: Normal rate, regular rhythm and normal heart sounds  Pulmonary/Chest: Effort normal and breath sounds normal  No accessory muscle usage  No respiratory distress  Abdominal: Soft  Normal appearance and bowel sounds are normal  There is no tenderness  Genitourinary: Rectum normal, prostate normal and penis normal  No penile tenderness  Genitourinary Comments: Prostate approximately 40 g  No nodule   Musculoskeletal: Normal range of motion  Neurological: He is alert and oriented to person, place, and time  Skin: Skin is warm, dry and intact  Psychiatric: He has a normal mood and affect  His speech is normal  Cognition and memory are normal    Nursing note and vitals reviewed        Results    Below listed labs, pathology results, and radiology images were personally reviewed:    Lab Results   Component Value Date/Time    PSA 2 0 09/13/2019 12:01 PM    PSA 1 2 10/30/2017 08:05 AM     Lab Results   Component Value Date    GLUCOSE 72 11/26/2015    CALCIUM 9 5 12/16/2019     11/26/2015    K 4 0 12/16/2019    CO2 31 12/16/2019     12/16/2019    BUN 20 12/16/2019    CREATININE 0 87 12/16/2019     Lab Results   Component Value Date    WBC 7 68 12/16/2019    HGB 16 1 12/16/2019    HCT 48 0 12/16/2019    MCV 93 12/16/2019     12/16/2019       Recent Results (from the past 1 hour(s))   POCT Measure PVR    Collection Time: 02/14/20  9:52 AM   Result Value Ref Range    POST-VOID RESIDUAL VOLUME, ML POC 35 mL   ]      Post Void Residual Measurement:  After voiding to completion the patient was brought to the exam room and positioned supine    Residual urine volume was measured with an ultrasound at:    0 ml

## 2020-02-21 DIAGNOSIS — I65.23 CAROTID STENOSIS, BILATERAL: Primary | ICD-10-CM

## 2020-03-11 ENCOUNTER — OFFICE VISIT (OUTPATIENT)
Dept: VASCULAR SURGERY | Facility: CLINIC | Age: 75
End: 2020-03-11
Payer: MEDICARE

## 2020-03-11 VITALS
WEIGHT: 166 LBS | TEMPERATURE: 98 F | BODY MASS INDEX: 24.59 KG/M2 | SYSTOLIC BLOOD PRESSURE: 150 MMHG | HEART RATE: 64 BPM | HEIGHT: 69 IN | DIASTOLIC BLOOD PRESSURE: 80 MMHG

## 2020-03-11 DIAGNOSIS — I71.4 ABDOMINAL AORTIC ANEURYSM (AAA) WITHOUT RUPTURE (HCC): Primary | ICD-10-CM

## 2020-03-11 PROCEDURE — 99213 OFFICE O/P EST LOW 20 MIN: CPT | Performed by: SURGERY

## 2020-03-11 NOTE — PROGRESS NOTES
Assessment/Plan:    AAA (abdominal aortic aneurysm) (HCC)    Slight interval enlargement of the AAA seen on the CT  noncontrast measurements from radiologist however on my measurement it appears to be the similar size compared to January  I would obtain a CT angiogram at six-month follow-up to see if there is any type 2 endoleak  It looks like the proximal portion of the stent is well apposed  Diagnoses and all orders for this visit:    Abdominal aortic aneurysm (AAA) without rupture (HCC)          Subjective:      Patient ID: Hiram Pickard is a 69 y o  male  HPI    Patient is here to review CT scan that was performed in February 2020  Patient underwent EVAR in 2017  He had reintervention for iliac stent stenosis in 2018  Currently denies any abdominal or back pain or flank pain or leg pain with walking  He is a current smoker  The following portions of the patient's history were reviewed and updated as appropriate: allergies, current medications, past family history, past medical history, past social history, past surgical history and problem list     Review of Systems   Constitutional: Negative  HENT: Negative  Eyes: Negative  Respiratory: Negative  Cardiovascular: Negative  Gastrointestinal: Negative  Endocrine: Negative  Genitourinary: Negative  Musculoskeletal: Negative  Skin: Negative  Allergic/Immunologic: Negative  Neurological: Negative  Hematological: Negative  Psychiatric/Behavioral: Negative  I have reviewed the review of systems as entered and made appropriate changes as necessary    Objective:      /80 (BP Location: Right arm, Patient Position: Sitting)   Pulse 64   Temp 98 °F (36 7 °C) (Tympanic)   Ht 5' 9" (1 753 m)   Wt 75 3 kg (166 lb)   BMI 24 51 kg/m²          Physical Exam   Constitutional: He is oriented to person, place, and time  He appears well-developed and well-nourished     HENT:   Head: Normocephalic and atraumatic  Eyes: Conjunctivae are normal    Cardiovascular: Normal rate and regular rhythm  Pulmonary/Chest: Effort normal    Abdominal: Soft  Musculoskeletal: Normal range of motion  He exhibits no edema, tenderness or deformity  Neurological: He is alert and oriented to person, place, and time  Skin: Skin is warm and dry  Nursing note and vitals reviewed

## 2020-03-11 NOTE — ASSESSMENT & PLAN NOTE
Slight interval enlargement of the AAA seen on the CT  noncontrast measurements from radiologist however on my measurement it appears to be the similar size compared to January  I would obtain a CT angiogram at six-month follow-up to see if there is any type 2 endoleak  It looks like the proximal portion of the stent is well apposed

## 2020-06-15 DIAGNOSIS — N32.81 OAB (OVERACTIVE BLADDER): ICD-10-CM

## 2020-06-15 DIAGNOSIS — R35.0 URINARY FREQUENCY: ICD-10-CM

## 2020-06-16 RX ORDER — SOLIFENACIN SUCCINATE 10 MG/1
TABLET, FILM COATED ORAL
Qty: 30 TABLET | Refills: 6 | Status: SHIPPED | OUTPATIENT
Start: 2020-06-16 | End: 2020-06-17 | Stop reason: SDUPTHER

## 2020-06-17 RX ORDER — SOLIFENACIN SUCCINATE 10 MG/1
10 TABLET, FILM COATED ORAL DAILY
Qty: 90 TABLET | Refills: 3 | Status: SHIPPED | OUTPATIENT
Start: 2020-06-17

## 2020-07-10 ENCOUNTER — APPOINTMENT (EMERGENCY)
Dept: CT IMAGING | Facility: HOSPITAL | Age: 75
End: 2020-07-10
Payer: MEDICARE

## 2020-07-10 ENCOUNTER — HOSPITAL ENCOUNTER (EMERGENCY)
Facility: HOSPITAL | Age: 75
Discharge: HOME | End: 2020-07-10
Attending: EMERGENCY MEDICINE | Admitting: EMERGENCY MEDICINE
Payer: MEDICARE

## 2020-07-10 ENCOUNTER — APPOINTMENT (EMERGENCY)
Dept: RADIOLOGY | Facility: HOSPITAL | Age: 75
End: 2020-07-10
Payer: MEDICARE

## 2020-07-10 VITALS
SYSTOLIC BLOOD PRESSURE: 134 MMHG | HEIGHT: 69 IN | BODY MASS INDEX: 24.49 KG/M2 | OXYGEN SATURATION: 98 % | TEMPERATURE: 98.9 F | RESPIRATION RATE: 17 BRPM | HEART RATE: 78 BPM | WEIGHT: 165.34 LBS | DIASTOLIC BLOOD PRESSURE: 73 MMHG

## 2020-07-10 DIAGNOSIS — G51.0 BELL'S PALSY: Primary | ICD-10-CM

## 2020-07-10 LAB
ALBUMIN SERPL BCP-MCNC: 3.3 G/DL (ref 3.5–5)
ALP SERPL-CCNC: 105 U/L (ref 46–116)
ALT SERPL W P-5'-P-CCNC: 16 U/L (ref 12–78)
ANION GAP SERPL CALCULATED.3IONS-SCNC: 5 MMOL/L (ref 4–13)
APTT PPP: 25 SECONDS (ref 23–37)
AST SERPL W P-5'-P-CCNC: 11 U/L (ref 5–45)
BASOPHILS # BLD AUTO: 0.02 THOUSANDS/ΜL (ref 0–0.1)
BASOPHILS NFR BLD AUTO: 0 % (ref 0–1)
BILIRUB SERPL-MCNC: 0.39 MG/DL (ref 0.2–1)
BUN SERPL-MCNC: 23 MG/DL (ref 5–25)
CALCIUM SERPL-MCNC: 9.2 MG/DL (ref 8.3–10.1)
CHLORIDE SERPL-SCNC: 103 MMOL/L (ref 100–108)
CO2 SERPL-SCNC: 33 MMOL/L (ref 21–32)
CREAT SERPL-MCNC: 1.08 MG/DL (ref 0.6–1.3)
EOSINOPHIL # BLD AUTO: 0.08 THOUSAND/ΜL (ref 0–0.61)
EOSINOPHIL NFR BLD AUTO: 1 % (ref 0–6)
ERYTHROCYTE [DISTWIDTH] IN BLOOD BY AUTOMATED COUNT: 12.2 % (ref 11.6–15.1)
GFR SERPL CREATININE-BSD FRML MDRD: 68 ML/MIN/1.73SQ M
GLUCOSE SERPL-MCNC: 117 MG/DL (ref 65–140)
GLUCOSE SERPL-MCNC: 121 MG/DL (ref 65–140)
HCT VFR BLD AUTO: 47.8 % (ref 36.5–49.3)
HGB BLD-MCNC: 15.8 G/DL (ref 12–17)
IMM GRANULOCYTES # BLD AUTO: 0.07 THOUSAND/UL (ref 0–0.2)
IMM GRANULOCYTES NFR BLD AUTO: 1 % (ref 0–2)
INR PPP: 1.04 (ref 0.84–1.19)
LYMPHOCYTES # BLD AUTO: 1.01 THOUSANDS/ΜL (ref 0.6–4.47)
LYMPHOCYTES NFR BLD AUTO: 11 % (ref 14–44)
MCH RBC QN AUTO: 30.7 PG (ref 26.8–34.3)
MCHC RBC AUTO-ENTMCNC: 33.1 G/DL (ref 31.4–37.4)
MCV RBC AUTO: 93 FL (ref 82–98)
MONOCYTES # BLD AUTO: 0.74 THOUSAND/ΜL (ref 0.17–1.22)
MONOCYTES NFR BLD AUTO: 8 % (ref 4–12)
NEUTROPHILS # BLD AUTO: 7.14 THOUSANDS/ΜL (ref 1.85–7.62)
NEUTS SEG NFR BLD AUTO: 79 % (ref 43–75)
NRBC BLD AUTO-RTO: 0 /100 WBCS
PLATELET # BLD AUTO: 218 THOUSANDS/UL (ref 149–390)
PMV BLD AUTO: 8.8 FL (ref 8.9–12.7)
POTASSIUM SERPL-SCNC: 3.9 MMOL/L (ref 3.5–5.3)
PROT SERPL-MCNC: 7.2 G/DL (ref 6.4–8.2)
PROTHROMBIN TIME: 13 SECONDS (ref 11.6–14.5)
RBC # BLD AUTO: 5.14 MILLION/UL (ref 3.88–5.62)
SODIUM SERPL-SCNC: 141 MMOL/L (ref 136–145)
TROPONIN I SERPL-MCNC: <0.02 NG/ML
WBC # BLD AUTO: 9.06 THOUSAND/UL (ref 4.31–10.16)

## 2020-07-10 PROCEDURE — 80053 COMPREHEN METABOLIC PANEL: CPT | Performed by: EMERGENCY MEDICINE

## 2020-07-10 PROCEDURE — 99285 EMERGENCY DEPT VISIT HI MDM: CPT

## 2020-07-10 PROCEDURE — 93005 ELECTROCARDIOGRAM TRACING: CPT

## 2020-07-10 PROCEDURE — 85610 PROTHROMBIN TIME: CPT | Performed by: EMERGENCY MEDICINE

## 2020-07-10 PROCEDURE — 85025 COMPLETE CBC W/AUTO DIFF WBC: CPT | Performed by: EMERGENCY MEDICINE

## 2020-07-10 PROCEDURE — 96374 THER/PROPH/DIAG INJ IV PUSH: CPT

## 2020-07-10 PROCEDURE — 84484 ASSAY OF TROPONIN QUANT: CPT | Performed by: EMERGENCY MEDICINE

## 2020-07-10 PROCEDURE — 71045 X-RAY EXAM CHEST 1 VIEW: CPT

## 2020-07-10 PROCEDURE — 70450 CT HEAD/BRAIN W/O DYE: CPT

## 2020-07-10 PROCEDURE — 85730 THROMBOPLASTIN TIME PARTIAL: CPT | Performed by: EMERGENCY MEDICINE

## 2020-07-10 PROCEDURE — 82948 REAGENT STRIP/BLOOD GLUCOSE: CPT

## 2020-07-10 PROCEDURE — 87476 LYME DIS DNA AMP PROBE: CPT | Performed by: EMERGENCY MEDICINE

## 2020-07-10 PROCEDURE — 99285 EMERGENCY DEPT VISIT HI MDM: CPT | Performed by: EMERGENCY MEDICINE

## 2020-07-10 PROCEDURE — 36415 COLL VENOUS BLD VENIPUNCTURE: CPT

## 2020-07-10 RX ORDER — POLYVINYL ALCOHOL 14 MG/ML
1 SOLUTION/ DROPS OPHTHALMIC AS NEEDED
Qty: 15 ML | Refills: 0 | Status: SHIPPED | OUTPATIENT
Start: 2020-07-10

## 2020-07-10 RX ORDER — ASPIRIN 81 MG/1
324 TABLET, CHEWABLE ORAL ONCE
Status: DISCONTINUED | OUTPATIENT
Start: 2020-07-10 | End: 2020-07-10

## 2020-07-10 RX ORDER — POLYVINYL ALCOHOL 14 MG/ML
1 SOLUTION/ DROPS OPHTHALMIC ONCE
Status: DISCONTINUED | OUTPATIENT
Start: 2020-07-10 | End: 2020-07-10 | Stop reason: CLARIF

## 2020-07-10 RX ORDER — METHYLPREDNISOLONE SODIUM SUCCINATE 125 MG/2ML
125 INJECTION, POWDER, LYOPHILIZED, FOR SOLUTION INTRAMUSCULAR; INTRAVENOUS ONCE
Status: COMPLETED | OUTPATIENT
Start: 2020-07-10 | End: 2020-07-10

## 2020-07-10 RX ORDER — PREDNISONE 20 MG/1
TABLET ORAL
Qty: 36 TABLET | Refills: 0 | Status: SHIPPED | OUTPATIENT
Start: 2020-07-11 | End: 2020-09-02 | Stop reason: ALTCHOICE

## 2020-07-10 RX ADMIN — GLYCERIN 1 DROP: .002; .002; .01 SOLUTION/ DROPS OPHTHALMIC at 22:01

## 2020-07-10 RX ADMIN — METHYLPREDNISOLONE SODIUM SUCCINATE 125 MG: 125 INJECTION, POWDER, FOR SOLUTION INTRAMUSCULAR; INTRAVENOUS at 19:55

## 2020-07-10 NOTE — ED NOTES
Per dr Brandon Townsend, patient is NOT a stroke alert at this time        Arie Nava, TEREZA  07/1945

## 2020-07-10 NOTE — ED PROVIDER NOTES
History  Chief Complaint   Patient presents with    Facial Droop     Noted facial drooping and eye twitching since 1400 today  A dditional c/o intermittent chest tightness and pressure x2 days w/ accompanying SOB w/ inhalation  69 y o  Male presents with the chief complaint of right sided facial droop starting today  He reports his wife first noticed it at 2 pm this afternoon  No similar symptoms in the past   Patient is unable to move his right eyebrow up  No other weakness, no other sensory loss  Patient reported to the nurse during triage that he also had a couple of episodes of sharp left sided chest pain 2 days ago which he believed was heartburn and which improved with heartburn medication  He reports he had a mild back pain earlier today and some body aches  He states overall he doesn't feel well  He states he thinks some of his myalgias may be related to a sunburn he received while working on his pool  No recent wooded exposures  He denies tick bites  He denies cold sores  History provided by:  Patient   used: No    Medical Problem   Location:  Right side of face  Quality:  Facial droop  Severity:  Severe  Onset quality:  Unable to specify  Duration:  6 hours  Timing:  Constant  Progression:  Unchanged  Chronicity:  New  Context:  Spontaneous  Relieved by:  Nothing  Worsened by:  Nothing  Ineffective treatments:  Nothing tried  Associated symptoms: chest pain    Associated symptoms: no diarrhea, no fever, no headaches, no nausea, no rash, no shortness of breath and no vomiting    Chest pain:     Quality: sharp      Severity:  Mild    Onset quality:  Gradual    Duration:  2 days    Timing:  Intermittent    Progression:  Resolved    Chronicity:  New      Prior to Admission Medications   Prescriptions Last Dose Informant Patient Reported? Taking?    JANUMET XR  MG TB24  Self Yes No   Sig: daily    acetaminophen (TYLENOL) 325 mg tablet  Self No No   Sig: Take 1 tablet every 6 hr as needed for pain  aspirin 81 mg chewable tablet  Self Yes No   Sig: Chew 81 mg daily     atorvastatin (LIPITOR) 40 mg tablet  Self Yes No   Sig: Take 40 mg by mouth daily     citalopram (CeleXA) 40 mg tablet  Self Yes No   hydrochlorothiazide (HYDRODIURIL) 25 mg tablet  Self Yes No   Sig: Take by mouth daily     lisinopril (ZESTRIL) 10 mg tablet  Self Yes No   Sig: Lisinopril 10 MG Oral Tablet  TAKE 1 TABLET DAILY AS DIRECTED  Quantity: 30;  Refills: 3       Garry Gomez PAC;  Started 12-Nov-2015  Active   solifenacin (VESICARE) 10 MG tablet   No No   Sig: Take 1 tablet (10 mg total) by mouth daily      Facility-Administered Medications: None       Past Medical History:   Diagnosis Date    AAA [facility]abdominal aortic aneurysm) ([facility])     Diabetes mellitus ([address] )     Diverticulosis     GERD (gastroesophageal reflux disease)     Hyperlipidemia     Hypertension     Sleep apnea     NOT DIAGNOSED       Past Surgical History:   Procedure Laterality Date    AORTIC VALVE REPLACEMENT      APPENDECTOMY      CARDIAC SURGERY      CORONARY ARTERY BYPASS GRAFT      LAPAROSCOPIC COLON RESECTION      LEG SURGERY      [facility] REMOVED FROM LOWER EXTREMITIES    [place] AAA REPAIR,AORTO-AORTIC TUBE PROSTH N/A 1/6/2017    Procedure: REPAIR ANEURYSM ENDOVASCULAR ABDOMINAL AORTIC  (EVAR); Surgeon: Baron Raul MD;  Location: [facility];  Service: Vascular    [place] [facility] 3RD+ ORD [facility]V [facility] N/A 1/26/2018    Procedure: ANGIOGRAM; BILATERAL ILIAC ARTERY BALLOONING;  Surgeon: Baron Raul MD;  Location: [facility];  Service: Vascular    VALVE REPLACEMENT         History reviewed  No pertinent family history  I have reviewed and agree with the history as documented      E-Cigarette/Vaping     E-Cigarette/Vaping Substances     Social History     Tobacco Use    Smoking status: Current Every Day Smoker     Packs/day: 0 50     Years: 30 00     Pack years: 15 00    Smokeless tobacco: Never Used   Substance Use Topics    Alcohol use: Yes     Comment: SOCIAL    Drug use: No       Review of Systems   Constitutional: Negative for chills, diaphoresis and fever  Respiratory: Negative for shortness of breath  Cardiovascular: Positive for chest pain  Negative for palpitations  Gastrointestinal: Negative for diarrhea, nausea and vomiting  Genitourinary: Negative for dysuria and frequency  Skin: Negative for rash  Neurological: Positive for facial asymmetry  Negative for headaches  All other systems reviewed and are negative  Physical Exam  Physical Exam   Constitutional: He is oriented to person, place, and time  He appears well-developed and well-nourished  He appears distressed  HENT:   Head: Normocephalic and atraumatic  Eyes: Pupils are equal, round, and reactive to light  EOM are normal    Neck: Normal range of motion  No JVD present  Cardiovascular: Normal rate, regular rhythm, normal heart sounds and intact distal pulses  Exam reveals no gallop and no friction rub  No murmur heard  Pulmonary/Chest: Effort normal and breath sounds normal  No respiratory distress  He has no wheezes  He has no rales  He exhibits no tenderness  Musculoskeletal: Normal range of motion  He exhibits no tenderness  Neurological: He is alert and oriented to person, place, and time  He has normal strength  Coordination normal  GCS eye subscore is 4  GCS verbal subscore is 5  GCS motor subscore is 6  Right sided facial droop not sparing the forehead  Consistent    Skin: Skin is warm and dry  Psychiatric: He has a normal mood and affect  His behavior is normal  Judgment and thought content normal    Nursing note and vitals reviewed        Vital Signs  ED Triage Vitals   Temperature Pulse Respirations Blood Pressure SpO2   07/10/20 1941 07/10/20 1933 07/10/20 1933 07/10/20 1933 07/10/20 1933   98 9 °F (37 2 °C) 88 20 (!) 176/90 99 %      Temp Source Heart Rate Source Patient Position - Orthostatic VS BP Location FiO2 (%)   07/10/20 1941 07/10/20 1933 07/10/20 1933 07/10/20 1933 --   Oral Monitor Sitting Left arm       Pain Score       07/10/20 1933       No Pain           Vitals:    07/10/20 1939 07/10/20 1945 07/10/20 2042 07/10/20 2139   BP: (!) 172/86 (!) 172/86 156/68 134/73   Pulse: 80 76 70 78   Patient Position - Orthostatic VS: Sitting Lying Sitting Sitting         Visual Acuity      ED Medications  Medications   polyvinyl alcohol (LIQUIFILM TEARS) 1 4 % ophthalmic solution 1 drop (has no administration in time range)   methylPREDNISolone sodium succinate (Solu-MEDROL) injection 125 mg (125 mg Intravenous Given 7/10/20 1955)       Diagnostic Studies  Results Reviewed     Procedure Component Value Units Date/Time    Lyme disease, PCR [[id number]] Collected:  07/10/20 1954    Lab Status:   In process Specimen:  Blood from Arm, Right Updated:  07/10/20 2002    Troponin I [[id number]]  (Normal) Collected:  07/10/20 1937    Lab Status:  Final result Specimen:  Blood from Arm, Left Updated:  07/10/20 2000     Troponin I <0 02 ng/mL     Comprehensive metabolic panel [[id number]]  (Abnormal) Collected:  07/10/20 1937    Lab Status:  Final result Specimen:  Blood from Arm, Left Updated:  07/10/20 1959     Sodium 141 mmol/L      Potassium 3 9 mmol/L      Chloride 103 mmol/L      CO2 33 mmol/L      ANION GAP 5 mmol/L      BUN 23 mg/dL      Creatinine 1 08 mg/dL      Glucose 117 mg/dL      Calcium 9 2 mg/dL      AST 11 U/L      ALT 16 U/L      Alkaline Phosphatase 105 U/L      Total Protein 7 2 g/dL      Albumin 3 3 g/dL      Total Bilirubin 0 39 mg/dL      eGFR 68 ml/min/1 73sq m     Narrative:       [place] guidelines for Chronic Kidney Disease (CKD):     Stage 1 with normal or high GFR (GFR > 90 mL/min/1 73 square meters)    Stage 2 Mild CKD (GFR = 60-89 mL/min/1 73 square meters)    Stage 3A Moderate CKD (GFR = 45-59 mL/min/1 73 square meters)    Stage 3B Moderate CKD (GFR = 30-44 mL/min/1 73 square meters)    Stage 4 Severe CKD (GFR = 15-29 mL/min/1 73 square meters)    Stage 5 End Stage CKD (GFR <15 mL/min/1 73 square meters)  Note: GFR calculation is accurate only with a steady state creatinine    Protime-INR [[id number]]  (Normal) Collected:  07/10/20 1937    Lab Status:  Final result Specimen:  Blood from Arm, Left Updated:  07/10/20 1954     Protime 13 0 seconds      INR 1 04    APTT [[id number]]  (Normal) Collected:  07/10/20 1937    Lab Status:  Final result Specimen:  Blood from Arm, Left Updated:  07/10/20 1954     PTT 25 seconds     CBC and differential [[id number]]  (Abnormal) Collected:  07/10/20 1937    Lab Status:  Final result Specimen:  Blood from Arm, Left Updated:  07/10/20 1944     WBC 9 06 Thousand/uL      RBC 5 14 Million/uL      Hemoglobin 15 8 g/dL      Hematocrit 47 8 %      MCV 93 fL      MCH 30 7 pg      MCHC 33 1 g/dL      RDW 12 2 %      MPV 8 8 fL      Platelets 433 Thousands/uL      nRBC 0 /100 WBCs      Neutrophils Relative 79 %      Immat GRANS % 1 %      Lymphocytes Relative 11 %      Monocytes Relative 8 %      Eosinophils Relative 1 %      Basophils Relative 0 %      Neutrophils Absolute 7 14 Thousands/µL      Immature Grans Absolute 0 07 Thousand/uL      Lymphocytes Absolute 1 01 Thousands/µL      Monocytes Absolute 0 74 Thousand/µL      Eosinophils Absolute 0 08 Thousand/µL      Basophils Absolute 0 02 Thousands/µL     Fingerstick Glucose (POCT) [[id number]]  (Normal) Collected:  07/10/20 1940    Lab Status:  Final result Updated:  07/10/20 1941     POC Glucose 121 mg/dl                  CT head without contrast   Final Result by Ella Cowart MD (07/10 2139)      No acute intracranial hemorrhage, midline shift, or mass effect  Workstation performed: [id number]         XR chest 1 view portable   ED Interpretation by Kathryn Candelario MD (07/10 2058)   This film was interpreted independently by me     No infiltrate, cardiac silhouette normal, no pleural effusions or pulmonary edema  Procedures  ECG 12 Lead Documentation Only  Date/Time: 7/10/2020 7:59 PM  Performed by: Susana Berg MD  Authorized by: Susana Berg MD     Indications / Diagnosis:  Chest pain 2 days ago  ECG reviewed by me, the ED Provider: yes    Patient location:  ED  Previous ECG:     Previous ECG:  Compared to current    Comparison ECG info:  01/22/2018    Similarity:  Changes noted  Interpretation:     Interpretation: non-specific    Rate:     ECG rate:  73    ECG rate assessment: normal    Rhythm:     Rhythm: sinus rhythm    Ectopy:     Ectopy: none    QRS:     QRS axis:  Normal    QRS intervals:  Normal  Conduction:     Conduction: abnormal      Abnormal conduction: complete RBBB    ST segments:     ST segments:  Normal  T waves:     T waves: normal               ED Course       US AUDIT      Most Recent Value   Initial Alcohol Screen: US AUDIT-C    1  How often do you have a drink containing alcohol?  0 Filed at: 07/10/2020 1945   2  How many drinks containing alcohol do you have on a typical day you are drinking? 0 Filed at: 07/10/2020 1945   3a  Male UNDER 65: How often do you have five or more drinks on one occasion? 0 Filed at: 07/10/2020 1945   3b  FEMALE Any Age, or MALE 65+: How often do you have 4 or more drinks on one occassion? 0 Filed at: 07/10/2020 1945   Audit-C Score  0 Filed at: 07/10/2020 1945            HEART Risk Score      Most Recent Value   Heart Score Risk Calculator   History  0 Filed at: 07/10/2020 1951   ECG  0 Filed at: 07/10/2020 1951   Age  2 Filed at: 07/10/2020 1951   Risk Factors  2 Filed at: 07/10/2020 1951   Troponin  0 Filed at: 07/10/2020 1951   HEART Score  4 Filed at: 07/10/2020 1951            DAMION/DAST-10      Most Recent Value   How many times in the past year have you    Used an illegal drug or used a prescription medication for non-medical reasons?   Never Filed at: 07/10/2020 1935 MDM  Number of Diagnoses or Management Options  Bell's palsy: new and requires workup  Diagnosis management comments: Background: 69 y o  male presents with right sided facial droop, left sided chest pain    Differential DX includes but is not limited to: bell's palsy - possibly due to lyme, doubt hsv, doubt CVA; atypical acs, pleurisy, muscular chest pain    Plan: cardiac workup, ct head, iv steroids, likely discharge with steroids         Amount and/or Complexity of Data Reviewed  Clinical lab tests: ordered and reviewed  Tests in the radiology section of CPT®: ordered and reviewed  Independent visualization of images, tracings, or specimens: yes    Risk of Complications, Morbidity, and/or Mortality  Presenting problems: high  Diagnostic procedures: high  Management options: high    Patient Progress  Patient progress: stable        Disposition  Final diagnoses:   Bell's palsy     Time reflects when diagnosis was documented in both MDM as applicable and the Disposition within this note     Time User Action Codes Description Comment    7/10/2020  9:44 PM Stephanie Morales [G51 0] Bell's palsy       ED Disposition     ED Disposition Condition Date/Time Comment    Discharge Stable Fri Jul 10, 2020  9:44 PM Tammy Cantu discharge to home/self care              Follow-up Information     Follow up With Specialties Details Why Caridad Wharton MD Internal Medicine Schedule an appointment as soon as possible for a visit in 1 week  [address] [address]  [phone]            Patient's Medications   Discharge Prescriptions    POLYVINYL ALCOHOL (LIQUIFILM TEARS) 1 4 % OPHTHALMIC SOLUTION    Administer 1 drop to the right eye as needed for dry eyes       Start Date: 7/10/2020 End Date: --       Order Dose: 1 drop       Quantity: 15 mL    Refills: 0    PREDNISONE 20 MG TABLET    60 mg po days 1-6, 40 mg po days 7-12, 20 mg po days 13-18       Start Date: 7/11/2020 End Date: --       Order Dose: --       Quantity: 36 tablet    Refills: 0     No discharge procedures on file      PDMP Review     None          ED Provider  Electronically Signed by           Laure Fagan MD  07/10/20 8630

## 2020-07-11 LAB
ATRIAL RATE: 76 BPM
ATRIAL RATE: 82 BPM
P AXIS: 49 DEGREES
P AXIS: 54 DEGREES
PR INTERVAL: 160 MS
PR INTERVAL: 166 MS
QRS AXIS: 103 DEGREES
QRS AXIS: 103 DEGREES
QRSD INTERVAL: 128 MS
QRSD INTERVAL: 136 MS
QT INTERVAL: 368 MS
QT INTERVAL: 384 MS
QTC INTERVAL: 422 MS
QTC INTERVAL: 424 MS
T WAVE AXIS: 35 DEGREES
T WAVE AXIS: 42 DEGREES
VENTRICULAR RATE: 73 BPM
VENTRICULAR RATE: 79 BPM

## 2020-07-11 PROCEDURE — 93010 ELECTROCARDIOGRAM REPORT: CPT | Performed by: INTERNAL MEDICINE

## 2020-07-11 NOTE — ED NOTES
Pt  A&Ox4  Steady, independent gait  Eye patch and artificial tears in hand  Warm, dry skin and unlabored respirations  ONE prescription called into pt  Preferred pharmacy  Pt  Denies any further needs at this time        Neo Kowalski RN  07/10/20 0962

## 2020-07-17 LAB — B BURGDOR DNA SPEC QL NAA+PROBE: NEGATIVE

## 2020-08-02 NOTE — PROGRESS NOTES
Cardiology Follow Up    Francisco Galicia  1945  [id number]  [facility]  [address]  [address]  [phone]  [phone]    1  Essential (primary) hypertension     2  Pure hypercholesterolemia     3  Coronary arteriosclerosis     4  S/P AVR         Interval History: Patient is here for a follow-up visit  Patient has a #23 Magna Ease bovine pericardial valve in place  Bio AVR and SVG to the RCA were performed November 2015  He is also followed by vascular surgery in reference to prior EVAR of an abdominal aneurysm  His most recent echocardiogram done August 2020 demonstrated preserved LV systolic function with mild LVH and an appropriately functioning bioprosthesis noted in the aortic position  There was trace aortic regurgitation  The peak continuous wave velocity across the aortic valve was 3 12 m/sec with a mean gradient of 23 mmHg  There was mild left atrial cavity enlargement and mild mitral regurgitation  There was no significant change compared to a prior study done July 2019  Lipid profile done December 2019 demonstrated total cholesterol of 104 with an HDL of 44 and a calculated LDL of 51  Patient has been well  He has had no chest pain or significant dyspnea  His vital signs are stable today      Patient Active Problem List   Diagnosis    AAA (abdominal aortic aneurysm) (HCC)    Urinary frequency    Diabetes mellitus ([address] )    Hypertension    Hyperlipidemia     Past Medical History:   Diagnosis Date    AAA (abdominal aortic aneurysm) (HCC)     Diabetes mellitus ([address] )     Diverticulosis     GERD (gastroesophageal reflux disease)     Hyperlipidemia     Hypertension     Sleep apnea     NOT DIAGNOSED     Social History     Socioeconomic History    Marital status: /Civil Union     Spouse name: Not on file    Number of children: Not on file    Years of education: Not on file   [employer] Highest education level: Not on file   Occupational History    Not on file   Social Needs    Financial resource strain: Not on file    Food insecurity     Worry: Not on file     Inability: Not on file    Transportation needs     Medical: Not on file     Non-medical: Not on file   Tobacco Use    Smoking status: Current Every Day Smoker     Packs/day: 0 50     Years: 30 00     Pack years: 15 00    Smokeless tobacco: Never Used   Substance and Sexual Activity    Alcohol use: Yes     Comment: SOCIAL    Drug use: No    Sexual activity: Never   Lifestyle    Physical activity     Days per week: Not on file     Minutes per session: Not on file    Stress: Not on file   Relationships    Social connections     Talks on phone: Not on file     Gets together: Not on file     Attends [employer] service: Not on file     Active member of club or organization: Not on file     Attends meetings of clubs or organizations: Not on file     Relationship status: Not on file    Intimate partner violence     Fear of current or ex partner: Not on file     Emotionally abused: Not on file     Physically abused: Not on file     Forced sexual activity: Not on file   Other Topics Concern    Not on file   Social History Narrative    Not on file      No family history on file  Past Surgical History:   Procedure Laterality Date    AORTIC VALVE REPLACEMENT      APPENDECTOMY      CARDIAC SURGERY      CORONARY ARTERY BYPASS GRAFT      LAPAROSCOPIC COLON RESECTION      LEG SURGERY      [facility] REMOVED FROM LOWER EXTREMITIES    [place] AAA REPAIR,AORTO-AORTIC TUBE PROSTH N/A 1/6/2017    Procedure: REPAIR ANEURYSM ENDOVASCULAR ABDOMINAL AORTIC  (EVAR);   Surgeon: Jacinta Mendiola MD;  Location: [facility];  Service: Vascular    [place] [facility] 3RD+ ORD SLCTV [facility] N/A 1/26/2018    Procedure: ANGIOGRAM; BILATERAL ILIAC ARTERY BALLOONING;  Surgeon: Jacinta Mendiola MD;  Location: [facility];  Service: Vascular    VALVE REPLACEMENT Current Outpatient Medications:     acetaminophen (TYLENOL) 325 mg tablet, Take 1 tablet every 6 hr as needed for pain , Disp: 30 tablet, Rfl: 0    aspirin 81 mg chewable tablet, Chew 81 mg daily  , Disp: , Rfl:     atorvastatin (LIPITOR) 40 mg tablet, Take 40 mg by mouth daily  , Disp: , Rfl:     citalopram (CeleXA) 40 mg tablet, Take 40 mg by mouth daily , Disp: , Rfl:     hydrochlorothiazide (HYDRODIURIL) 25 mg tablet, Take 25 mg by mouth daily , Disp: , Rfl:     JANUMET XR  MG TB24, Take 1 tablet by mouth daily , Disp: , Rfl:     lisinopril (ZESTRIL) 10 mg tablet, Lisinopril 10 MG Oral Tablet TAKE 1 TABLET DAILY AS DIRECTED  Quantity: 30;  Refills: 3    [facility];  Started 12-Nov-2015 Active, Disp: , Rfl:     polyvinyl alcohol (LIQUIFILM TEARS) 1 4 % ophthalmic solution, Administer 1 drop to the right eye as needed for dry eyes, Disp: 15 mL, Rfl: 0    solifenacin (VESICARE) 10 MG tablet, Take 1 tablet (10 mg total) by mouth daily, Disp: 90 tablet, Rfl: 3    predniSONE 20 mg tablet, 60 mg po days 1-6, 40 mg po days 7-12, 20 mg po days 13-18 (Patient not taking: Reported on 8/7/2020), Disp: 36 tablet, Rfl: 0  No Known Allergies    Labs:not applicable  Imaging: Xr Chest 1 View Portable    Result Date: 7/11/2020  Narrative: CHEST INDICATION:   cp  COMPARISON:  01/02/2017 EXAM PERFORMED/VIEWS:  XR CHEST PORTABLE 1 image FINDINGS: Cardiomediastinal silhouette appears unremarkable  A median sternotomy has been performed  The lungs are clear  No pneumothorax or pleural effusion  Osseous structures appear within normal limits for patient age  Suspected small foreign bodies on both sides of the lower neck unchanged  Impression: No acute cardiopulmonary disease   Workstation performed: [id number]     Ct Head Without Contrast    Result Date: 7/10/2020  Narrative: CT BRAIN - WITHOUT CONTRAST INDICATION:   Neuro deficit, acute, stroke suspected suspect bell's palsy but rule out central lesion  COMPARISON:  None  TECHNIQUE:  CT examination of the brain was performed  In addition to axial images, coronal 2D reformatted images were created and submitted for interpretation  Radiation dose length product (DLP) for this visit:  867 45 mGy-cm   This examination, like all CT scans performed in the [facility], was performed utilizing techniques to minimize radiation dose exposure, including the use of iterative  reconstruction and automated exposure control  IMAGE QUALITY:  Diagnostic  FINDINGS: PARENCHYMA:  No intracranial mass, mass effect or midline shift  No CT signs of acute infarction  No acute parenchymal hemorrhage  VENTRICLES AND EXTRA-AXIAL SPACES:  Normal for the patient's age  VISUALIZED ORBITS AND PARANASAL SINUSES:  Unremarkable  CALVARIUM AND EXTRACRANIAL SOFT TISSUES:  Normal      Impression: No acute intracranial hemorrhage, midline shift, or mass effect  Workstation performed: [id number]       Review of Systems:  Review of Systems   All other systems reviewed and are negative  Physical Exam:  /68 (BP Location: Right arm, Patient Position: Sitting, Cuff Size: Standard)   Pulse 60   Temp 97 8 °F (36 6 °C)   Ht 5' 9" (1 753 m)   Wt 73 9 kg (162 lb 14 4 oz)   BMI 24 06 kg/m²   Physical Exam   Constitutional: He is oriented to person, place, and time  He appears well-developed  HENT:   Head: Normocephalic and atraumatic  Eyes: Pupils are equal, round, and reactive to light  Conjunctivae are normal    Neck: Normal range of motion  Neck supple  Cardiovascular: Normal rate  Murmur heard  Crisp prosthetic heart sounds   Pulmonary/Chest: Effort normal and breath sounds normal    Neurological: He is alert and oriented to person, place, and time  Skin: Skin is warm and dry  Vitals reviewed  Discussion/Summary:I will continue the patient's present medical regimen  The patient appears well compensated    I have asked the patient to call if there is a problem in the interim otherwise I will see the patient in one years time  Patient is due for an echocardiogram prior to the next visit to assess LV wall thickness and systolic function

## 2020-08-03 ENCOUNTER — HOSPITAL ENCOUNTER (OUTPATIENT)
Dept: NON INVASIVE DIAGNOSTICS | Facility: CLINIC | Age: 75
Discharge: HOME | End: 2020-08-03
Payer: MEDICARE

## 2020-08-03 DIAGNOSIS — I10 ESSENTIAL (PRIMARY) HYPERTENSION: ICD-10-CM

## 2020-08-03 DIAGNOSIS — E78.00 PURE HYPERCHOLESTEROLEMIA: ICD-10-CM

## 2020-08-03 DIAGNOSIS — I25.10 CORONARY ARTERIOSCLEROSIS: ICD-10-CM

## 2020-08-03 DIAGNOSIS — Z95.2 S/P AVR: ICD-10-CM

## 2020-08-03 PROCEDURE — 93306 TTE W/DOPPLER COMPLETE: CPT

## 2020-08-03 PROCEDURE — 93306 TTE W/DOPPLER COMPLETE: CPT | Performed by: INTERNAL MEDICINE

## 2020-08-07 ENCOUNTER — OFFICE VISIT (OUTPATIENT)
Dept: CARDIOLOGY CLINIC | Facility: CLINIC | Age: 75
End: 2020-08-07
Payer: MEDICARE

## 2020-08-07 VITALS
BODY MASS INDEX: 24.13 KG/M2 | SYSTOLIC BLOOD PRESSURE: 142 MMHG | TEMPERATURE: 97.8 F | DIASTOLIC BLOOD PRESSURE: 68 MMHG | HEART RATE: 60 BPM | HEIGHT: 69 IN | WEIGHT: 162.9 LBS

## 2020-08-07 DIAGNOSIS — E78.00 PURE HYPERCHOLESTEROLEMIA: ICD-10-CM

## 2020-08-07 DIAGNOSIS — I25.10 CORONARY ARTERIOSCLEROSIS: ICD-10-CM

## 2020-08-07 DIAGNOSIS — Z95.2 S/P AVR: ICD-10-CM

## 2020-08-07 DIAGNOSIS — I10 ESSENTIAL (PRIMARY) HYPERTENSION: Primary | ICD-10-CM

## 2020-08-07 PROCEDURE — 99215 OFFICE O/P EST HI 40 MIN: CPT | Performed by: INTERNAL MEDICINE

## 2020-08-10 ENCOUNTER — HOSPITAL ENCOUNTER (OUTPATIENT)
Dept: CT IMAGING | Facility: HOSPITAL | Age: 75
Discharge: HOME | End: 2020-08-10
Attending: SURGERY
Payer: MEDICARE

## 2020-08-10 DIAGNOSIS — I71.4 ABDOMINAL AORTIC ANEURYSM (AAA) WITHOUT RUPTURE (HCC): ICD-10-CM

## 2020-08-10 PROCEDURE — 74174 CTA ABD&PLVS W/CONTRAST: CPT

## 2020-08-10 RX ADMIN — IOHEXOL 100 ML: 350 INJECTION, SOLUTION INTRAVENOUS at 13:25

## 2020-09-01 ENCOUNTER — TELEPHONE (OUTPATIENT)
Dept: VASCULAR SURGERY | Facility: CLINIC | Age: 75
End: 2020-09-01

## 2020-09-02 ENCOUNTER — OFFICE VISIT (OUTPATIENT)
Dept: VASCULAR SURGERY | Facility: CLINIC | Age: 75
End: 2020-09-02
Payer: MEDICARE

## 2020-09-02 VITALS
WEIGHT: 161 LBS | HEART RATE: 64 BPM | HEIGHT: 69 IN | BODY MASS INDEX: 23.85 KG/M2 | SYSTOLIC BLOOD PRESSURE: 156 MMHG | TEMPERATURE: 98.2 F | DIASTOLIC BLOOD PRESSURE: 80 MMHG

## 2020-09-02 DIAGNOSIS — I71.4 ABDOMINAL AORTIC ANEURYSM (AAA) WITHOUT RUPTURE (HCC): Primary | ICD-10-CM

## 2020-09-02 PROCEDURE — 99213 OFFICE O/P EST LOW 20 MIN: CPT | Performed by: SURGERY

## 2020-09-02 NOTE — ASSESSMENT & PLAN NOTE
Reviewed CTA  On my measurements it appears to be stale at 5 6 cm  I think there is some over estimation on the radiology read  No evidence of any endoleak and graft is in good position  So I recommend EVAR duplex in 6 months for follow up

## 2020-09-02 NOTE — PROGRESS NOTES
Assessment/Plan:    AAA (abdominal aortic aneurysm) ([address] )  Reviewed CTA  On my measurements it appears to be stale at 5 6 cm  I think there is some over estimation on the radiology read  No evidence of any endoleak and graft is in good position  So I recommend EVAR duplex in 6 months for follow up  Diagnoses and all orders for this visit:    Abdominal aortic aneurysm (AAA) without rupture (HCC)  -     VAS evar endovascular aortic repair duplex; Future          Subjective:      Patient ID: Sarath Galo is a 69 y o  male  Patient presents today to review CTA abdomen/pelvis s/p EVAR on 7/31/19  HPI  Doing well  No abdominal or back pain  Occasional RLQ pain due to diverticulosis  The following portions of the patient's history were reviewed and updated as appropriate: allergies, current medications, past family history, past medical history, past social history, past surgical history and problem list     Review of Systems   Constitutional: Negative  HENT: Negative  Eyes: Negative  Respiratory: Negative  Cardiovascular: Negative  Gastrointestinal: Negative  Endocrine: Negative  Genitourinary: Negative  Musculoskeletal: Negative  Skin: Negative  Allergic/Immunologic: Negative  Neurological: Negative  Hematological: Negative  Psychiatric/Behavioral: Negative  I have reviewed the ROS as entered and made changes as necessary  Objective:      /80 (BP Location: Right arm, Patient Position: Sitting)   Pulse 64   Temp 98 2 °F (36 8 °C) (Tympanic)   Ht 5' 9" (1 753 m)   Wt 73 kg (161 lb)   BMI 23 78 kg/m²          Physical Exam  Vitals signs and nursing note reviewed  Constitutional:       General: He is not in acute distress  Appearance: Normal appearance  He is normal weight  He is not ill-appearing, toxic-appearing or diaphoretic  HENT:      Head: Normocephalic and atraumatic     Cardiovascular:      Rate and Rhythm: Normal rate and regular rhythm  Abdominal:      Palpations: Abdomen is soft  Skin:     General: Skin is warm  Capillary Refill: Capillary refill takes less than 2 seconds  Neurological:      General: No focal deficit present  Mental Status: He is alert and oriented to person, place, and time

## 2021-02-08 ENCOUNTER — HOSPITAL ENCOUNTER (OUTPATIENT)
Dept: NON INVASIVE DIAGNOSTICS | Facility: CLINIC | Age: 76
Discharge: HOME | End: 2021-02-08
Payer: MEDICARE

## 2021-02-08 DIAGNOSIS — I10 ESSENTIAL (PRIMARY) HYPERTENSION: ICD-10-CM

## 2021-02-08 DIAGNOSIS — E78.00 PURE HYPERCHOLESTEROLEMIA: ICD-10-CM

## 2021-02-08 DIAGNOSIS — I25.10 CORONARY ARTERIOSCLEROSIS: ICD-10-CM

## 2021-02-08 DIAGNOSIS — Z95.2 S/P AVR: ICD-10-CM

## 2021-02-08 PROCEDURE — 93306 TTE W/DOPPLER COMPLETE: CPT

## 2021-02-08 PROCEDURE — 93306 TTE W/DOPPLER COMPLETE: CPT | Performed by: INTERNAL MEDICINE

## 2021-03-01 ENCOUNTER — TELEPHONE (OUTPATIENT)
Dept: CARDIOLOGY CLINIC | Facility: CLINIC | Age: 76
End: 2021-03-01

## 2021-03-02 DIAGNOSIS — I25.10 CORONARY ARTERIOSCLEROSIS: Primary | ICD-10-CM

## 2021-03-03 ENCOUNTER — HOSPITAL ENCOUNTER (OUTPATIENT)
Dept: NON INVASIVE DIAGNOSTICS | Facility: CLINIC | Age: 76
Discharge: HOME | End: 2021-03-03
Payer: MEDICARE

## 2021-03-03 DIAGNOSIS — I71.4 ABDOMINAL AORTIC ANEURYSM (AAA) WITHOUT RUPTURE (HCC): ICD-10-CM

## 2021-03-03 PROCEDURE — 93978 VASCULAR STUDY: CPT

## 2021-03-03 PROCEDURE — 93978 VASCULAR STUDY: CPT | Performed by: SURGERY

## 2021-03-03 PROCEDURE — 93923 UPR/LXTR ART STDY 3+ LVLS: CPT

## 2021-07-07 ENCOUNTER — HOSPITAL ENCOUNTER (OUTPATIENT)
Dept: VASCULAR ULTRASOUND | Facility: HOSPITAL | Age: 76
Discharge: HOME | End: 2021-07-07
Attending: INTERNAL MEDICINE
Payer: MEDICARE

## 2021-07-07 ENCOUNTER — APPOINTMENT (OUTPATIENT)
Dept: LAB | Facility: CLINIC | Age: 76
End: 2021-07-07
Payer: MEDICARE

## 2021-07-07 DIAGNOSIS — I25.10 CORONARY ARTERIOSCLEROSIS: ICD-10-CM

## 2021-07-07 DIAGNOSIS — Z20.822 ENCOUNTER FOR SCREENING LABORATORY TESTING FOR SEVERE ACUTE RESPIRATORY SYNDROME CORONAVIRUS 2 (SARS-COV-2): ICD-10-CM

## 2021-07-07 DIAGNOSIS — Z11.52 ENCOUNTER FOR SCREENING FOR SEVERE ACUTE RESPIRATORY SYNDROME CORONAVIRUS 2 (SARS-COV-2) INFECTION: ICD-10-CM

## 2021-07-07 LAB
SARS-COV-2 IGG SERPL QL IA: REACTIVE
SARS-COV-2 IGG+IGM SERPL QL IA: REACTIVE

## 2021-07-07 PROCEDURE — 36415 COLL VENOUS BLD VENIPUNCTURE: CPT

## 2021-07-07 PROCEDURE — 93880 EXTRACRANIAL BILAT STUDY: CPT

## 2021-07-07 PROCEDURE — 86769 SARS-COV-2 COVID-19 ANTIBODY: CPT

## 2021-07-09 PROCEDURE — 93880 EXTRACRANIAL BILAT STUDY: CPT | Performed by: SURGERY

## 2021-08-20 NOTE — PROGRESS NOTES
Cardiology Follow Up    Leonard Cazares  1945  [id number]  [facility]  [address]  [address]  [phone]    1  Essential (primary) hypertension     2  Pure hypercholesterolemia     3  Coronary arteriosclerosis     4  S/P AVR         Interval History: Patient is here for a follow-up visit  Sahra Kessler has a #23 [place]agna Ease bovine pericardial aortic valve in place  Bio AVR and SVG to the RCA were performed November 2015  Ramila Gonzalez is followed by vascular surgery in reference to prior EVAR of an AAA  Echocardiogram done February 2021 demonstrated preserved LV systolic function with mild LVH  A bioprosthesis was noted in the aortic position with mild to moderate AS  The peak velocity across the prosthesis was 3 44 m/sec with a mean gradient of 27 mmHg  This was increased compared to a prior study done August 2020  Patient has been well  He has had no chest pain or significant dyspnea  He smokes cigarettes  He had stopped previously for five years  He is going through a process of trying to discontinue again      Patient Active Problem List   Diagnosis    AAA (abdominal aortic aneurysm) (HCC)    Urinary frequency    Diabetes mellitus ([address] )    Hypertension    Hyperlipidemia     Past Medical History:   Diagnosis Date    AAA (abdominal aortic aneurysm) (HCC)     Diabetes mellitus ([facility] [address] [facility]     Diverticulosis     GERD (gastroesophageal reflux disease)     Hyperlipidemia     Hypertension     Sleep apnea     NOT DIAGNOSED     Social History     Socioeconomic History    Marital status: /Civil Union     Spouse name: Not on file    Number of children: Not on file    Years of education: Not on file    Highest education level: Not on file   Occupational History    Not on file   Tobacco Use    Smoking status: Current Every Day Smoker     Packs/day: 1 00     Years: 30 00     Pack years: 30 00    Smokeless tobacco: Never Used   Vaping Use    Vaping Use: Never used   Substance and Sexual Activity    Alcohol use: Yes     Comment: SOCIAL    Drug use: No    Sexual activity: Never   Other Topics Concern    Not on file   Social History Narrative    Not on file     Social Determinants of Health     Financial Resource Strain:     Difficulty of Paying Living Expenses:    Food Insecurity:     Worried About Running Out of Food in the Last Year:     [address] in the Last Year:    Transportation Needs:     Lack of Transportation (Medical):  Lack of Transportation (Non-Medical):    Physical Activity:     Days of Exercise per Week:     Minutes of Exercise per Session:    Stress:     Feeling of Stress :    Social Connections:     Frequency of Communication with Friends and Family:     Frequency of Social Gatherings with Friends and Family:     Attends Christian Services:     Active Member of Clubs or Organizations:     Attends Club or Organization Meetings:     Marital Status:    Intimate Partner Violence:     Fear of Current or Ex-Partner:     Emotionally Abused:     Physically Abused:     Sexually Abused:       No family history on file  Past Surgical History:   Procedure Laterality Date    AORTIC VALVE REPLACEMENT      APPENDECTOMY      CARDIAC SURGERY      CORONARY ARTERY BYPASS GRAFT      LAPAROSCOPIC COLON RESECTION      LEG SURGERY      [facility] REMOVED FROM LOWER EXTREMITIES    [place] AAA REPAIR,AORTO-AORTIC TUBE PROSTH N/A 1/6/2017    Procedure: REPAIR ANEURYSM ENDOVASCULAR ABDOMINAL AORTIC  (EVAR);   Surgeon: Sukhdev Richardson MD;  [facility];  Service: Vascular    [place] Svetlana Dodge 3RD+ ORD SLCTV ABDL PEL/LXTR [facility] N/A 1/26/2018    Procedure: ANGIOGRAM; BILATERAL ILIAC ARTERY BALLOONING;  Surgeon: Sukhdev Richardson MD;  Location[facility];  Service: Vascular    VALVE REPLACEMENT         Current Outpatient Medications:     acetaminophen (TYLENOL) 325 mg tablet, Take 1 tablet every 6 hr as needed for pain , Disp: 30 tablet, Rfl: 0    aspirin 81 mg chewable tablet, Chew 81 mg daily  , Disp: , Rfl:     atorvastatin (LIPITOR) 40 mg tablet, Take 40 mg by mouth daily  , Disp: , Rfl:     citalopram (CeleXA) 40 mg tablet, Take 40 mg by mouth daily , Disp: , Rfl:     hydrochlorothiazide (HYDRODIURIL) 25 mg tablet, Take 25 mg by mouth daily , Disp: , Rfl:     JANUMET XR  MG TB24, Take 1 tablet by mouth daily , Disp: , Rfl:     lisinopril (ZESTRIL) 10 mg tablet, Lisinopril 10 MG Oral Tablet TAKE 1 TABLET DAILY AS DIRECTED  Quantity: 30;  Refills: 3    [facility];  Started 12-Nov-2015 Active, Disp: , Rfl:     oxybutynin (DITROPAN) 5 mg tablet, , Disp: , Rfl:     polyvinyl alcohol (LIQUIFILM TEARS) 1 4 % ophthalmic solution, Administer 1 drop to the right eye as needed for dry eyes (Patient not taking: Reported on 8/30/2021), Disp: 15 mL, Rfl: 0    solifenacin (VESICARE) 10 MG tablet, Take 1 tablet (10 mg total) by mouth daily (Patient not taking: Reported on 8/30/2021), Disp: 90 tablet, Rfl: 3  No Known Allergies    Labs:not applicable  Imaging: No results found  Review of Systems:  Review of Systems   All other systems reviewed and are negative  Physical Exam:  /60 (BP Location: Right arm, Patient Position: Sitting, Cuff Size: Standard)   Pulse (!) 46   Ht 5' 9" (1 753 m)   Wt 75 6 kg (166 lb 11 2 oz)   BMI 24 62 kg/m²   Physical Exam  Vitals reviewed  Constitutional:       Appearance: He is well-developed  HENT:      Head: Normocephalic and atraumatic  Eyes:      Conjunctiva/sclera: Conjunctivae normal       Pupils: Pupils are equal, round, and reactive to light  Cardiovascular:      Rate and Rhythm: Normal rate  Heart sounds: Murmur heard  Pulmonary:      Effort: Pulmonary effort is normal       Breath sounds: Normal breath sounds  Musculoskeletal:      Cervical back: Normal range of motion and neck supple     Skin: General: Skin is warm and dry  Neurological:      Mental Status: He is alert and oriented to person, place, and time  Discussion/Summary:I will continue the patient's present medical regimen  The patient appears well compensated  I have asked the patient to call if there is a problem in the interim otherwise I will see the patient in one years time  Patient is due for an echocardiogram prior to the next visit to assess LV wall thickness and systolic function

## 2021-08-30 ENCOUNTER — OFFICE VISIT (OUTPATIENT)
Dept: CARDIOLOGY CLINIC | Facility: CLINIC | Age: 76
End: 2021-08-30
Payer: MEDICARE

## 2021-08-30 VITALS
WEIGHT: 166.7 LBS | DIASTOLIC BLOOD PRESSURE: 60 MMHG | HEART RATE: 46 BPM | BODY MASS INDEX: 24.69 KG/M2 | HEIGHT: 69 IN | SYSTOLIC BLOOD PRESSURE: 156 MMHG

## 2021-08-30 DIAGNOSIS — I10 ESSENTIAL (PRIMARY) HYPERTENSION: Primary | ICD-10-CM

## 2021-08-30 DIAGNOSIS — Z95.2 S/P AVR: ICD-10-CM

## 2021-08-30 DIAGNOSIS — I25.10 CORONARY ARTERIOSCLEROSIS: ICD-10-CM

## 2021-08-30 DIAGNOSIS — E11.59 TYPE 2 DIABETES MELLITUS WITH OTHER CIRCULATORY COMPLICATION, WITHOUT LONG-TERM CURRENT USE OF INSULIN (HCC): ICD-10-CM

## 2021-08-30 DIAGNOSIS — E78.00 PURE HYPERCHOLESTEROLEMIA: ICD-10-CM

## 2021-08-30 PROCEDURE — 99214 OFFICE O/P EST MOD 30 MIN: CPT | Performed by: INTERNAL MEDICINE

## 2021-08-30 RX ORDER — OXYBUTYNIN CHLORIDE 5 MG/1
5 TABLET ORAL 2 TIMES DAILY
COMMUNITY
Start: 2021-08-09

## 2021-08-30 NOTE — PATIENT INSTRUCTIONS
I will continue the patient's present medical regimen  Patient appears well compensated  I have asked the patient to call if there is a problem in the interim otherwise I will see the patient in one years time  Will schedule echo to be done prior to his next visit

## 2021-09-27 ENCOUNTER — HOSPITAL ENCOUNTER (OUTPATIENT)
Dept: CT IMAGING | Facility: HOSPITAL | Age: 76
Discharge: HOME | End: 2021-09-27
Payer: MEDICARE

## 2021-09-27 DIAGNOSIS — Z72.0 NICOTINE ABUSE: ICD-10-CM

## 2021-09-27 PROCEDURE — 71271 CT THORAX LUNG CANCER SCR C-: CPT

## 2021-10-28 ENCOUNTER — OFFICE VISIT (OUTPATIENT)
Dept: OBGYN CLINIC | Facility: CLINIC | Age: 76
End: 2021-10-28
Payer: MEDICARE

## 2021-10-28 VITALS
HEART RATE: 81 BPM | BODY MASS INDEX: 24.73 KG/M2 | RESPIRATION RATE: 17 BRPM | SYSTOLIC BLOOD PRESSURE: 131 MMHG | HEIGHT: 69 IN | DIASTOLIC BLOOD PRESSURE: 78 MMHG | WEIGHT: 167 LBS

## 2021-10-28 DIAGNOSIS — M65.331 TRIGGER MIDDLE FINGER OF RIGHT HAND: Primary | ICD-10-CM

## 2021-10-28 PROCEDURE — 20550 NJX 1 TENDON SHEATH/LIGAMENT: CPT | Performed by: SURGERY

## 2021-10-28 PROCEDURE — 99204 OFFICE O/P NEW MOD 45 MIN: CPT | Performed by: SURGERY

## 2021-10-28 RX ORDER — DEXAMETHASONE SODIUM PHOSPHATE 100 MG/10ML
40 INJECTION INTRAMUSCULAR; INTRAVENOUS
Status: COMPLETED | OUTPATIENT
Start: 2021-10-28 | End: 2021-10-28

## 2021-10-28 RX ORDER — LIDOCAINE HYDROCHLORIDE 10 MG/ML
1 INJECTION, SOLUTION INFILTRATION; PERINEURAL
Status: COMPLETED | OUTPATIENT
Start: 2021-10-28 | End: 2021-10-28

## 2021-10-28 RX ADMIN — DEXAMETHASONE SODIUM PHOSPHATE 40 MG: 100 INJECTION INTRAMUSCULAR; INTRAVENOUS at 11:46

## 2021-10-28 RX ADMIN — LIDOCAINE HYDROCHLORIDE 1 ML: 10 INJECTION, SOLUTION INFILTRATION; PERINEURAL at 11:46

## 2021-11-11 ENCOUNTER — TELEPHONE (OUTPATIENT)
Dept: VASCULAR SURGERY | Facility: CLINIC | Age: 76
End: 2021-11-11

## 2021-11-11 DIAGNOSIS — I71.4 ABDOMINAL AORTIC ANEURYSM (AAA) WITHOUT RUPTURE (HCC): Primary | ICD-10-CM

## 2021-12-15 ENCOUNTER — OFFICE VISIT (OUTPATIENT)
Dept: OBGYN CLINIC | Facility: CLINIC | Age: 76
End: 2021-12-15
Payer: MEDICARE

## 2021-12-15 VITALS
WEIGHT: 167 LBS | DIASTOLIC BLOOD PRESSURE: 91 MMHG | BODY MASS INDEX: 24.73 KG/M2 | SYSTOLIC BLOOD PRESSURE: 156 MMHG | RESPIRATION RATE: 17 BRPM | HEIGHT: 69 IN | HEART RATE: 90 BPM

## 2021-12-15 DIAGNOSIS — M65.331 TRIGGER MIDDLE FINGER OF RIGHT HAND: Primary | ICD-10-CM

## 2021-12-15 PROCEDURE — 99213 OFFICE O/P EST LOW 20 MIN: CPT | Performed by: SURGERY

## 2022-03-07 ENCOUNTER — HOSPITAL ENCOUNTER (OUTPATIENT)
Dept: NON INVASIVE DIAGNOSTICS | Facility: CLINIC | Age: 77
Discharge: HOME | End: 2022-03-07
Payer: MEDICARE

## 2022-03-07 DIAGNOSIS — I71.4 ABDOMINAL AORTIC ANEURYSM (AAA) WITHOUT RUPTURE (HCC): ICD-10-CM

## 2022-03-07 PROCEDURE — 93978 VASCULAR STUDY: CPT

## 2022-03-07 PROCEDURE — 93978 VASCULAR STUDY: CPT | Performed by: SURGERY

## 2022-03-07 PROCEDURE — 93923 UPR/LXTR ART STDY 3+ LVLS: CPT

## 2022-03-24 ENCOUNTER — OFFICE VISIT (OUTPATIENT)
Dept: VASCULAR SURGERY | Facility: CLINIC | Age: 77
End: 2022-03-24
Payer: MEDICARE

## 2022-03-24 VITALS
OXYGEN SATURATION: 99 % | DIASTOLIC BLOOD PRESSURE: 70 MMHG | HEIGHT: 69 IN | HEART RATE: 58 BPM | RESPIRATION RATE: 16 BRPM | WEIGHT: 168.8 LBS | BODY MASS INDEX: 25 KG/M2 | SYSTOLIC BLOOD PRESSURE: 150 MMHG

## 2022-03-24 DIAGNOSIS — I65.23 BILATERAL CAROTID ARTERY STENOSIS: ICD-10-CM

## 2022-03-24 DIAGNOSIS — I71.4 ABDOMINAL AORTIC ANEURYSM (AAA) WITHOUT RUPTURE (HCC): Primary | ICD-10-CM

## 2022-03-24 DIAGNOSIS — E11.59 TYPE 2 DIABETES MELLITUS WITH OTHER CIRCULATORY COMPLICATION, WITHOUT LONG-TERM CURRENT USE OF INSULIN (HCC): ICD-10-CM

## 2022-03-24 PROCEDURE — 99213 OFFICE O/P EST LOW 20 MIN: CPT | Performed by: NURSE PRACTITIONER

## 2022-03-24 NOTE — PROGRESS NOTES
Assessment/Plan:    AAA (abdominal aortic aneurysm) [facility])  72-year-old male with HTN, HLD, CAD, CABG+AVR '13, DM w/ neuropathy, AAA s/p EVAR (ROSE), right iliac EVAR limb stenosis s/p angioplasty'18, smoker, sleep apnea, carotid stenosis who returns to the office for yearly visit to review EVAR duplex 3/7/22 and CV duplex 7/7/21   -EVAR duplex showed widely patent endograft without evidence of endoleak  Sac size 6 1 x 5 9 cm   -Prior CTA imaging 8/10/20 sac size measurds 6 3 x 5 9 cm   -Repeat abdominal duplex in 1 year  -Blood pressure slightly elevated    -Work on quitting smoking  -Follow up in the office in 1 year    Hypertension  -BP slightly elevated at office visit  -Follow up with PCP for a recheck    Bilateral carotid artery stenosis  -CV duplex 7/7/21 showed bilateral ICA less 50% stenosis   -Continue aspirin and Lipitor   -Repeat carotid duplex in 24 months       Diagnoses and all orders for this visit:    Abdominal aortic aneurysm (AAA) without rupture (HCC)  -     VAS evar endovascular aortic repair duplex; Future    Bilateral carotid artery stenosis  -     VAS carotid complete study; Future    Type 2 diabetes mellitus with other circulatory complication, without long-term current use of insulin (HCC)          Subjective:      Patient ID: Alycia Hough is a 77 y o  male  Pt is here to review results of EVAR on 3/7/2022  Also had CV done on 7/7/2021  Pt denied any change in appetite, pain after eating  Pt has c/o of some stiffness in his back, numbness and tingling in b/l feet and elevated blood pressures  Pt is on ASA 81 mg and Atorvastatin  Pt is a current every day smoker      HPI  72-year-old male with HTN, HLD, CAD, CABG+AVR '13, DM w/ neuropathy, AAA s/p EVAR (ROSE), right iliac EVAR limb stenosis s/p angioplasty'18, smoker, sleep apnea, carotid stenosis who returns to the office for yearly visit to review EVAR duplex 3/7/22 and CV duplex 7/7/21   Patient returns to the office for yearly office visit  Last seen in the office by Dr Ioana Escoto 2020  EVAR duplex showed a widely patent endograft without any evidence of endoleak, sac size measurements 5 9 x 6 1 cm  Stable in comparison to prior CTA   He denies any significant low back pain  He does have occasional musculoskeletal stiffness in the low back  Neuropathy affecting bilateral feet  He is on aspirin and atorvastatin  Unfortunately continues to smoke  Counseled on smoking cessation  The following portions of the patient's history were reviewed and updated as appropriate: allergies, current medications, past family history, past medical history, past social history, past surgical history and problem list   ROS reviewed     Review of Systems   Constitutional: Negative  HENT: Negative  Eyes: Negative  Respiratory: Positive for shortness of breath  Cardiovascular: Negative  Gastrointestinal: Negative  Endocrine: Negative  Genitourinary: Negative  Musculoskeletal: Positive for back pain  Skin: Negative  Allergic/Immunologic: Negative  Neurological: Positive for numbness  Hematological: Bruises/bleeds easily  Psychiatric/Behavioral: Negative  Objective:    I have reviewed and made appropriate changes to the review of systems input by the medical assistant      Vitals:    03/24/22 1052 03/24/22 1056 03/24/22 1133   BP: 168/72 160/68 150/70   BP Location: Right arm Left arm Left arm   Patient Position: Sitting Sitting    Cuff Size: Adult Adult    Pulse: 58     Resp: 16     SpO2: 99%     Weight: 76 6 kg (168 lb 12 8 oz)     Height: 5' 9" (1 753 m)         Patient Active Problem List   Diagnosis    [facility] ([facility])    Urinary frequency    Diabetes mellitus ([address] )    Hypertension    Hyperlipidemia    Bilateral carotid artery stenosis       Past Surgical History:   Procedure Laterality Date    AORTIC VALVE REPLACEMENT      APPENDECTOMY      CARDIAC SURGERY      CORONARY ARTERY BYPASS GRAFT      LAPAROSCOPIC COLON RESECTION      LEG SURGERY      [facility] REMOVED FROM LOWER EXTREMITIES    [place] AAA REPAIR,AORTO-AORTIC TUBE PROSTH N/A 1/6/2017    Procedure: REPAIR ANEURYSM ENDOVASCULAR ABDOMINAL AORTIC  (EVAR);   Surgeon: Izaiah Sharp MD;  Location[facility];  Service: Vascular    [place] Ilya Franco 3RD+ ORD SLCTV ABDL PEL/LXTR [facility] N/A 1/26/2018    Procedure: ANGIOGRAM; BILATERAL ILIAC ARTERY BALLOONING;  Surgeon: Izaiah Sharp MD;  [facility];  Service: Vascular    VALVE REPLACEMENT         Family History   Problem Relation Age of Onset    No Known Problems Mother     No Known Problems Father        Social History     Socioeconomic History    Marital status: /Civil Union     Spouse name: Not on file    Number of children: Not on file    Years of education: Not on file    Highest education level: Not on file   Occupational History    Not on file   Tobacco Use    Smoking status: Current Every Day Smoker     Packs/day: 1 00     Years: 30 00     Pack years: 30 00    Smokeless tobacco: Never Used   Vaping Use    Vaping Use: Never used   Substance and Sexual Activity    Alcohol use: Yes     Comment: SOCIAL    Drug use: No    Sexual activity: Never   Other Topics Concern    Not on file   Social History Narrative    Not on file     Social Determinants of Health     Financial Resource Strain: Not on file   Food Insecurity: Not on file   Transportation Needs: Not on file   Physical Activity: Not on file   Stress: Not on file   Social Connections: Not on file   Intimate Partner Violence: Not on file   Housing Stability: Not on file       No Known Allergies      Current Outpatient Medications:     aspirin 81 mg chewable tablet, Chew 81 mg daily  , Disp: , Rfl:     atorvastatin (LIPITOR) 40 mg tablet, Take 40 mg by mouth daily  , Disp: , Rfl:     citalopram (CeleXA) 10 mg tablet, Take 10 mg by mouth daily , Disp: , Rfl:     hydrochlorothiazide (HYDRODIURIL) 25 mg tablet, Take 25 mg by mouth daily , Disp: , Rfl:     JANUMET XR  MG TB24, Take 1 tablet by mouth daily , Disp: , Rfl:     lisinopril (ZESTRIL) 10 mg tablet, Lisinopril 10 MG Oral Tablet TAKE 1 TABLET DAILY AS DIRECTED  Quantity: 30;  Refills: 3    [facility];  Started 12-Nov-2015 Active, Disp: , Rfl:     oxybutynin (DITROPAN) 5 mg tablet, Take 5 mg by mouth 2 (two) times a day , Disp: , Rfl:     acetaminophen (TYLENOL) 325 mg tablet, Take 1 tablet every 6 hr as needed for pain  (Patient not taking: Reported on 10/28/2021), Disp: 30 tablet, Rfl: 0    polyvinyl alcohol (LIQUIFILM TEARS) 1 4 % ophthalmic solution, Administer 1 drop to the right eye as needed for dry eyes (Patient not taking: Reported on 8/30/2021), Disp: 15 mL, Rfl: 0    solifenacin (VESICARE) 10 MG tablet, Take 1 tablet (10 mg total) by mouth daily (Patient not taking: Reported on 8/30/2021), Disp: 90 tablet, Rfl: 3    /70 (BP Location: Left arm)   Pulse 58   Resp 16   Ht 5' 9" (1 753 m)   Wt 76 6 kg (168 lb 12 8 oz)   SpO2 99%   BMI 24 93 kg/m²          Physical Exam  Vitals and nursing note reviewed  Constitutional:       Appearance: Normal appearance  HENT:      Head: Normocephalic and atraumatic  Eyes:      Extraocular Movements: Extraocular movements intact  Cardiovascular:      Pulses:           Popliteal pulses are 1+ on the right side and 1+ on the left side  Dorsalis pedis pulses are 2+ on the right side and 2+ on the left side  Heart sounds: Normal heart sounds  Pulmonary:      Effort: Pulmonary effort is normal       Breath sounds: Normal breath sounds  Abdominal:      General: Bowel sounds are normal       Palpations: Abdomen is soft  Tenderness: There is no abdominal tenderness  Musculoskeletal:         General: Normal range of motion  Skin:     General: Skin is warm  Neurological:      General: No focal deficit present        Mental Status: He is alert and oriented to person, place, and time  Psychiatric:         Thought Content:  Thought content normal

## 2022-03-24 NOTE — PATIENT INSTRUCTIONS
Repeat abdominal duplex in 1 year  Follow-up in the office in 1 year  Blood pressure slightly elevated at office visit today  Follow up with PCP for a recheck  Work on quitting smoking

## 2022-05-02 PROBLEM — I65.23 BILATERAL CAROTID ARTERY STENOSIS: Status: ACTIVE | Noted: 2022-05-02

## 2022-05-02 NOTE — ASSESSMENT & PLAN NOTE
-CV duplex 7/7/21 showed bilateral ICA less 50% stenosis   -Continue aspirin and Lipitor   -Repeat carotid duplex in 24 months

## 2022-05-02 NOTE — ASSESSMENT & PLAN NOTE
71-year-old male with HTN, HLD, CAD, CABG+AVR '13, DM w/ neuropathy, AAA s/p EVAR (ROSE), right iliac EVAR limb stenosis s/p angioplasty'18, smoker, sleep apnea, carotid stenosis who returns to the office for yearly visit to review EVAR duplex 3/7/22 and CV duplex 7/7/21   -EVAR duplex showed widely patent endograft without evidence of endoleak   Sac size 6 1 x 5 9 cm   -Prior CTA imaging 8/10/20 sac size measurds 6 3 x 5 9 cm   -Repeat abdominal duplex in 1 year  -Blood pressure slightly elevated    -Work on quitting smoking  -Follow up in the office in 1 year

## 2022-07-08 ENCOUNTER — HOSPITAL ENCOUNTER (OUTPATIENT)
Dept: NON INVASIVE DIAGNOSTICS | Facility: CLINIC | Age: 77
Discharge: HOME | End: 2022-07-08
Payer: MEDICARE

## 2022-07-08 DIAGNOSIS — I65.23 BILATERAL CAROTID ARTERY STENOSIS: ICD-10-CM

## 2022-07-08 PROCEDURE — 93880 EXTRACRANIAL BILAT STUDY: CPT | Performed by: SURGERY

## 2022-07-08 PROCEDURE — 93880 EXTRACRANIAL BILAT STUDY: CPT

## 2022-07-11 ENCOUNTER — HOSPITAL ENCOUNTER (OUTPATIENT)
Dept: NON INVASIVE DIAGNOSTICS | Facility: CLINIC | Age: 77
Discharge: HOME | End: 2022-07-11
Payer: MEDICARE

## 2022-07-11 VITALS
BODY MASS INDEX: 24.88 KG/M2 | SYSTOLIC BLOOD PRESSURE: 150 MMHG | HEART RATE: 50 BPM | DIASTOLIC BLOOD PRESSURE: 70 MMHG | HEIGHT: 69 IN | WEIGHT: 168 LBS

## 2022-07-11 DIAGNOSIS — I25.10 CORONARY ARTERIOSCLEROSIS: ICD-10-CM

## 2022-07-11 DIAGNOSIS — I10 ESSENTIAL (PRIMARY) HYPERTENSION: ICD-10-CM

## 2022-07-11 DIAGNOSIS — Z95.2 S/P AVR: ICD-10-CM

## 2022-07-11 DIAGNOSIS — E78.00 PURE HYPERCHOLESTEROLEMIA: ICD-10-CM

## 2022-07-11 LAB
AORTIC ROOT: 2.8 CM
AORTIC VALVE MEAN VELOCITY: 24.1 M/S
APICAL FOUR CHAMBER EJECTION FRACTION: 55 %
AV AREA BY CONTINUOUS VTI: 0.9 CM2
AV AREA PEAK VELOCITY: 0.9 CM2
AV LVOT MEAN GRADIENT: 2 MMHG
AV LVOT PEAK GRADIENT: 3 MMHG
AV MEAN GRADIENT: 26 MMHG
AV PEAK GRADIENT: 42 MMHG
AV VALVE AREA: 0.94 CM2
AV VELOCITY RATIO: 0.28
DOP CALC AO PEAK VEL: 3.22 M/S
DOP CALC AO VTI: 75.33 CM
DOP CALC LVOT AREA: 3.14 CM2
DOP CALC LVOT DIAMETER: 2 CM
DOP CALC LVOT PEAK VEL VTI: 22.61 CM
DOP CALC LVOT PEAK VEL: 0.9 M/S
DOP CALC LVOT STROKE INDEX: 35.9 ML/M2
DOP CALC LVOT STROKE VOLUME: 71
E WAVE DECELERATION TIME: 357 MS
FRACTIONAL SHORTENING: 32 (ref 28–44)
INTERVENTRICULAR SEPTUM IN DIASTOLE (PARASTERNAL SHORT AXIS VIEW): 1.4 CM
INTERVENTRICULAR SEPTUM: 1.4 CM (ref 0.6–1.1)
LEFT ATRIUM AREA SYSTOLE SINGLE PLANE A4C: 15.7 CM2
LEFT ATRIUM SIZE: 3.3 CM
LEFT INTERNAL DIMENSION IN SYSTOLE: 2.1 CM (ref 2.1–4)
LEFT VENTRICULAR INTERNAL DIMENSION IN DIASTOLE: 3.1 CM (ref 3.5–6)
LEFT VENTRICULAR POSTERIOR WALL IN END DIASTOLE: 1.4 CM
LEFT VENTRICULAR STROKE VOLUME: 24 ML
LVSV (TEICH): 24 ML
MV E'TISSUE VEL-SEP: 7 CM/S
MV PEAK A VEL: 0.93 M/S
MV PEAK E VEL: 64 CM/S
MV STENOSIS PRESSURE HALF TIME: 104 MS
MV VALVE AREA P 1/2 METHOD: 2.12
RIGHT ATRIUM AREA SYSTOLE A4C: 14.4 CM2
RIGHT VENTRICLE ID DIMENSION: 3.6 CM
SL CV LV EF: 60
SL CV PED ECHO LEFT VENTRICLE DIASTOLIC VOLUME (MOD BIPLANE) 2D: 39 ML
SL CV PED ECHO LEFT VENTRICLE SYSTOLIC VOLUME (MOD BIPLANE) 2D: 14 ML

## 2022-07-11 PROCEDURE — 93306 TTE W/DOPPLER COMPLETE: CPT

## 2022-07-11 PROCEDURE — 93306 TTE W/DOPPLER COMPLETE: CPT | Performed by: INTERNAL MEDICINE

## 2022-08-29 ENCOUNTER — APPOINTMENT (OUTPATIENT)
Dept: LAB | Facility: CLINIC | Age: 77
End: 2022-08-29
Payer: MEDICARE

## 2022-08-29 ENCOUNTER — TRANSCRIBE ORDERS (OUTPATIENT)
Dept: LAB | Facility: CLINIC | Age: 77
End: 2022-08-29

## 2022-08-29 DIAGNOSIS — E13.69 OTHER SPECIFIED DIABETES MELLITUS WITH OTHER SPECIFIED COMPLICATION, UNSPECIFIED WHETHER LONG TERM INSULIN USE (HCC): ICD-10-CM

## 2022-08-29 DIAGNOSIS — E13.69 OTHER SPECIFIED DIABETES MELLITUS WITH OTHER SPECIFIED COMPLICATION, UNSPECIFIED WHETHER LONG TERM INSULIN USE (HCC): Primary | ICD-10-CM

## 2022-08-29 DIAGNOSIS — R53.83 TIREDNESS: ICD-10-CM

## 2022-08-29 DIAGNOSIS — R35.0 URINARY FREQUENCY: ICD-10-CM

## 2022-08-29 DIAGNOSIS — E78.5 HYPERLIPIDEMIA, UNSPECIFIED HYPERLIPIDEMIA TYPE: ICD-10-CM

## 2022-08-29 LAB
ALBUMIN SERPL BCP-MCNC: 3.6 G/DL (ref 3.5–5)
ALP SERPL-CCNC: 72 U/L (ref 46–116)
ALT SERPL W P-5'-P-CCNC: 16 U/L (ref 12–78)
ANION GAP SERPL CALCULATED.3IONS-SCNC: 3 MMOL/L (ref 4–13)
AST SERPL W P-5'-P-CCNC: 7 U/L (ref 5–45)
BACTERIA UR QL AUTO: ABNORMAL /HPF
BASOPHILS # BLD AUTO: 0.02 THOUSANDS/ΜL (ref 0–0.1)
BASOPHILS NFR BLD AUTO: 0 % (ref 0–1)
BILIRUB SERPL-MCNC: 0.66 MG/DL (ref 0.2–1)
BILIRUB UR QL STRIP: NEGATIVE
BUN SERPL-MCNC: 21 MG/DL (ref 5–25)
CALCIUM SERPL-MCNC: 9.3 MG/DL (ref 8.3–10.1)
CHLORIDE SERPL-SCNC: 108 MMOL/L (ref 96–108)
CHOLEST SERPL-MCNC: 94 MG/DL
CLARITY UR: CLEAR
CO2 SERPL-SCNC: 29 MMOL/L (ref 21–32)
COLOR UR: ABNORMAL
CREAT SERPL-MCNC: 0.95 MG/DL (ref 0.6–1.3)
CREAT UR-MCNC: 96.6 MG/DL
EOSINOPHIL # BLD AUTO: 0.1 THOUSAND/ΜL (ref 0–0.61)
EOSINOPHIL NFR BLD AUTO: 2 % (ref 0–6)
ERYTHROCYTE [DISTWIDTH] IN BLOOD BY AUTOMATED COUNT: 12.2 % (ref 11.6–15.1)
EST. AVERAGE GLUCOSE BLD GHB EST-MCNC: 123 MG/DL
GFR SERPL CREATININE-BSD FRML MDRD: 77 ML/MIN/1.73SQ M
GLUCOSE P FAST SERPL-MCNC: 108 MG/DL (ref 65–99)
GLUCOSE UR STRIP-MCNC: NEGATIVE MG/DL
HBA1C MFR BLD: 5.9 %
HCT VFR BLD AUTO: 46.7 % (ref 36.5–49.3)
HDLC SERPL-MCNC: 38 MG/DL
HGB BLD-MCNC: 15.4 G/DL (ref 12–17)
HGB UR QL STRIP.AUTO: ABNORMAL
IMM GRANULOCYTES # BLD AUTO: 0.03 THOUSAND/UL (ref 0–0.2)
IMM GRANULOCYTES NFR BLD AUTO: 0 % (ref 0–2)
KETONES UR STRIP-MCNC: NEGATIVE MG/DL
LDLC SERPL CALC-MCNC: 45 MG/DL (ref 0–100)
LEUKOCYTE ESTERASE UR QL STRIP: NEGATIVE
LYMPHOCYTES # BLD AUTO: 1.02 THOUSANDS/ΜL (ref 0.6–4.47)
LYMPHOCYTES NFR BLD AUTO: 15 % (ref 14–44)
MCH RBC QN AUTO: 30.2 PG (ref 26.8–34.3)
MCHC RBC AUTO-ENTMCNC: 33 G/DL (ref 31.4–37.4)
MCV RBC AUTO: 92 FL (ref 82–98)
MICROALBUMIN UR-MCNC: 24.9 MG/L (ref 0–20)
MICROALBUMIN/CREAT 24H UR: 26 MG/G CREATININE (ref 0–30)
MONOCYTES # BLD AUTO: 0.58 THOUSAND/ΜL (ref 0.17–1.22)
MONOCYTES NFR BLD AUTO: 9 % (ref 4–12)
NEUTROPHILS # BLD AUTO: 5.04 THOUSANDS/ΜL (ref 1.85–7.62)
NEUTS SEG NFR BLD AUTO: 74 % (ref 43–75)
NITRITE UR QL STRIP: NEGATIVE
NON-SQ EPI CELLS URNS QL MICRO: ABNORMAL /HPF
NONHDLC SERPL-MCNC: 56 MG/DL
NRBC BLD AUTO-RTO: 0 /100 WBCS
PH UR STRIP.AUTO: 7 [PH]
PLATELET # BLD AUTO: 165 THOUSANDS/UL (ref 149–390)
PMV BLD AUTO: 9.5 FL (ref 8.9–12.7)
POTASSIUM SERPL-SCNC: 4.1 MMOL/L (ref 3.5–5.3)
PROT SERPL-MCNC: 6.7 G/DL (ref 6.4–8.4)
PROT UR STRIP-MCNC: NEGATIVE MG/DL
PSA SERPL-MCNC: 2.6 NG/ML (ref 0–4)
RBC # BLD AUTO: 5.1 MILLION/UL (ref 3.88–5.62)
RBC #/AREA URNS AUTO: ABNORMAL /HPF
SODIUM SERPL-SCNC: 140 MMOL/L (ref 135–147)
SP GR UR STRIP.AUTO: 1.02 (ref 1–1.03)
T3 SERPL-MCNC: 1.2 NG/ML (ref 0.6–1.8)
T4 FREE SERPL-MCNC: 1.22 NG/DL (ref 0.76–1.46)
TRIGL SERPL-MCNC: 56 MG/DL
TSH SERPL DL<=0.05 MIU/L-ACNC: 2.52 UIU/ML (ref 0.45–4.5)
UROBILINOGEN UR STRIP-ACNC: <2 MG/DL
WBC # BLD AUTO: 6.79 THOUSAND/UL (ref 4.31–10.16)
WBC #/AREA URNS AUTO: ABNORMAL /HPF

## 2022-08-29 PROCEDURE — 84480 ASSAY TRIIODOTHYRONINE (T3): CPT

## 2022-08-29 PROCEDURE — 80053 COMPREHEN METABOLIC PANEL: CPT

## 2022-08-29 PROCEDURE — 82043 UR ALBUMIN QUANTITATIVE: CPT

## 2022-08-29 PROCEDURE — 81001 URINALYSIS AUTO W/SCOPE: CPT

## 2022-08-29 PROCEDURE — 80061 LIPID PANEL: CPT

## 2022-08-29 PROCEDURE — 83036 HEMOGLOBIN GLYCOSYLATED A1C: CPT

## 2022-08-29 PROCEDURE — 84443 ASSAY THYROID STIM HORMONE: CPT

## 2022-08-29 PROCEDURE — 84439 ASSAY OF FREE THYROXINE: CPT

## 2022-08-29 PROCEDURE — 85025 COMPLETE CBC W/AUTO DIFF WBC: CPT

## 2022-08-29 PROCEDURE — 82570 ASSAY OF URINE CREATININE: CPT

## 2022-08-29 PROCEDURE — 84153 ASSAY OF PSA TOTAL: CPT

## 2022-08-29 PROCEDURE — 36415 COLL VENOUS BLD VENIPUNCTURE: CPT

## 2022-09-08 ENCOUNTER — HOSPITAL ENCOUNTER (OUTPATIENT)
Dept: RADIOLOGY | Age: 77
Discharge: HOME | End: 2022-09-08
Payer: MEDICARE

## 2022-09-08 DIAGNOSIS — R06.02 SHORTNESS OF BREATH: ICD-10-CM

## 2022-09-08 PROCEDURE — 71260 CT THORAX DX C+: CPT

## 2022-09-08 RX ADMIN — IOHEXOL 85 ML: 350 INJECTION, SOLUTION INTRAVENOUS at 13:07

## 2023-02-27 ENCOUNTER — TRANSCRIBE ORDERS (OUTPATIENT)
Dept: LAB | Facility: CLINIC | Age: 78
End: 2023-02-27

## 2023-02-27 ENCOUNTER — APPOINTMENT (OUTPATIENT)
Dept: LAB | Facility: CLINIC | Age: 78
End: 2023-02-27

## 2023-02-27 DIAGNOSIS — E11.69 TYPE 2 DIABETES MELLITUS WITH OTHER SPECIFIED COMPLICATION, UNSPECIFIED WHETHER LONG TERM INSULIN USE (HCC): Primary | ICD-10-CM

## 2023-02-27 DIAGNOSIS — E11.69 TYPE 2 DIABETES MELLITUS WITH OTHER SPECIFIED COMPLICATION, UNSPECIFIED WHETHER LONG TERM INSULIN USE (HCC): ICD-10-CM

## 2023-02-27 LAB
ALBUMIN SERPL BCP-MCNC: 3.8 G/DL (ref 3.5–5)
ALP SERPL-CCNC: 73 U/L (ref 46–116)
ALT SERPL W P-5'-P-CCNC: 18 U/L (ref 12–78)
ANION GAP SERPL CALCULATED.3IONS-SCNC: 1 MMOL/L (ref 4–13)
AST SERPL W P-5'-P-CCNC: 10 U/L (ref 5–45)
BASOPHILS # BLD AUTO: 0.02 THOUSANDS/ÂΜL (ref 0–0.1)
BASOPHILS NFR BLD AUTO: 0 % (ref 0–1)
BILIRUB SERPL-MCNC: 0.82 MG/DL (ref 0.2–1)
BUN SERPL-MCNC: 21 MG/DL (ref 5–25)
CALCIUM SERPL-MCNC: 9.8 MG/DL (ref 8.3–10.1)
CHLORIDE SERPL-SCNC: 107 MMOL/L (ref 96–108)
CO2 SERPL-SCNC: 30 MMOL/L (ref 21–32)
CREAT SERPL-MCNC: 0.88 MG/DL (ref 0.6–1.3)
EOSINOPHIL # BLD AUTO: 0.1 THOUSAND/ÂΜL (ref 0–0.61)
EOSINOPHIL NFR BLD AUTO: 1 % (ref 0–6)
ERYTHROCYTE [DISTWIDTH] IN BLOOD BY AUTOMATED COUNT: 12 % (ref 11.6–15.1)
GFR SERPL CREATININE-BSD FRML MDRD: 83 ML/MIN/1.73SQ M
GLUCOSE P FAST SERPL-MCNC: 111 MG/DL (ref 65–99)
HCT VFR BLD AUTO: 49 % (ref 36.5–49.3)
HGB BLD-MCNC: 16.3 G/DL (ref 12–17)
IMM GRANULOCYTES # BLD AUTO: 0.02 THOUSAND/UL (ref 0–0.2)
IMM GRANULOCYTES NFR BLD AUTO: 0 % (ref 0–2)
LYMPHOCYTES # BLD AUTO: 1.3 THOUSANDS/ÂΜL (ref 0.6–4.47)
LYMPHOCYTES NFR BLD AUTO: 17 % (ref 14–44)
MCH RBC QN AUTO: 30.1 PG (ref 26.8–34.3)
MCHC RBC AUTO-ENTMCNC: 33.3 G/DL (ref 31.4–37.4)
MCV RBC AUTO: 90 FL (ref 82–98)
MONOCYTES # BLD AUTO: 0.68 THOUSAND/ÂΜL (ref 0.17–1.22)
MONOCYTES NFR BLD AUTO: 9 % (ref 4–12)
NEUTROPHILS # BLD AUTO: 5.47 THOUSANDS/ÂΜL (ref 1.85–7.62)
NEUTS SEG NFR BLD AUTO: 73 % (ref 43–75)
NRBC BLD AUTO-RTO: 0 /100 WBCS
PLATELET # BLD AUTO: 177 THOUSANDS/UL (ref 149–390)
PMV BLD AUTO: 9.5 FL (ref 8.9–12.7)
POTASSIUM SERPL-SCNC: 4.1 MMOL/L (ref 3.5–5.3)
PROT SERPL-MCNC: 6.6 G/DL (ref 6.4–8.4)
RBC # BLD AUTO: 5.42 MILLION/UL (ref 3.88–5.62)
SODIUM SERPL-SCNC: 138 MMOL/L (ref 135–147)
WBC # BLD AUTO: 7.59 THOUSAND/UL (ref 4.31–10.16)

## 2023-02-28 LAB
EST. AVERAGE GLUCOSE BLD GHB EST-MCNC: 120 MG/DL
HBA1C MFR BLD: 5.8 %

## 2023-03-24 ENCOUNTER — TELEPHONE (OUTPATIENT)
Dept: VASCULAR SURGERY | Facility: CLINIC | Age: 78
End: 2023-03-24

## 2023-03-24 ENCOUNTER — HOSPITAL ENCOUNTER (OUTPATIENT)
Dept: NON INVASIVE DIAGNOSTICS | Facility: CLINIC | Age: 78
Discharge: HOME | End: 2023-03-24

## 2023-03-24 DIAGNOSIS — I71.40 ABDOMINAL AORTIC ANEURYSM (AAA) WITHOUT RUPTURE (HCC): ICD-10-CM

## 2023-03-24 DIAGNOSIS — I71.43 INFRARENAL ABDOMINAL AORTIC ANEURYSM (AAA) WITHOUT RUPTURE (HCC): Primary | ICD-10-CM

## 2023-03-24 NOTE — TELEPHONE ENCOUNTER
----- Message from Serene Braun MD sent at 3/24/2023  1:43 PM EDT -----  Reviewed duplex, there is enlarged of AAA sac  Will need CTA and office visit to review  Last CTA was in Aug 2020  CTA has been ordered  Patient has recent labwork and creatinine is normal  Thanks

## 2023-03-24 NOTE — PROGRESS NOTES
Reviewed duplex, there is enlarged of AAA sac  Will need CTA and office visit to review  Last CTA was in Aug 2020

## 2023-03-27 NOTE — TELEPHONE ENCOUNTER
Spoke with pt and made him aware  Transferred to Central Scheduling to schedule the CTA  Will route encounter to the Call Center to schedule OV with VS after the CTA to review

## 2023-04-19 PROBLEM — I97.89 TYPE II ENDOLEAK OF AORTIC GRAFT: Status: ACTIVE | Noted: 2023-04-19

## 2023-05-16 ENCOUNTER — TELEMEDICINE (OUTPATIENT)
Dept: INTERVENTIONAL RADIOLOGY/VASCULAR | Facility: CLINIC | Age: 78
End: 2023-05-16

## 2023-05-16 DIAGNOSIS — I71.43 INFRARENAL ABDOMINAL AORTIC ANEURYSM (AAA) WITHOUT RUPTURE (HCC): ICD-10-CM

## 2023-05-16 DIAGNOSIS — I97.89 TYPE II ENDOLEAK OF AORTIC GRAFT: Primary | ICD-10-CM

## 2023-05-16 NOTE — PROGRESS NOTES
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 lumbar arterial endoleak  I described the procedural details, risk and benefits of angiography  I described that if not successful or possible, a second procedure could be performed which would involve direct sac puncture  I described the procedural details, risk and benefits of that procedure as well  Following our discussion, patient wishes to proceed  Will initiate IR scheduling  Reason for visit is   Chief Complaint   Patient presents with   • Virtual Regular Visit        Encounter provider Max Guzmán DO    Provider located at [address] [place] [address],Third Floor  [facility] [address]  [phone]      Recent Visits  No visits were found meeting these conditions  Showing recent visits within past 7 days and meeting all other requirements  Today's Visits  Date Type Provider Dept   05/16/23 Telemedicine Max Guzmán DO [facility]   Showing today's visits and meeting all other requirements  Future Appointments  No visits were found meeting these conditions  Showing future appointments within next 150 days and meeting all other requirements       The patient was identified by name and date of birth  Jagdish Never was informed that this is a telemedicine visit and that the visit is being conducted through Telephone    My office door was closed  No one else was in the room  He acknowledged consent and understanding of privacy and security of the video platform  The patient has agreed to participate and understands they can discontinue the visit at any time  Patient is aware this is a billable service  CC: Enlarging aortic aneurysm  HPI: 67-year-old male with history of abdominal aortic aneurysm status post EVAR 1/6/2017 and iliac limb PTA 1/26/2018 followed by surveillance CTA imaging with demonstration of mild progressive enlargement of the aneurysm sac with probable type II endoleak as this was identified on the index procedure  Multiple CTAs have been reviewed, last performed 4/12/2023  Sac measured 6 8 x 6 8 cm, enlarged from 5 7 x 6 1 cm compared to CTA in 8/10/2020  Suspicion of lumbar type II endoleak, possibly from L3 region  AMBER origin is occluded with distal reconstitution  Followed by vascular surgery with recommendation of arteriography and potential lumbar artery coil embolization  Denies abdominal or back pain  Currently smokes cigarettes, attempting to quit  Fairly active  On aspirin and statin  Mentioned that he is being treated for inguinal warts, currently on the left side  Past Medical History:   Diagnosis Date   • AAA (abdominal aortic aneurysm) ([facility] [address] )    • Diabetes mellitus ([facility] [address] )    • Diverticulosis    • GERD (gastroesophageal reflux disease)    • Hyperlipidemia    • Hypertension    • Sleep apnea     NOT DIAGNOSED       Past Surgical History:   Procedure Laterality Date   • AORTIC VALVE REPLACEMENT     • APPENDECTOMY     • CARDIAC SURGERY     • CORONARY ARTERY BYPASS GRAFT     • LAPAROSCOPIC COLON RESECTION     • LEG SURGERY      [facility] REMOVED FROM LOWER EXTREMITIES   • [place] AAA REPAIR,AORTO-AORTIC TUBE PROSTH N/A 1/6/2017    Procedure: REPAIR ANEURYSM ENDOVASCULAR ABDOMINAL AORTIC  (EVAR);   Surgeon: Ilan Card MD;  [facility];  Service: Vascular   • RI SLCTV CATHJ 3RD+ ORD [address] [facility] ABDL PEL/LXTR [facility] N/A 1/26/2018    Procedure: ANGIOGRAM; BILATERAL ILIAC ARTERY BALLOONING;  Surgeon: Carla Stern MD;  Location: [facility];  Service: Vascular   • VALVE REPLACEMENT         Current Outpatient Medications   Medication Sig Dispense Refill   • acetaminophen (TYLENOL) 325 mg tablet Take 1 tablet every 6 hr as needed for pain  (Patient not taking: Reported on 10/28/2021) 30 tablet 0   • aspirin 81 mg chewable tablet Chew 81 mg daily       • atorvastatin (LIPITOR) 40 mg tablet Take 40 mg by mouth daily       • citalopram (CeleXA) 10 mg tablet Take 10 mg by mouth daily      • hydrochlorothiazide (HYDRODIURIL) 25 mg tablet Take 25 mg by mouth daily  (Patient not taking: Reported on 4/19/2023)     • JANUMET XR  MG TB24 Take 1 tablet by mouth daily      • lisinopril (ZESTRIL) 10 mg tablet Lisinopril 10 MG Oral Tablet  TAKE 1 TABLET DAILY AS DIRECTED  Quantity: 30;  Refills: 3       Romayne Nyhan [facility];  Started 12-Nov-2015  Active     • oxybutynin (DITROPAN) 5 mg tablet Take 5 mg by mouth 2 (two) times a day      • polyvinyl alcohol (LIQUIFILM TEARS) 1 4 % ophthalmic solution Administer 1 drop to the right eye as needed for dry eyes (Patient not taking: Reported on 8/30/2021) 15 mL 0   • solifenacin (VESICARE) 10 MG tablet Take 1 tablet (10 mg total) by mouth daily (Patient not taking: Reported on 8/30/2021) 90 tablet 3     No current facility-administered medications for this visit  No Known Allergies    Review of Systems   All other systems reviewed and are negative            Visit Time  Total Visit Duration: 30

## 2023-05-17 ENCOUNTER — TELEPHONE (OUTPATIENT)
Dept: VASCULAR SURGERY | Facility: CLINIC | Age: 78
End: 2023-05-17

## 2023-05-17 DIAGNOSIS — I71.43 INFRARENAL ABDOMINAL AORTIC ANEURYSM (AAA) WITHOUT RUPTURE (HCC): Primary | ICD-10-CM

## 2023-05-17 NOTE — TELEPHONE ENCOUNTER
Spoke with pt's wife, Tri Orr  Pt is scheduled for IR procedure AAA endoleak emobilization femoral access on 7/6/23, by Dr Kai Orr stated that she spoke to IR today, and was advised to contact Dr Vincent Cornea office and ask if Dr Axel Mathias can order the pre procedure labs  Routed to provider to advise if this can be done, or deferred to IR

## 2023-05-17 NOTE — TELEPHONE ENCOUNTER
Spoke with pt's wife and made her aware the labs were ordered  Pt will go to [facility]'s lab to have them drawn

## 2023-06-15 ENCOUNTER — APPOINTMENT (OUTPATIENT)
Dept: LAB | Facility: AMBULARY SURGERY CENTER | Age: 78
End: 2023-06-15
Payer: COMMERCIAL

## 2023-06-15 DIAGNOSIS — I71.43 INFRARENAL ABDOMINAL AORTIC ANEURYSM (AAA) WITHOUT RUPTURE (HCC): ICD-10-CM

## 2023-06-15 LAB
ANION GAP SERPL CALCULATED.3IONS-SCNC: 3 MMOL/L (ref 4–13)
BUN SERPL-MCNC: 16 MG/DL (ref 5–25)
CALCIUM SERPL-MCNC: 9.5 MG/DL (ref 8.3–10.1)
CHLORIDE SERPL-SCNC: 106 MMOL/L (ref 96–108)
CO2 SERPL-SCNC: 27 MMOL/L (ref 21–32)
CREAT SERPL-MCNC: 0.94 MG/DL (ref 0.6–1.3)
ERYTHROCYTE [DISTWIDTH] IN BLOOD BY AUTOMATED COUNT: 12.4 % (ref 11.6–15.1)
GFR SERPL CREATININE-BSD FRML MDRD: 78 ML/MIN/1.73SQ M
GLUCOSE SERPL-MCNC: 106 MG/DL (ref 65–140)
HCT VFR BLD AUTO: 46.6 % (ref 36.5–49.3)
HGB BLD-MCNC: 16 G/DL (ref 12–17)
INR PPP: 0.98 (ref 0.84–1.19)
MCH RBC QN AUTO: 30.9 PG (ref 26.8–34.3)
MCHC RBC AUTO-ENTMCNC: 34.3 G/DL (ref 31.4–37.4)
MCV RBC AUTO: 90 FL (ref 82–98)
PLATELET # BLD AUTO: 189 THOUSANDS/UL (ref 149–390)
PMV BLD AUTO: 9.9 FL (ref 8.9–12.7)
POTASSIUM SERPL-SCNC: 3.7 MMOL/L (ref 3.5–5.3)
PROTHROMBIN TIME: 13.2 SECONDS (ref 11.6–14.5)
RBC # BLD AUTO: 5.17 MILLION/UL (ref 3.88–5.62)
SODIUM SERPL-SCNC: 136 MMOL/L (ref 135–147)
WBC # BLD AUTO: 8.02 THOUSAND/UL (ref 4.31–10.16)

## 2023-06-15 PROCEDURE — 85610 PROTHROMBIN TIME: CPT

## 2023-06-15 PROCEDURE — 36415 COLL VENOUS BLD VENIPUNCTURE: CPT

## 2023-06-15 PROCEDURE — 80048 BASIC METABOLIC PNL TOTAL CA: CPT

## 2023-06-15 PROCEDURE — 85027 COMPLETE CBC AUTOMATED: CPT

## 2023-06-27 ENCOUNTER — TELEPHONE (OUTPATIENT)
Dept: RADIOLOGY | Facility: HOSPITAL | Age: 78
End: 2023-06-27

## 2023-06-27 RX ORDER — SODIUM CHLORIDE 9 MG/ML
75 INJECTION, SOLUTION INTRAVENOUS CONTINUOUS
Status: CANCELLED | OUTPATIENT
Start: 2023-06-27

## 2023-06-27 NOTE — PRE-PROCEDURE INSTRUCTIONS
Pre-procedure Instructions for Interventional Radiology  [address]  [place] [address] Makayla [place]on [address]  [address] [place] [address] [phone]    You are scheduled for a/an AAA Endoleak Embolization  On Thursday 7/6/23  Your tentative arrival time is 0930  Short stay will notify you the day before your procedure with the exact arrival time and the location to arrive  To prepare for your procedure:  1  Please arrange for someone to drive you home after the procedure and stay with you until the next morning if you are instructed to do so  This is typically for patients receiving some type of sedative or anesthetic for the procedure  2  DO NOT EAT OR DRINK ANYTHING after midnight on the evening before your procedure including candy & gum   3  ONLY SIPS OF WATER with your medications are allowed on the morning of your procedure  4  TAKE ALL OF YOUR REGULAR MEDICATIONS THE MORNING OF YOUR PROCEDURE with sips of water! We may call you to stop some of your blood sugar, blood pressure and blood thinning medications depending on the procedure  Please take all of these medications unless we instruct you to stop them  5  If you have an allergy to x-ray dye, please contact Interventional Radiology for an x-ray dye preparation which usually consists of an oral steroid and Benadryl  The day of your procedure:  1  Bring a list of the medications you take at home  2  Bring medications you take for breathing problems (such as inhalers), medications for chest pain, or both  3  Bring a case for your glasses or contacts  4  Bring your insurance card and a form of photo ID   5  Please leave all valuables such as credit cards and jewelry at home  6  Report to the registration desk in the main lobby at the [facility]  Ask to be directed to [facility]    7  While your procedure is being performed, your family may wait in the Radiology Waiting Room on the 1st floor in Radiology  if they need to leave, they may provide a number to be called following the procedure  8  Be prepared to stay overnight just in case  Sometimes procedures will indicate the need for further observation or treatment  9  If you are scheduled for a follow-up visit with the Interventional Radiologist after your procedure, you will be called with a date and time  Special Instructions (Medications to stop taking before your procedure etc ):  Janumet hold 7/6/23 & 7/7/23

## 2023-07-06 ENCOUNTER — HOSPITAL ENCOUNTER (OUTPATIENT)
Dept: RADIOLOGY | Facility: HOSPITAL | Age: 78
Discharge: HOME | End: 2023-07-06
Attending: RADIOLOGY
Payer: COMMERCIAL

## 2023-07-06 VITALS
SYSTOLIC BLOOD PRESSURE: 197 MMHG | WEIGHT: 160 LBS | BODY MASS INDEX: 23.7 KG/M2 | OXYGEN SATURATION: 96 % | TEMPERATURE: 97.7 F | RESPIRATION RATE: 16 BRPM | DIASTOLIC BLOOD PRESSURE: 78 MMHG | HEART RATE: 75 BPM | HEIGHT: 69 IN

## 2023-07-06 DIAGNOSIS — I97.89 TYPE II ENDOLEAK OF AORTIC GRAFT: ICD-10-CM

## 2023-07-06 LAB
ANION GAP SERPL CALCULATED.3IONS-SCNC: 4 MMOL/L
BUN SERPL-MCNC: 16 MG/DL (ref 5–25)
CALCIUM SERPL-MCNC: 9.1 MG/DL (ref 8.3–10.1)
CHLORIDE SERPL-SCNC: 107 MMOL/L (ref 96–108)
CO2 SERPL-SCNC: 28 MMOL/L (ref 21–32)
CREAT SERPL-MCNC: 0.84 MG/DL (ref 0.6–1.3)
ERYTHROCYTE [DISTWIDTH] IN BLOOD BY AUTOMATED COUNT: 12.4 % (ref 11.6–15.1)
GFR SERPL CREATININE-BSD FRML MDRD: 85 ML/MIN/1.73SQ M
GLUCOSE P FAST SERPL-MCNC: 122 MG/DL (ref 65–99)
GLUCOSE SERPL-MCNC: 122 MG/DL (ref 65–140)
HCT VFR BLD AUTO: 43.8 % (ref 36.5–49.3)
HGB BLD-MCNC: 15.5 G/DL (ref 12–17)
INR PPP: 0.99 (ref 0.84–1.19)
MCH RBC QN AUTO: 31.2 PG (ref 26.8–34.3)
MCHC RBC AUTO-ENTMCNC: 35.4 G/DL (ref 31.4–37.4)
MCV RBC AUTO: 88 FL (ref 82–98)
PLATELET # BLD AUTO: 147 THOUSANDS/UL (ref 149–390)
PMV BLD AUTO: 9.5 FL (ref 8.9–12.7)
POTASSIUM SERPL-SCNC: 3.6 MMOL/L (ref 3.5–5.3)
PROTHROMBIN TIME: 13.3 SECONDS (ref 11.6–14.5)
RBC # BLD AUTO: 4.97 MILLION/UL (ref 3.88–5.62)
SODIUM SERPL-SCNC: 139 MMOL/L (ref 135–147)
WBC # BLD AUTO: 7.63 THOUSAND/UL (ref 4.31–10.16)

## 2023-07-06 PROCEDURE — 36248 INS CATH ABD/L-EXT ART ADDL: CPT | Performed by: RADIOLOGY

## 2023-07-06 PROCEDURE — 85027 COMPLETE CBC AUTOMATED: CPT | Performed by: RADIOLOGY

## 2023-07-06 PROCEDURE — C1769 GUIDE WIRE: HCPCS

## 2023-07-06 PROCEDURE — 85610 PROTHROMBIN TIME: CPT | Performed by: RADIOLOGY

## 2023-07-06 PROCEDURE — 37242 VASC EMBOLIZE/OCCLUDE ARTERY: CPT | Performed by: RADIOLOGY

## 2023-07-06 PROCEDURE — C1887 CATHETER, GUIDING: HCPCS

## 2023-07-06 PROCEDURE — 36245 INS CATH ABD/L-EXT ART 1ST: CPT

## 2023-07-06 PROCEDURE — 37242 VASC EMBOLIZE/OCCLUDE ARTERY: CPT

## 2023-07-06 PROCEDURE — 99152 MOD SED SAME PHYS/QHP 5/>YRS: CPT | Performed by: RADIOLOGY

## 2023-07-06 PROCEDURE — 36246 INS CATH ABD/L-EXT ART 2ND: CPT | Performed by: RADIOLOGY

## 2023-07-06 PROCEDURE — 80048 BASIC METABOLIC PNL TOTAL CA: CPT | Performed by: RADIOLOGY

## 2023-07-06 PROCEDURE — 76937 US GUIDE VASCULAR ACCESS: CPT | Performed by: RADIOLOGY

## 2023-07-06 PROCEDURE — 36247 INS CATH ABD/L-EXT ART 3RD: CPT | Performed by: RADIOLOGY

## 2023-07-06 PROCEDURE — 99153 MOD SED SAME PHYS/QHP EA: CPT

## 2023-07-06 PROCEDURE — 76937 US GUIDE VASCULAR ACCESS: CPT

## 2023-07-06 PROCEDURE — C1894 INTRO/SHEATH, NON-LASER: HCPCS

## 2023-07-06 PROCEDURE — C1760 CLOSURE DEV, VASC: HCPCS

## 2023-07-06 PROCEDURE — 99152 MOD SED SAME PHYS/QHP 5/>YRS: CPT

## 2023-07-06 RX ORDER — MIDAZOLAM HYDROCHLORIDE 2 MG/2ML
INJECTION, SOLUTION INTRAMUSCULAR; INTRAVENOUS AS NEEDED
Status: COMPLETED | OUTPATIENT
Start: 2023-07-06 | End: 2023-07-06

## 2023-07-06 RX ORDER — HYDRALAZINE HYDROCHLORIDE 20 MG/ML
INJECTION INTRAMUSCULAR; INTRAVENOUS AS NEEDED
Status: COMPLETED | OUTPATIENT
Start: 2023-07-06 | End: 2023-07-06

## 2023-07-06 RX ORDER — FENTANYL CITRATE 50 UG/ML
INJECTION, SOLUTION INTRAMUSCULAR; INTRAVENOUS AS NEEDED
Status: COMPLETED | OUTPATIENT
Start: 2023-07-06 | End: 2023-07-06

## 2023-07-06 RX ORDER — SODIUM CHLORIDE 9 MG/ML
75 INJECTION, SOLUTION INTRAVENOUS CONTINUOUS
Status: DISCONTINUED | OUTPATIENT
Start: 2023-07-06 | End: 2023-07-07 | Stop reason: HOSPADM

## 2023-07-06 RX ORDER — LIDOCAINE HYDROCHLORIDE 10 MG/ML
INJECTION, SOLUTION EPIDURAL; INFILTRATION; INTRACAUDAL; PERINEURAL AS NEEDED
Status: COMPLETED | OUTPATIENT
Start: 2023-07-06 | End: 2023-07-06

## 2023-07-06 RX ORDER — IODIXANOL 320 MG/ML
150 INJECTION, SOLUTION INTRAVASCULAR
Status: COMPLETED | OUTPATIENT
Start: 2023-07-06 | End: 2023-07-06

## 2023-07-06 RX ADMIN — IODIXANOL 150 ML: 320 INJECTION, SOLUTION INTRAVASCULAR at 16:18

## 2023-07-06 RX ADMIN — FENTANYL CITRATE 50 MCG: 50 INJECTION, SOLUTION INTRAMUSCULAR; INTRAVENOUS at 12:24

## 2023-07-06 RX ADMIN — HYDRALAZINE HYDROCHLORIDE 10 MG: 20 INJECTION, SOLUTION INTRAMUSCULAR; INTRAVENOUS at 15:47

## 2023-07-06 RX ADMIN — MIDAZOLAM 1 MG: 1 INJECTION INTRAMUSCULAR; INTRAVENOUS at 12:35

## 2023-07-06 RX ADMIN — FENTANYL CITRATE 50 MCG: 50 INJECTION, SOLUTION INTRAMUSCULAR; INTRAVENOUS at 12:35

## 2023-07-06 RX ADMIN — LIDOCAINE HYDROCHLORIDE 10 ML: 10 INJECTION, SOLUTION EPIDURAL; INFILTRATION; INTRACAUDAL; PERINEURAL at 12:34

## 2023-07-06 RX ADMIN — MIDAZOLAM 1 MG: 1 INJECTION INTRAMUSCULAR; INTRAVENOUS at 12:24

## 2023-07-06 RX ADMIN — SODIUM CHLORIDE 75 ML/HR: 0.9 INJECTION, SOLUTION INTRAVENOUS at 09:49

## 2023-07-06 NOTE — H&P
Interventional Radiology  History and Physical 7/6/2023     Chiara López   1945   [id number]    Assessment/Plan:  Type 2 endoleak, here for arteriogram +/- embolization. Problem List Items Addressed This Visit        Cardiovascular and Mediastinum    Type II endoleak of aortic graft    Relevant Orders    IR AAA endoleak embolization femoral access          Subjective:     Patient ID: Chiara López is a 69 y.o. male. History of Present Illness  69 y M s/p EVAR 1/6/2017, EVAR limb kissing PTA 1/26/2018 and surveillance duplex / CTA with progressive aneurysm sac enlargement. AMBER is occluded at origin. Suspect lumbar leak. Currently doing ok, no significant complaints. Review of Systems   All other systems reviewed and are negative. Past Medical History:   Diagnosis Date   • AAA (abdominal aortic aneurysm) ([address])    • Diabetes mellitus ([address])    • Diverticulosis    • GERD (gastroesophageal reflux disease)    • Hyperlipidemia    • Hypertension    • Sleep apnea     NOT DIAGNOSED        Past Surgical History:   Procedure Laterality Date   • AORTIC VALVE REPLACEMENT     • APPENDECTOMY     • CARDIAC SURGERY     • CORONARY ARTERY BYPASS GRAFT     • LAPAROSCOPIC COLON RESECTION     • LEG SURGERY      [facility] REMOVED FROM LOWER EXTREMITIES   • [place] AAA REPAIR,AORTO-AORTIC TUBE PROSTH N/A 1/6/2017    Procedure: REPAIR ANEURYSM ENDOVASCULAR ABDOMINAL AORTIC  (EVAR);   Surgeon: Ronal Glass MD;  Location: [facility];  Service: Vascular   • [place] 3RD+ ORD SLCTV ABDL PEL/LXTR [facility] N/A 1/26/2018    Procedure: ANGIOGRAM; BILATERAL ILIAC ARTERY BALLOONING;  Surgeon: Ronal Glass MD;  Location: [facility];  Service: Vascular   • VALVE REPLACEMENT          Social History     Tobacco Use   Smoking Status Every Day   • Packs/day: 1.00   • Years: 30.00   • Total pack years: 30.00   • Types: Cigarettes   Smokeless Tobacco Never        Social History     Substance and Sexual Activity   Alcohol Use Yes Comment: SOCIAL        Social History     Substance and Sexual Activity   Drug Use No        No Known Allergies    Current Outpatient Medications   Medication Sig Dispense Refill   • aspirin 81 mg chewable tablet Chew 81 mg daily       • atorvastatin (LIPITOR) 40 mg tablet Take 40 mg by mouth daily       • citalopram (CeleXA) 10 mg tablet Take 10 mg by mouth daily      • JANUMET XR  MG TB24 Take 1 tablet by mouth daily      • lisinopril (ZESTRIL) 10 mg tablet Lisinopril 10 MG Oral Tablet  TAKE 1 TABLET DAILY AS DIRECTED. Quantity: 30;  Refills: 3       [facility];  Started 12-Nov-2015  Active     • oxybutynin (DITROPAN) 5 mg tablet Take 5 mg by mouth 2 (two) times a day      • acetaminophen (TYLENOL) 325 mg tablet Take 1 tablet every 6 hr as needed for pain. (Patient not taking: Reported on 10/28/2021) 30 tablet 0   • hydrochlorothiazide (HYDRODIURIL) 25 mg tablet Take 25 mg by mouth daily  (Patient not taking: Reported on 4/19/2023)     • polyvinyl alcohol (LIQUIFILM TEARS) 1.4 % ophthalmic solution Administer 1 drop to the right eye as needed for dry eyes (Patient not taking: Reported on 8/30/2021) 15 mL 0   • solifenacin (VESICARE) 10 MG tablet Take 1 tablet (10 mg total) by mouth daily (Patient not taking: Reported on 8/30/2021) 90 tablet 3     Current Facility-Administered Medications   Medication Dose Route Frequency Provider Last Rate Last Admin   • sodium chloride 0.9 % infusion  75 mL/hr Intravenous Continuous Noni Reyes DO 75 mL/hr at 07/06/23 0949 75 mL/hr at 07/06/23 0949          Objective:    Vitals:    07/06/23 0927   BP: 157/70   Pulse: (!) 53   Resp: 18   Temp: 97.9 °F (36.6 °C)   TempSrc: Temporal   SpO2: 96%   Weight: 72.6 kg (160 lb)   Height: 5' 9" (1.753 m)        Physical Exam  HENT:      Head: Normocephalic. Eyes:      Pupils: Pupils are equal, round, and reactive to light. Cardiovascular:      Rate and Rhythm: Normal rate.    Pulmonary:      Effort: Pulmonary effort is normal.   Abdominal:      General: Abdomen is flat. Musculoskeletal:         General: Normal range of motion. Neurological:      Mental Status: He is alert and oriented to person, place, and time. Psychiatric:         Mood and Affect: Mood normal.           No results found for: "BNP"   Lab Results   Component Value Date    WBC 7.63 07/06/2023    HGB 15.5 07/06/2023    HCT 43.8 07/06/2023    MCV 88 07/06/2023     (L) 07/06/2023     Lab Results   Component Value Date    INR 0.99 07/06/2023    INR 0.98 06/15/2023    INR 1.04 07/10/2020    PROTIME 13.3 07/06/2023    PROTIME 13.2 06/15/2023    PROTIME 13.0 07/10/2020     Lab Results   Component Value Date    PTT 25 07/10/2020         I have personally reviewed pertinent imaging and laboratory results. Code Status: No Order  Advance Directive and Living Will:      Power of :    POLST:      This text is generated with voice recognition software. There may be translation, syntax,  or grammatical errors. If you have any questions, please contact the dictating provider.

## 2023-07-06 NOTE — BRIEF OP NOTE (RAD/CATH)
IR AAA ENDOLEAK EMBOLIZATION FEMORAL ACCESS  Procedure Note    PATIENT NAME: Arelis Paniagua  : 1945  MRN: [id number]     Pre-op Diagnosis:   1. Type II endoleak of aortic graft      Post-op Diagnosis:   1. Type II endoleak of aortic graft        Surgeon:   Yasmin Foster DO  Assistants:     No qualified resident was available. Estimated Blood Loss: None  Findings:   · R and L CFA 5F sheath accesses, both closed with Vascades  · No definitive filling of the aneurysm sac upon multiple angiograms. · Unable to visualize L3 lumbar arteries. · Bilateral L4 lumbar arteries catheterized, no definite sac filling. Coil embolized R L4 lumbar. At the time of attempted L L4 lumbar artery embolization, artery was found to either be in spasm or occluded.    · SMA arteriogram and arteriogram of [facility] vincent [place] [facility]id showed occlusion of the AMBER origin    Specimens: none    Complications:  none    Anesthesia: conscious sedation and local    Yasmin Foster DO     Date: 2023  Time: 4:02 PM

## 2023-07-06 NOTE — DISCHARGE INSTRUCTIONS
Moderate Sedation   WHAT YOU NEED TO KNOW:   Moderate sedation, or conscious sedation, is medicine used during procedures to help you feel relaxed and calm. You will be awake and able to follow directions without anxiety or pain. You will remember little to none of the procedure. You may feel tired, weak, or unsteady on your feet after you get sedation. You may also have trouble concentrating or short-term memory loss. These symptoms should go away in 24 hours or less. DISCHARGE INSTRUCTIONS:   Call 911 or have someone else call for any of the following: You have sudden trouble breathing. You cannot be woken. Seek care immediately if:   You have a severe headache or dizziness. Your heart is beating faster than usual.  Contact your healthcare provider if:   You have a fever. You have nausea or are vomiting for more than 8 hours after the procedure. Your skin is itchy, swollen, or you have a rash. You have questions or concerns about your condition or care. Self-care:   Have someone stay with you for 24 hours. This person can drive you to errands and help you do things around the house. This person can also watch for problems. Rest and do quiet activities for 24 hours. Do not exercise, ride a bike, or play sports. Stand up slowly to prevent dizziness and falls. Take short walks around the house with another person. Slowly return to your usual activities the next day. Do not drive or use dangerous machines or tools for 24 hours. You may injure yourself or others. Examples include a lawnmower, saw, or drill. Do not return to work for 24 hours if you use dangerous machines or tools for work. Do not make important decisions for 24 hours. For example, do not sign important papers or invest money. Drink liquids as directed. Liquids help flush the sedation medicine out of your body. Ask how much liquid to drink each day and which liquids are best for you.       Eat small, frequent meals to prevent nausea and vomiting. Start with clear liquids such as juice or broth. If you do not vomit after clear liquids, you can eat your usual foods. Do not drink alcohol or take medicines that make you drowsy. This includes medicines that help you sleep and anxiety medicines. Ask your healthcare provider if it is safe for you to take pain medicine. Follow up with your healthcare provider as directed: Write down your questions so you remember to ask them during your visits. © 2017 [facility] Information is for End User's use only and may not be sold, redistributed or otherwise used for commercial purposes. All illustrations and images included in CareNotes® are the copyrighted property of [employer], [employer]. or [employer]. The above information is an  only. It is not intended as medical advice for individual conditions or treatments. Talk to your doctor, nurse or pharmacist before following any medical regimen to see if it is safe and effective for you. Embolization   AMBULATORY CARE:   What you need to know about embolization: Embolization is a procedure to create a clot, or block, in a blood vessel. This stops blood from flowing to the area. The procedure may be used to treat many conditions. It can help stop heavy bleeding (hemorrhage), or prevent an aneurysm from rupturing. An abnormal connection between arteries can be removed. Embolization can stop blood flow to a tumor, such as a uterine fibroid or a cancer tumor. Chemotherapy medicine may be given during an embolization to treat a cancer tumor. This is called chemoembolization. How to prepare for the procedure: Embolization is sometimes done as an emergency procedure. This means you will not have time to prepare. For an embolization that is not an emergency, the following are general guidelines for how to prepare:  Tell your provider about all your allergies.  This includes if you have ever had an allergic reaction to contrast liquid, anesthesia, or antibiotics. You may be told not to eat or drink anything after midnight the night before your procedure. Arrange to have someone drive you home. The person should stay with you to help you and watch for problems that may develop. Give your provider a list of your medicines. Include all medicines and supplements you take. You may need to stop taking blood thinners or aspirin several days before your procedure. This will help decrease your risk for bleeding. Do not stop taking medicines unless your healthcare provider tells you to stop. Your provider will tell you which medicines to take or not take on the day of your procedure. You may need blood tests to check how well your blood clots and to check your kidney function. Depending on the reason for this procedure, you may an MRI, ultrasound, x-ray, or CT scan. These pictures will help your healthcare provider examine the area to be worked on. If you are a woman, tell your provider if you know or think you might be pregnant. You may not be able to have certain tests because they may harm an unborn baby. Your provider may need to take extra precautions for other tests. What will happen during the procedure: You may be given general anesthesia to keep you asleep and pain-free. You may instead be given moderate sedation. This means you will be awake during the procedure, but you should not feel any pain. Your provider will put numbing medicine on your skin where the procedure will be done. A small incision will be made over an artery. A catheter (thin tube) will be guided into the artery. Contrast liquid will be used to help your healthcare provider see your arteries more easily. Your provider will use a type of x-ray that gives a moving picture of the arteries. This will help him or her move the catheter into the right place. The catheter is moved up until it reaches the correct artery. Your provider will put medicine or a material into the artery to slow or stop blood from flowing. This may be a coil, foam, beads, a plug, or liquid. The liquid may also contain material that is larger than blood cells. Your provider will remove the catheter. Pressure will be used to stop any bleeding that happens. The incision area does not need to be closed with stitches. It will be small and close on its own. It will be covered with a bandage to keep it from becoming infected. What to expect after the procedure: You may have pain for a few days. Depending on the reason you had this procedure, you may also have a headache or cramps. You may have pain, bleeding, or bruising where the catheter went into your leg. All of these symptoms are normal and should get better soon. You may be given pain medicine through your IV or a pump. A pump allows you to control when the pain medicine is given. You should expect to stay in the hospital at least overnight. If you had this procedure to treat heavy bleeding, it may take 24 hours to know if the bleeding stopped. Healthcare providers will help you walk around after your procedure. This will help prevent blood clots. Do not get up until healthcare providers say it is okay. They may want you to lie in one position for a certain amount of time. When they say it is okay to walk, they will help you stand and walk safely. Risks of embolization: You may bleed more than expected or develop an infection. The area being treated may be damaged during the procedure. Your artery may be damaged from the catheter, or you may develop a blood clot. Your kidneys may be damaged from the contrast liquid. The material being put into the artery may go to the wrong place. This can stop blood flow to healthy tissue. The procedure may not work, or it may not relieve your symptoms. Call your doctor or specialist if:   You have a fever higher than 100.4°F (38°C).      You have a fever, pain, and nausea that last longer than 3 days. You suddenly have severe abdominal pain. You cannot urinate, or you urinate very little. You have signs of an infection at the catheter site, such as red streaks, pain, or swelling. You have new or worsening pain. You have questions or concerns about your condition or care. Medicines: You may need any of the following:  NSAIDs help decrease swelling and pain or fever. This medicine is available with or without a doctor's order. NSAIDs can cause stomach bleeding or kidney problems in certain people. If you take blood thinner medicine, always ask your healthcare provider if NSAIDs are safe for you. Always read the medicine label and follow directions. Prescription pain medicine may be given. Ask your healthcare provider how to take this medicine safely. Some prescription pain medicines contain acetaminophen. Do not take other medicines that contain acetaminophen without talking to your healthcare provider. Too much acetaminophen may cause liver damage. Prescription pain medicine may cause constipation. Ask your healthcare provider how to prevent or treat constipation. Take your medicine as directed. Contact your healthcare provider if you think your medicine is not helping or if you have side effects. Tell him or her if you are allergic to any medicine. Keep a list of the medicines, vitamins, and herbs you take. Include the amounts, and when and why you take them. Bring the list or the pill bottles to follow-up visits. Carry your medicine list with you in case of an emergency. Self-care:   Rest as needed. Rest and sleep will help your body heal.     Follow your healthcare provider's instructions for activity. He or she will tell you when it is okay to return to your normal activities and to start driving. He or she may want you to wait 1 to 2 weeks to return to work. Care for the catheter site as directed.  It is okay to shower after the procedure. You will only have a small cut in your skin from where the catheter went into your leg. Check the catheter site for signs of infection, including red streaks, pain, and swelling. Treat symptoms of postembolization syndrome. This syndrome is common after an embolization procedure. It usually starts within 72 hours of the procedure and may last a few days. The main symptoms are fever, pain, and nausea. You will probably be able to manage your symptoms at home. Acetaminophen or an NSAID, such as ibuprofen, can reduce a fever and pain. You may need to eat lightly to manage nausea. Drink more liquids for the first week after the procedure to prevent dehydration. WOUND CARE     Remove band aid/ dressing tomorrow. You may shower 24 hours after your procedure. Shower and wash groin area or wrist area gently with soap and water: beginning tomorrow. Rinse and pat Dry. Apply new water seal band aid. Repeat this process for 5 days. If there is any drainage from the puncture site, you should put on a clean bandage. No Powders, creams, lotions or antibiotic ointments for 5 days. No tub baths, hot tubs or swimming for 5 days. Follow up with your doctor or specialist as directed: You may need to have more tests to check if the procedure worked. Write down your questions so you remember to ask them during your visits. © Copyright [employer] Information is for End User's use only and may not be sold, redistributed or otherwise used for commercial purposes. All illustrations and images included in CareNotes® are the copyrighted property of [employer] or [address] [place] [address]  The above information is an  only. It is not intended as medical advice for individual conditions or treatments. Talk to your doctor, nurse or pharmacist before following any medical regimen to see if it is safe and effective for you.

## 2023-07-06 NOTE — SEDATION DOCUMENTATION
AAA endoleak embolization by . Patient tolerated procedure well. Both groins used for access and closed with vascade. Dry dressings placed on site.  Report called to [facility] RAPHAEL STARKS and bed rest start time 1550

## 2023-07-11 ENCOUNTER — TELEPHONE (OUTPATIENT)
Dept: VASCULAR SURGERY | Facility: CLINIC | Age: 78
End: 2023-07-11

## 2023-07-11 NOTE — TELEPHONE ENCOUNTER
----- Message from Liliam Vital MD sent at 7/10/2023  5:17 PM EDT -----  Please schedule OV with me to discuss results to IR intervention and further steps. Non urgent. Thanks. El Jasmine is a 29 year old male presenting with est care and cpx.         Denies known Latex allergy or symptoms of Latex sensitivity.  Medications reviewed and updated.  Tobacco use verified 2/8/2021.  Health Maintenance Due   Topic Date Due   • Varicella Vaccine (1 of 2 - 2-dose childhood series) 05/06/1993   • Depression Screening  05/06/2004   • DTaP/Tdap/Td Vaccine (1 - Tdap) 05/06/2011   • Influenza Vaccine (1) 08/01/2020       Patient would like communication of their results via:        Cell Phone:   Telephone Information:   Mobile [phone]     Okay to leave a message containing results? Yes

## 2023-08-16 ENCOUNTER — OFFICE VISIT (OUTPATIENT)
Dept: VASCULAR SURGERY | Facility: CLINIC | Age: 78
End: 2023-08-16
Payer: COMMERCIAL

## 2023-08-16 VITALS
WEIGHT: 165.4 LBS | HEART RATE: 87 BPM | DIASTOLIC BLOOD PRESSURE: 84 MMHG | BODY MASS INDEX: 24.5 KG/M2 | HEIGHT: 69 IN | SYSTOLIC BLOOD PRESSURE: 138 MMHG | OXYGEN SATURATION: 96 %

## 2023-08-16 DIAGNOSIS — I97.89 TYPE II ENDOLEAK OF AORTIC GRAFT: ICD-10-CM

## 2023-08-16 DIAGNOSIS — I71.43 INFRARENAL ABDOMINAL AORTIC ANEURYSM (AAA) WITHOUT RUPTURE (HCC): Primary | ICD-10-CM

## 2023-08-16 DIAGNOSIS — E11.59 TYPE 2 DIABETES MELLITUS WITH OTHER CIRCULATORY COMPLICATION, WITHOUT LONG-TERM CURRENT USE OF INSULIN (HCC): ICD-10-CM

## 2023-08-16 PROCEDURE — 99214 OFFICE O/P EST MOD 30 MIN: CPT | Performed by: SURGERY

## 2023-08-16 RX ORDER — FLUTICASONE FUROATE, UMECLIDINIUM BROMIDE AND VILANTEROL TRIFENATATE 100; 62.5; 25 UG/1; UG/1; UG/1
POWDER RESPIRATORY (INHALATION)
COMMUNITY
Start: 2023-07-12

## 2023-08-16 NOTE — PROGRESS NOTES
Assessment/Plan:    Type II endoleak of aortic graft  Slow expansion of the AAA due to type II endoleak, recently underwent bilateral femoral access selective catheterization of the lumbar arteries and coiling of the right L4. Imaging reviewed in detail and discussed with interventional radiologist.  Left lumbar artery went into spasm and was unable to coil. At this point I recommend that we obtain noncontrast CT scan in 3 months to evaluate for the effect of the intervention. If the sac size remains stable then I would hold off on any further interventions. If the sac size is expanding we may have to consider direct sac puncture under CAT scan guidance to coil embolize/glue the region of the endoleak. Risks and benefits of endovascular versus open repair were discussed. Due to multiple comorbidities patient  prefers to avoid any major open surgery. We will recheck in 3 months after CT scan. Diagnoses and all orders for this visit:    Infrarenal abdominal aortic aneurysm (AAA) without rupture ([address])  -     CT abdomen pelvis wo contrast; Future    Type II endoleak of aortic graft  -     CT abdomen pelvis wo contrast; Future    Type 2 diabetes mellitus with other circulatory complication, without long-term current use of insulin ([address])    Other orders  -     Trelegy Ellipta 100-62.5-25 MCG/ACT inhaler          Subjective:      Patient ID: Kasi Carpio is a 69 y.o. male. Patient presents s/p IR AAA endoleak embolization femoral access 7/6/23. HPI  Patient underwent endoleak embolization from femoral access. He has pain in the groin access site. Also has occasional pain in the left side of his back. He smokes 1 pack/day.   The following portions of the patient's history were reviewed and updated as appropriate: allergies, current medications, past family history, past medical history, past social history, past surgical history and problem list.    Review of Systems   Gastrointestinal: Positive for abdominal pain. Musculoskeletal: Positive for back pain. Skin: Positive for wound (bilateral groin puncture sites). Neurological: Positive for numbness (bilateral feet). All other systems reviewed and are negative. I have reviewed the review of systems as entered and made appropriate changes as necessary    Objective:      /84 (BP Location: Left arm, Patient Position: Sitting, Cuff Size: Standard)   Pulse 87   Ht 5' 9" (1.753 m)   Wt 75 kg (165 lb 6.4 oz)   SpO2 96%   BMI 24.43 kg/m²          Physical Exam  Constitutional:       Appearance: Normal appearance. Cardiovascular:      Rate and Rhythm: Normal rate and regular rhythm. Comments: Bilateral lower extremity DP and PT are triphasic. 2+ palpable femoral pulses. No evidence of hematoma or pseudoaneurysm in the groin puncture sites. Abdominal:      Palpations: Abdomen is soft. Tenderness: There is no abdominal tenderness. There is no right CVA tenderness, left CVA tenderness, guarding or rebound. Hernia: No hernia is present. Neurological:      Mental Status: He is alert.    Psychiatric:         Mood and Affect: Mood normal.         Behavior: Behavior normal.

## 2023-08-16 NOTE — ASSESSMENT & PLAN NOTE
Slow expansion of the AAA due to type II endoleak, recently underwent bilateral femoral access selective catheterization of the lumbar arteries and coiling of the right L4. Imaging reviewed in detail and discussed with interventional radiologist.  Left lumbar artery went into spasm and was unable to coil. At this point I recommend that we obtain noncontrast CT scan in 3 months to evaluate for the effect of the intervention. If the sac size remains stable then I would hold off on any further interventions. If the sac size is expanding we may have to consider direct sac puncture under CAT scan guidance to coil embolize/glue the region of the endoleak. Risks and benefits of endovascular versus open repair were discussed. Due to multiple comorbidities patient  prefers to avoid any major open surgery. We will recheck in 3 months after CT scan.

## 2023-08-17 NOTE — PATIENT INSTRUCTIONS
Type II endoleak of aortic graft  Slow expansion of the AAA due to type II endoleak, recently underwent bilateral femoral access selective catheterization of the lumbar arteries and coiling of the right L4. Imaging reviewed in detail and discussed with interventional radiologist.  Left lumbar artery went into spasm and was unable to coil. At this point I recommend that we obtain noncontrast CT scan in 3 months to evaluate for the effect of the intervention. If the sac size remains stable then I would hold off on any further interventions. If the sac size is expanding we may have to consider direct sac puncture under CAT scan guidance to coil embolize/glue the region of the endoleak. Risks and benefits of endovascular versus open repair were discussed. Due to multiple comorbidities patient  prefers to avoid any major open surgery. We will recheck in 3 months after CT scan.

## 2023-10-16 ENCOUNTER — HOSPITAL ENCOUNTER (OUTPATIENT)
Dept: CT IMAGING | Facility: HOSPITAL | Age: 78
Discharge: HOME | End: 2023-10-16
Attending: SURGERY
Payer: COMMERCIAL

## 2023-10-16 DIAGNOSIS — I97.89 TYPE II ENDOLEAK OF AORTIC GRAFT: ICD-10-CM

## 2023-10-16 DIAGNOSIS — I71.43 INFRARENAL ABDOMINAL AORTIC ANEURYSM (AAA) WITHOUT RUPTURE (HCC): ICD-10-CM

## 2023-10-16 PROCEDURE — G1004 CDSM NDSC: HCPCS

## 2023-10-16 PROCEDURE — 74176 CT ABD & PELVIS W/O CONTRAST: CPT

## 2023-10-25 DIAGNOSIS — I71.43 INFRARENAL ABDOMINAL AORTIC ANEURYSM (AAA) WITHOUT RUPTURE (HCC): ICD-10-CM

## 2023-10-25 DIAGNOSIS — I97.89 TYPE II ENDOLEAK OF AORTIC GRAFT: ICD-10-CM

## 2023-10-25 NOTE — RESULT ENCOUNTER NOTE
No change in size of AAA over past 6 months compared to April 2023. So we will recheck in 6 months. We will schedule doppler in 6 months.

## 2024-01-12 ENCOUNTER — CONSULT (OUTPATIENT)
Dept: GASTROENTEROLOGY | Facility: AMBULARY SURGERY CENTER | Age: 79
End: 2024-01-12
Payer: COMMERCIAL

## 2024-01-12 VITALS
BODY MASS INDEX: 24.14 KG/M2 | HEART RATE: 74 BPM | SYSTOLIC BLOOD PRESSURE: 142 MMHG | OXYGEN SATURATION: 99 % | DIASTOLIC BLOOD PRESSURE: 78 MMHG | HEIGHT: 69 IN | WEIGHT: 163 LBS

## 2024-01-12 DIAGNOSIS — Z86.010 HISTORY OF COLON POLYPS: Primary | ICD-10-CM

## 2024-01-12 PROBLEM — Z86.0100 HISTORY OF COLON POLYPS: Status: ACTIVE | Noted: 2024-01-12

## 2024-01-12 PROCEDURE — 99214 OFFICE O/P EST MOD 30 MIN: CPT | Performed by: PHYSICIAN ASSISTANT

## 2024-01-12 RX ORDER — FLUTICASONE PROPIONATE AND SALMETEROL 250; 50 UG/1; UG/1
POWDER RESPIRATORY (INHALATION)
COMMUNITY
Start: 2023-11-14

## 2024-01-12 NOTE — PROGRESS NOTES
[facility] - Outpatient Consultation  Brandon Carrizales 78 y.o. male MRN: [id number]  Encounter: [id number]          ASSESSMENT AND PLAN:      #1.  Significant history of colon polyps, patient reports partial colectomy for large precancerous polyps in his 20s and history of colon polyps on at least most of his interval colonoscopies since that time, most recently over 5 years ago.  No alarm symptoms at this time but does appear elevated colorectal cancer risk.    Significantly he is over 76 years old and has significant sized abdominal aortic aneurysm for which he is being followed by vascular surgery      -Will reach out to GI attendings and also send a message to patient's vascular surgeon, regarding whether patient can be cleared for colonoscopy, in the setting of his large AAA.  Risks and benefits of colonoscopy procedure were otherwise discussed in detail with the patient at this time and I answered his questions    -Pending outcome of these discussions we could also consider CT colonoscopy which I also discussed with the patient at this time, although if abnormal findings are elicited then we would need to consider colonoscopy to follow findings in any case    -If he is felt to be a prohibitive risk for endoscopic evaluation, then would  that risks of colonoscopy would outweigh benefits given his age, his risk factors for colon cancer not withstanding, also explained this to the patient at this time  ______________________________________________________________________    HPI: 78-year-old male with history of diabetes and abdominal aortic aneurysm status post femoral access selective catheterization of lumbar arteries and coiling who presents for evaluation, the patient says he was recommended to have colon cancer surveillance done, he reports that in his 20s he had a partial colectomy due to large polyps that were found in his colon, and he had actually had part of his colon  removed prior to that due to injury sustained while in  service.  The patient says that he was receiving colonoscopies on a frequent basis after that for a long time which became farther apart over time, had been noted with colon polyps on his surveillance exams, he was on a 5-year surveillance schedule most recently, with his last colonoscopy having taken place he says slightly over 5 years ago noting polyps.  His AAA appeared to measure 7.0 cm in size as of a CT scan done in mid October.    He says that he has had some mild discomfort in his lower abdomen over the last month but otherwise is bowel movement consistency and frequency, though generally variable in the long-term, has not changed recently.  Denies any rectal bleeding or melena.  Has had some issues with urinary retention for which she was going to see urology in the near future.  He is concerned about his personal history of polyps and says he would like to exclude any underlying polyps at this time if at all possible.      REVIEW OF SYSTEMS:    CONSTITUTIONAL: Denies any fever, chills, rigors, and weight loss.  HEENT: No earache or tinnitus. Denies hearing loss or visual disturbances.  CARDIOVASCULAR: No chest pain or palpitations.   RESPIRATORY: Denies any cough, hemoptysis, shortness of breath or dyspnea on exertion.  GASTROINTESTINAL: As noted in the History of Present Illness.   GENITOURINARY: No problems with urination. Denies any hematuria or dysuria.  NEUROLOGIC: No dizziness or vertigo, denies headaches.   MUSCULOSKELETAL: Denies any muscle or joint pain.   SKIN: Denies skin rashes or itching.   ENDOCRINE: Denies excessive thirst. Denies intolerance to heat or cold.  PSYCHOSOCIAL: Denies depression or anxiety. Denies any recent memory loss.       Historical Information   Past Medical History:   Diagnosis Date    AAA (abdominal aortic aneurysm) (HCC)     Colon polyp     Diabetes mellitus (HCC)     Diverticulosis     GERD  (gastroesophageal reflux disease)     Hyperlipidemia     Hypertension     Sleep apnea     NOT DIAGNOSED     Past Surgical History:   Procedure Laterality Date    AORTIC VALVE REPLACEMENT      APPENDECTOMY      CARDIAC SURGERY      COLONOSCOPY      CORONARY ARTERY BYPASS GRAFT      IR AAA ENDOLEAK EMBOLIZATION FEMORAL ACCESS  07/06/2023    LAPAROSCOPIC COLON RESECTION      LEG SURGERY      SCHRAPNAL REMOVED FROM LOWER EXTREMITIES    FL AAA REPAIR,AORTO-AORTIC TUBE PROSTH N/A 01/06/2017    Procedure: REPAIR ANEURYSM ENDOVASCULAR ABDOMINAL AORTIC  (EVAR);  Surgeon: Rubina Zhao MD;  Location: [facility];  Service: Vascular    FL SLCTV CATHJ 3RD+ ORD SLCTV ABDL PEL/LXTR BRNCH N/A 01/26/2018    Procedure: ANGIOGRAM; BILATERAL ILIAC ARTERY BALLOONING;  Surgeon: Rubina Zhao MD;  Location: [facility];  Service: Vascular    UPPER GASTROINTESTINAL ENDOSCOPY      VALVE REPLACEMENT       Social History   Social History     Substance and Sexual Activity   Alcohol Use Yes    Comment: SOCIAL     Social History     Substance and Sexual Activity   Drug Use No     Social History     Tobacco Use   Smoking Status Every Day    Current packs/day: 1.00    Average packs/day: 1 pack/day for 30.0 years (30.0 ttl pk-yrs)    Types: Cigarettes   Smokeless Tobacco Never     Family History   Problem Relation Age of Onset    Colon cancer Mother     Colon cancer Father        Meds/Allergies       Current Outpatient Medications:     aspirin 81 mg chewable tablet    atorvastatin (LIPITOR) 40 mg tablet    Fluticasone-Salmeterol (Advair) 250-50 mcg/dose inhaler    hydrochlorothiazide (HYDRODIURIL) 25 mg tablet    JANUMET XR  MG TB24    lisinopril (ZESTRIL) 10 mg tablet    oxybutynin (DITROPAN) 5 mg tablet    polyvinyl alcohol (LIQUIFILM TEARS) 1.4 % ophthalmic solution    Trelegy Ellipta 100-62.5-25 MCG/ACT inhaler    acetaminophen (TYLENOL) 325 mg tablet    citalopram (CeleXA) 10 mg tablet    solifenacin (VESICARE) 10 MG tablet    No Known  "Allergies        Objective     Blood pressure 142/78, pulse 74, height 5' 9\" (1.753 m), weight 73.9 kg (163 lb), SpO2 99%. Body mass index is 24.07 kg/m².        PHYSICAL EXAM:      General Appearance:   Alert, cooperative, no distress   HEENT:   Normocephalic, atraumatic, anicteric.     Neck:  Supple, symmetrical, trachea midline   Lungs:   Clear to auscultation bilaterally; no rales, rhonchi or wheezing; respirations unlabored    Heart::   Regular rate and rhythm; no murmur, rub, or gallop.   Abdomen:   Soft, non-tender, non-distended; normal bowel sounds; no masses, no organomegaly    Genitalia:   Deferred    Rectal:   Deferred    Extremities:  No cyanosis, clubbing or edema    Pulses:  2+ and symmetric    Skin:  No jaundice, rashes, or lesions    Lymph nodes:  No palpable cervical lymphadenopathy        Lab Results:   No visits with results within 1 Day(s) from this visit.   Latest known visit with results is:   Hospital Outpatient Visit on 07/06/2023   Component Date Value    Sodium 07/06/2023 139     Potassium 07/06/2023 3.6     Chloride 07/06/2023 107     CO2 07/06/2023 28     ANION GAP 07/06/2023 4     BUN 07/06/2023 16     Creatinine 07/06/2023 0.84     Glucose 07/06/2023 122     Glucose, Fasting 07/06/2023 122 (H)     Calcium 07/06/2023 9.1     eGFR 07/06/2023 85     Protime 07/06/2023 13.3     INR 07/06/2023 0.99     WBC 07/06/2023 7.63     RBC 07/06/2023 4.97     Hemoglobin 07/06/2023 15.5     Hematocrit 07/06/2023 43.8     MCV 07/06/2023 88     MCH 07/06/2023 31.2     MCHC 07/06/2023 35.4     RDW 07/06/2023 12.4     Platelets 07/06/2023 147 (L)     MPV 07/06/2023 9.5          Radiology Results:   No results found.    "

## 2024-01-24 ENCOUNTER — TELEPHONE (OUTPATIENT)
Dept: GASTROENTEROLOGY | Facility: AMBULARY SURGERY CENTER | Age: 79
End: 2024-01-24

## 2024-01-24 NOTE — TELEPHONE ENCOUNTER
I just tried calling the patient and it went straight to voicemail, will try to get in touch with him again later; please reach out to patient and if he would like to discuss with us in detail I can speak with him, but if you get into contact with them first you can advise, I did discuss with his vascular surgeon as well as with Dr. Ferrer and with Dr. Garvey, all are in agreement that colonoscopy would not be first-line for him at this time.  At this time we would recommend following up in a year and reassessing the situation in view of how his AAA is doing at that time; currently risk would outweigh benefit with colonoscopy.    ----- Message from Shabana Talley PA-C sent at 1/18/2024  1:19 PM EST -----  Cologuard would be an option, though he is at high risk of CRC with his history, and would have increased likelihood of a false positive result.  A negative result would provide reassurance for now, though.      Dr. Ferrer also offered Cologuard as an option when I reached out to him - I can contact patient and discuss this with him as our recommendation at this point then.  There would still need to a risk-benefit discussion if he results positive.  Thanks for your help.      ----- Message -----  From: Rubina Zhao MD  Sent: 1/12/2024   6:19 PM EST  To: Shabana Talley PA-C    Is there any other way to screening like cologuard?    ----- Message -----  From: Shabana Talley PA-C  Sent: 1/12/2024   3:44 PM EST  To: Shabana Talley PA-C; Rubina Zhao MD    Hello Dr. Zhao, this is Shabana Talley, one of the PAs with  gastroenterology.  I saw this patient in my office today, he was recommended to have colon cancer surveillance done due to history of colon polyps.    I see he is being followed for abdominal aortic aneurysm, which appears to measure 7 cm in size as of CT scan done in October; just wanted to get your opinion as to whether patient would be at high risk for aneurysmal rupture from  undergoing colonoscopy and if he could be cleared from your standpoint, I spoke with one of my attendings who also recommended touching base with your service.  Appreciate your help, thank you very much.

## 2024-01-26 ENCOUNTER — NURSE TRIAGE (OUTPATIENT)
Age: 79
End: 2024-01-26

## 2024-01-26 NOTE — TELEPHONE ENCOUNTER
"Pt.'s wife calling asking about recommendation for colonoscopy. Advised Shabana Talley advice per note 1/24/2024. She would like after visit summary to home address.    Reason for Disposition   Information only question and nurse able to answer    Answer Assessment - Initial Assessment Questions  1. REASON FOR CALL or QUESTION: \"What is your reason for calling today?\" or \"How can I best help you?\" or \"What question do you have that I can help answer?\"      Pt.'s wife returning call on recommendation for colonoscopy    Protocols used: Information Only Call - No Triage-ADULT-[place]    "

## 2024-01-29 NOTE — TELEPHONE ENCOUNTER
I spoke with patient's wife Katy, I explained to her that we will hold off on colonoscopy for the time being.  He is being planned for a repeat mesenteric duplex study to evaluate his abdominal aortic aneurysm in April; I would recommend he continue follow-up with his vascular specialist for now; if at any point he can be cleared for colonoscopy in the meantime the vascular specialists can notify us (I am copying Dr. Zhao to this message), otherwise we can plan to follow-up in the office in 1 year so he is not lost to follow-up.  She expressed agreement to this approach.  To our schedulers please set reminder for office visit in 1 year, thank you.

## 2024-04-25 ENCOUNTER — HOSPITAL ENCOUNTER (OUTPATIENT)
Dept: NON INVASIVE DIAGNOSTICS | Facility: CLINIC | Age: 79
Discharge: HOME | End: 2024-04-25
Payer: COMMERCIAL

## 2024-04-25 ENCOUNTER — TELEPHONE (OUTPATIENT)
Age: 79
End: 2024-04-25

## 2024-04-25 DIAGNOSIS — I71.43 INFRARENAL ABDOMINAL AORTIC ANEURYSM (AAA) WITHOUT RUPTURE (HCC): ICD-10-CM

## 2024-04-25 DIAGNOSIS — I97.89 TYPE II ENDOLEAK OF AORTIC GRAFT: ICD-10-CM

## 2024-04-25 PROCEDURE — 93923 UPR/LXTR ART STDY 3+ LVLS: CPT

## 2024-04-25 PROCEDURE — 93978 VASCULAR STUDY: CPT

## 2024-04-25 PROCEDURE — 93978 VASCULAR STUDY: CPT | Performed by: SURGERY

## 2024-04-25 NOTE — TELEPHONE ENCOUNTER
Pt was last seen on 8/16/2023 for a type II endoleak of aortic graft. Pt underwent embolization on 7/6/2023. Received call from Patty from the [facility] stating pt had an EVAR duplex done today and pt has an active endoleak. Previously was measured at 6.7 and now its 7.0. She stated pt is asymptomatic. Pt currently does not have a follow up visit scheduled. Please review and advise.

## 2024-04-25 NOTE — TELEPHONE ENCOUNTER
Per Dr. Zhao patient should follow-up in the office with him to review results of study and determine plan from there   Zayda Mejia

## 2024-04-26 NOTE — TELEPHONE ENCOUNTER
Pt returned the call. Informed him of message from STARLA Atkinson. Attempted to look for an appt with Dr. Zhao but do not see anything until 5/21 at the [place] office which is too far for pt. Please call pt back with an appt. Thanks!

## 2024-05-29 ENCOUNTER — OFFICE VISIT (OUTPATIENT)
Dept: VASCULAR SURGERY | Facility: CLINIC | Age: 79
End: 2024-05-29
Payer: COMMERCIAL

## 2024-05-29 ENCOUNTER — TELEPHONE (OUTPATIENT)
Dept: VASCULAR SURGERY | Facility: CLINIC | Age: 79
End: 2024-05-29

## 2024-05-29 VITALS
WEIGHT: 156 LBS | DIASTOLIC BLOOD PRESSURE: 92 MMHG | OXYGEN SATURATION: 95 % | BODY MASS INDEX: 23.11 KG/M2 | HEIGHT: 69 IN | SYSTOLIC BLOOD PRESSURE: 162 MMHG | HEART RATE: 69 BPM

## 2024-05-29 DIAGNOSIS — I97.89 TYPE II ENDOLEAK OF AORTIC GRAFT: Primary | ICD-10-CM

## 2024-05-29 DIAGNOSIS — I71.43 INFRARENAL ABDOMINAL AORTIC ANEURYSM (AAA) WITHOUT RUPTURE (HCC): ICD-10-CM

## 2024-05-29 DIAGNOSIS — Z86.010 HISTORY OF COLON POLYPS: ICD-10-CM

## 2024-05-29 DIAGNOSIS — E11.59 TYPE 2 DIABETES MELLITUS WITH OTHER CIRCULATORY COMPLICATION, WITHOUT LONG-TERM CURRENT USE OF INSULIN (HCC): ICD-10-CM

## 2024-05-29 PROCEDURE — 99214 OFFICE O/P EST MOD 30 MIN: CPT | Performed by: SURGERY

## 2024-05-29 RX ORDER — ALBUTEROL SULFATE 90 UG/1
AEROSOL, METERED RESPIRATORY (INHALATION)
COMMUNITY
Start: 2024-04-22

## 2024-05-29 RX ORDER — KETOCONAZOLE 20 MG/ML
SHAMPOO TOPICAL
COMMUNITY
Start: 2024-02-22

## 2024-05-29 RX ORDER — BUPROPION HYDROCHLORIDE 150 MG/1
150 TABLET ORAL
COMMUNITY
Start: 2024-05-07

## 2024-05-29 RX ORDER — BUSPIRONE HYDROCHLORIDE 10 MG/1
10 TABLET ORAL
COMMUNITY
Start: 2024-05-07

## 2024-05-29 RX ORDER — POLYETHYLENE GLYCOL 3350 17 G
POWDER IN PACKET (EA) ORAL
COMMUNITY
Start: 2024-03-07

## 2024-05-29 NOTE — ASSESSMENT & PLAN NOTE
Lab Results   Component Value Date    HGBA1C 5.8 (H) 02/27/2023     He is well controlled.  He is on Jardiance in addition to janumet.

## 2024-05-29 NOTE — ASSESSMENT & PLAN NOTE
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    Persistent type 2 endoleak in doppler test.

## 2024-05-29 NOTE — PATIENT INSTRUCTIONS
Procedure will be scheduled for treatment of Type 2 endoleak by direct sac puncture, by putting you on your stomach and approach it from the back.      You will need to hold Jardiance (empagliflozin) for 5 days before procedure.    Prior h/o polyp removal   He needs colonoscopy at some point.  Once we finish the aortic procedure, he can wait 3-4 weeks and then plan for colonoscopy if needed.

## 2024-05-29 NOTE — ASSESSMENT & PLAN NOTE
Persistent sac expansion with Type 2 endoleak.  I have personally reviewed the imaging and my independent interpretation is as follows: continued expasion of EVAR sac, now 7 cm with type 2 endoleak.      Prior h/o selective cannulation and coiling of right L4.    He will need direct sac puncture to approach this endoleak.  Will arrange for procedure in IR. Will discuss with Dr. Moreno.  Hold Jardiance (empagliflozin) for 5 days before procedure.    Risks of inadequate treatment and need for future open conversion were discussed.

## 2024-05-29 NOTE — TELEPHONE ENCOUNTER
Patient was seen in the office today with Dr. Zhao and order a consult for IR with Dr. Moreno email was sent to IR       1. Type II endoleak of aortic graft  Assessment & Plan:  Persistent sac expansion with Type 2 endoleak.  I have personally reviewed the imaging and my independent interpretation is as follows: continued expasion of EVAR sac, now 7 cm with type 2 endoleak.       Prior h/o selective cannulation and coiling of right L4.    He will need direct sac puncture to approach this endoleak.  Will arrange for procedure in IR. Will discuss with Dr. Moreno.  Hold Jardiance (empagliflozin) for 5 days before procedure.     Risks of inadequate treatment and need for future open conversion were discussed.  Orders:  -     Ambulatory Referral to Interventional Radiology; Future  2. Type 2 diabetes mellitus with other circulatory complication, without long-term current use of insulin (HCC)  Assessment & Plan:

## 2024-05-29 NOTE — PROGRESS NOTES
Ambulatory Visit  Name: Brandon Carrizales      : 1945      MRN: [id number]  Encounter Provider: Rubina Zhao MD  Encounter Date: 2024   Encounter department: [facility]    Assessment & Plan   1. Type II endoleak of aortic graft  Assessment & Plan:  Persistent sac expansion with Type 2 endoleak.  I have personally reviewed the imaging and my independent interpretation is as follows: continued expasion of EVAR sac, now 7 cm with type 2 endoleak.      Prior h/o selective cannulation and coiling of right L4.    He will need direct sac puncture to approach this endoleak.  Will arrange for procedure in IR. Will discuss with Dr. Moreno.  Hold Jardiance (empagliflozin) for 5 days before procedure.    Risks of inadequate treatment and need for future open conversion were discussed.  Orders:  -     Ambulatory Referral to Interventional Radiology; Future  2. Type 2 diabetes mellitus with other circulatory complication, without long-term current use of insulin (HCC)  Assessment & Plan:    Lab Results   Component Value Date    HGBA1C 5.8 (H) 2023     He is well controlled.  He is on Jardiance in addition to janumet.  3. 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    Persistent type 2 endoleak in doppler test.  4. History of colon polyps  Assessment & Plan:  Prior h/o polyp removal   He needs colonoscopy at some point.  Once we finish the aortic procedure, he can wait 3-4 weeks and then plan for colonoscopy if needed.      History of Present Illness     Brandon Carrizales is a 78 y.o. male who presents for type 2 endoleak.    Patient presents for review of EVAR duplex done 24. He denies abdominal pain and pain after eating. He states he gets low back pain. He also reports  pain with walking in left groin. He is taking ASA 81 mg and Atorvastatin. He is a former smoker.     Review of Systems   Constitutional: Negative.    HENT: Negative.     Eyes: Negative.    Respiratory: Negative.     Cardiovascular: Negative.    Gastrointestinal: Negative.  Negative for abdominal pain.   Endocrine: Negative.    Genitourinary: Negative.    Musculoskeletal:  Positive for back pain.   Skin: Negative.    Allergic/Immunologic: Negative.    Neurological: Negative.    Hematological: Negative.    Psychiatric/Behavioral: Negative.     I have reviewed the ROS as entered and made changes as necessary.    Past Medical History   Past Medical History:   Diagnosis Date   • AAA (abdominal aortic aneurysm) (HCC)    • Colon polyp    • Diabetes mellitus (HCC)    • Diverticulosis    • GERD (gastroesophageal reflux disease)    • Hyperlipidemia    • Hypertension    • Sleep apnea     NOT DIAGNOSED     Past Surgical History:   Procedure Laterality Date   • AORTIC VALVE REPLACEMENT     • APPENDECTOMY     • CARDIAC SURGERY     • COLONOSCOPY     • CORONARY ARTERY BYPASS GRAFT     • IR AAA ENDOLEAK EMBOLIZATION FEMORAL ACCESS  07/06/2023   • LAPAROSCOPIC COLON RESECTION     • LEG SURGERY      SCHRAPNAL REMOVED FROM LOWER EXTREMITIES   • [place] AAA REPAIR,AORTO-AORTIC TUBE PROSTH N/A 01/06/2017    Procedure: REPAIR ANEURYSM ENDOVASCULAR ABDOMINAL AORTIC  (EVAR);  Surgeon: Rubina Zhao MD;  [facility];  Service: Vascular   • WA SLCTV CATHJ 3RD+ ORD SLCTV ABDL PEL/LXTR [facility] N/A 01/26/2018    Procedure: ANGIOGRAM; BILATERAL ILIAC ARTERY BALLOONING;  Surgeon: Rubina Zhao MD;  Location[facility];  Service: Vascular   • UPPER GASTROINTESTINAL ENDOSCOPY     • VALVE REPLACEMENT       Family History   Problem Relation Age of Onset   • Colon cancer Mother    • Colon cancer Father      Current Outpatient Medications on File Prior to Visit   Medication Sig Dispense Refill   • albuterol (PROVENTIL HFA,VENTOLIN HFA) 90 mcg/act  inhaler INHALE 2 PUFFS BY MOUTH FOUR TIMES A DAY AS NEEDED FOR BREATHING     • aspirin 81 mg chewable tablet Chew 81 mg daily       • atorvastatin (LIPITOR) 40 mg tablet Take 40 mg by mouth daily Taking 1/2 tablet - 20 mg     • buPROPion (WELLBUTRIN XL) 150 mg 24 hr tablet 150 mg     • busPIRone (BUSPAR) 10 mg tablet 10 mg     • Empagliflozin 25 MG TABS 12.5 mg     • Fluticasone-Salmeterol (Advair) 250-50 mcg/dose inhaler INHALE 1 PUFF TWO TIMES A DAY AS DIRECTED     • hydrochlorothiazide (HYDRODIURIL) 25 mg tablet Take 25 mg by mouth daily     • JANUMET XR  MG TB24 Take 1 tablet by mouth daily      • ketoconazole (NIZORAL) 2 % shampoo APPLY SHAMPOO TO WET SCALP BEFORE SHOWER EVERY OTHER DAY ASA SCALP TREATMENT. LEAVE ON FOR 5 TO10 MINUTES BEFORE RINSING. ALLOW TO RINSE OVE     • lisinopril (ZESTRIL) 10 mg tablet Lisinopril 10 MG Oral Tablet  TAKE 1 TABLET DAILY AS DIRECTED.   Quantity: 30;  Refills: 3       Rossi Senior PAC;  Started 12-Nov-2015  Active     • metroNIDAZOLE (METROCREAM) 0.75 % cream APPLY EVERY MORNING TO FACE FOR ROSACEA CONTROL     • nicotine polacrilex (COMMIT) 2 MG lozenge DISSOLVE 1 LOZENGE BY MOUTH EVERY TWO HOURS AS NEEDED FOR SMOKING CESSATION (DO NOT SMOKE WHILE USING THE LOZENGE)     • oxybutynin (DITROPAN) 5 mg tablet Take 5 mg by mouth 2 (two) times a day      • tiotropium (SPIRIVA RESPIMAT) 2.5 MCG/ACT AERS inhaler INHALE 2 INHALATIONS (5MCG) BY MOUTH DAILY FOR COPD     • acetaminophen (TYLENOL) 325 mg tablet Take 1 tablet every 6 hr as needed for pain. (Patient not taking: Reported on 1/12/2024) 30 tablet 0   • citalopram (CeleXA) 10 mg tablet Take 10 mg by mouth daily  (Patient not taking: Reported on 1/12/2024)     • polyvinyl alcohol (LIQUIFILM TEARS) 1.4 % ophthalmic solution Administer 1 drop to the right eye as needed for dry eyes (Patient not taking: Reported on 5/29/2024) 15 mL 0   • solifenacin (VESICARE) 10 MG tablet Take 1 tablet (10 mg total) by mouth daily  (Patient not taking: Reported on 8/30/2021) 90 tablet 3   • Trelegy Ellipta 100-62.5-25 MCG/ACT inhaler  (Patient not taking: Reported on 5/29/2024)       No current facility-administered medications on file prior to visit.   No Known Allergies   Current Outpatient Medications on File Prior to Visit   Medication Sig Dispense Refill   • albuterol (PROVENTIL HFA,VENTOLIN HFA) 90 mcg/act inhaler INHALE 2 PUFFS BY MOUTH FOUR TIMES A DAY AS NEEDED FOR BREATHING     • aspirin 81 mg chewable tablet Chew 81 mg daily       • atorvastatin (LIPITOR) 40 mg tablet Take 40 mg by mouth daily Taking 1/2 tablet - 20 mg     • buPROPion (WELLBUTRIN XL) 150 mg 24 hr tablet 150 mg     • busPIRone (BUSPAR) 10 mg tablet 10 mg     • Empagliflozin 25 MG TABS 12.5 mg     • Fluticasone-Salmeterol (Advair) 250-50 mcg/dose inhaler INHALE 1 PUFF TWO TIMES A DAY AS DIRECTED     • hydrochlorothiazide (HYDRODIURIL) 25 mg tablet Take 25 mg by mouth daily     • JANUMET XR  MG TB24 Take 1 tablet by mouth daily      • ketoconazole (NIZORAL) 2 % shampoo APPLY SHAMPOO TO WET SCALP BEFORE SHOWER EVERY OTHER DAY ASA SCALP TREATMENT. LEAVE ON FOR 5 TO10 MINUTES BEFORE RINSING. ALLOW TO RINSE OVE     • lisinopril (ZESTRIL) 10 mg tablet Lisinopril 10 MG Oral Tablet  TAKE 1 TABLET DAILY AS DIRECTED.   Quantity: 30;  Refills: 3       Rossi Senior;  Started 12-Nov-2015  Active     • metroNIDAZOLE (METROCREAM) 0.75 % cream APPLY EVERY MORNING TO FACE FOR ROSACEA CONTROL     • nicotine polacrilex (COMMIT) 2 MG lozenge DISSOLVE 1 LOZENGE BY MOUTH EVERY TWO HOURS AS NEEDED FOR SMOKING CESSATION (DO NOT SMOKE WHILE USING THE LOZENGE)     • oxybutynin (DITROPAN) 5 mg tablet Take 5 mg by mouth 2 (two) times a day      • tiotropium (SPIRIVA RESPIMAT) 2.5 MCG/ACT AERS inhaler INHALE 2 INHALATIONS (5MCG) BY MOUTH DAILY FOR COPD     • acetaminophen (TYLENOL) 325 mg tablet Take 1 tablet every 6 hr as needed for pain. (Patient not taking: Reported on  "1/12/2024) 30 tablet 0   • citalopram (CeleXA) 10 mg tablet Take 10 mg by mouth daily  (Patient not taking: Reported on 1/12/2024)     • polyvinyl alcohol (LIQUIFILM TEARS) 1.4 % ophthalmic solution Administer 1 drop to the right eye as needed for dry eyes (Patient not taking: Reported on 5/29/2024) 15 mL 0   • solifenacin (VESICARE) 10 MG tablet Take 1 tablet (10 mg total) by mouth daily (Patient not taking: Reported on 8/30/2021) 90 tablet 3   • Trelegy Ellipta 100-62.5-25 MCG/ACT inhaler  (Patient not taking: Reported on 5/29/2024)       No current facility-administered medications on file prior to visit.      Social History     Tobacco Use   • Smoking status: Former     Current packs/day: 1.00     Average packs/day: 1 pack/day for 30.0 years (30.0 ttl pk-yrs)     Types: Cigarettes   • Smokeless tobacco: Never   Vaping Use   • Vaping status: Never Used   Substance and Sexual Activity   • Alcohol use: Yes     Comment: SOCIAL   • Drug use: No   • Sexual activity: Never     Objective     /92 (BP Location: Left arm, Patient Position: Sitting, Cuff Size: Standard)   Pulse 69   Ht 5' 9\" (1.753 m)   Wt 70.8 kg (156 lb)   SpO2 95%   BMI 23.04 kg/m²     Physical Exam  Vitals and nursing note reviewed.   Constitutional:       Appearance: Normal appearance.   HENT:      Head: Normocephalic and atraumatic.   Cardiovascular:      Rate and Rhythm: Normal rate and regular rhythm.      Heart sounds: Normal heart sounds.   Pulmonary:      Effort: Pulmonary effort is normal.      Breath sounds: Normal breath sounds.   Abdominal:      General: There is no distension.      Palpations: Abdomen is soft. There is no mass.      Tenderness: There is no abdominal tenderness.      Hernia: No hernia is present.   Musculoskeletal:      Right lower leg: No edema.      Left lower leg: No edema.   Skin:     General: Skin is warm and dry.      Capillary Refill: Capillary refill takes less than 2 seconds.   Neurological:      General: " No focal deficit present.      Mental Status: He is alert and oriented to person, place, and time.   Psychiatric:         Mood and Affect: Mood normal.       Administrative Statements

## 2024-05-29 NOTE — ASSESSMENT & PLAN NOTE
Prior h/o polyp removal   He needs colonoscopy at some point.  Once we finish the aortic procedure, he can wait 3-4 weeks and then plan for colonoscopy if needed.

## 2024-05-30 ENCOUNTER — PREP FOR PROCEDURE (OUTPATIENT)
Dept: INTERVENTIONAL RADIOLOGY/VASCULAR | Facility: CLINIC | Age: 79
End: 2024-05-30

## 2024-05-30 DIAGNOSIS — I97.89 TYPE II ENDOLEAK OF AORTIC GRAFT: Primary | ICD-10-CM

## 2024-08-01 ENCOUNTER — TELEPHONE (OUTPATIENT)
Dept: RADIOLOGY | Facility: HOSPITAL | Age: 79
End: 2024-08-01

## 2024-08-01 RX ORDER — SODIUM CHLORIDE 9 MG/ML
75 INJECTION, SOLUTION INTRAVENOUS CONTINUOUS
Status: CANCELLED | OUTPATIENT
Start: 2024-08-01

## 2024-08-01 NOTE — PRE-PROCEDURE INSTRUCTIONS
Pre-procedure Instructions for Interventional Radiology  [facility]  [address] [address]  INTERVENTIONAL RADIOLOGY [phone]    You are scheduled for a/an AAA endoleak embolization.    On Thursday 8-8-24.    Your tentative arrival time is 7:30am.  Short stay will notify you the day before your procedure with the exact arrival time and the location to arrive.    To prepare for your procedure:  Please arrange for someone to drive you home after the procedure and stay with you until the next morning if you are instructed to do so.  This is typically for patients receiving some type of sedative or anesthetic for the procedure.  DO NOT EAT OR DRINK ANYTHING after midnight on the evening before your procedure including candy & gum.  ONLY SIPS OF WATER with your medications are allowed on the morning of your procedure.  TAKE ALL OF YOUR REGULAR MEDICATIONS THE MORNING OF YOUR PROCEDURE with sips of water!  We may call you to stop some of your blood sugar, blood pressure and blood thinning medications depending on the procedure.  Please take all of these medications unless we instruct you to stop them.  If you have an allergy to x-ray dye, please contact Interventional Radiology for an x-ray dye preparation which usually consists of an oral steroid and Benadryl.    The day of your procedure:  Bring a list of the medications you take at home.  Bring medications you take for breathing problems (such as inhalers), medications for chest pain, or both.  Bring a case for your glasses or contacts.  Bring your insurance card and a form of photo ID.  Please leave all valuables such as credit cards and jewelry at home.  Report to the admitting office to the left of the registration desk in the main lobby at the [facility], Entrance B.  You will then be directed to the Short Stay Center.  While your procedure is being performed, your family may wait in the Radiology Waiting Room on the 1st  floor in Radiology.  if they need to leave, they may provide a number to be called following the procedure.   Be prepared to stay overnight just in case. Sometimes procedures will indicate the need for further observation or treatment.   If you are scheduled for a follow-up visit with the Interventional Radiologist after your procedure, you will be called with a date and time.    Special Instructions (Medications to stop taking before your procedure etc.):  Hold HCTZ, Lisinopril, and Janumet the morning of the procedure.  Hold Jardiance for 5 days before the procedure.  (Per Dr Zhao)  Hold aspirin for 5 days before the procedure.

## 2024-08-05 ENCOUNTER — TELEPHONE (OUTPATIENT)
Age: 79
End: 2024-08-05

## 2024-08-05 NOTE — TELEPHONE ENCOUNTER
Patient's wife called to confirm medication holds for upcoming IR procedure. Relayed information from IR Telephone encounter 8/1, as follows:    Hold HCTZ, Lisinopril, and Janumet the morning of the procedure.  Hold Jardiance for 5 days before the procedure.  (Per Dr Zhao)  Hold aspirin for 5 days before the procedure.    Patient's wife expressed understanding.

## 2024-08-08 ENCOUNTER — ANESTHESIA (OUTPATIENT)
Dept: RADIOLOGY | Facility: HOSPITAL | Age: 79
End: 2024-08-08

## 2024-08-08 ENCOUNTER — ANESTHESIA EVENT (OUTPATIENT)
Dept: RADIOLOGY | Facility: HOSPITAL | Age: 79
End: 2024-08-08

## 2024-08-08 ENCOUNTER — HOSPITAL ENCOUNTER (OUTPATIENT)
Dept: RADIOLOGY | Facility: HOSPITAL | Age: 79
Discharge: HOME | End: 2024-08-08
Attending: RADIOLOGY
Payer: COMMERCIAL

## 2024-08-08 VITALS
DIASTOLIC BLOOD PRESSURE: 79 MMHG | HEIGHT: 69 IN | SYSTOLIC BLOOD PRESSURE: 182 MMHG | TEMPERATURE: 97.8 F | RESPIRATION RATE: 20 BRPM | OXYGEN SATURATION: 99 % | HEART RATE: 63 BPM | BODY MASS INDEX: 23.4 KG/M2 | WEIGHT: 158 LBS

## 2024-08-08 DIAGNOSIS — I97.89 TYPE II ENDOLEAK OF AORTIC GRAFT: ICD-10-CM

## 2024-08-08 LAB
ANION GAP SERPL CALCULATED.3IONS-SCNC: 8 MMOL/L (ref 4–13)
BUN SERPL-MCNC: 18 MG/DL (ref 5–25)
CALCIUM SERPL-MCNC: 9.1 MG/DL (ref 8.4–10.2)
CHLORIDE SERPL-SCNC: 104 MMOL/L (ref 96–108)
CO2 SERPL-SCNC: 30 MMOL/L (ref 21–32)
CREAT SERPL-MCNC: 1 MG/DL (ref 0.6–1.3)
ERYTHROCYTE [DISTWIDTH] IN BLOOD BY AUTOMATED COUNT: 12.5 % (ref 11.6–15.1)
GFR SERPL CREATININE-BSD FRML MDRD: 72 ML/MIN/1.73SQ M
GLUCOSE P FAST SERPL-MCNC: 122 MG/DL (ref 65–99)
GLUCOSE SERPL-MCNC: 122 MG/DL (ref 65–140)
GLUCOSE SERPL-MCNC: 143 MG/DL (ref 65–140)
HCT VFR BLD AUTO: 46.2 % (ref 36.5–49.3)
HGB BLD-MCNC: 15.7 G/DL (ref 12–17)
INR PPP: 0.97 (ref 0.85–1.19)
MCH RBC QN AUTO: 30.7 PG (ref 26.8–34.3)
MCHC RBC AUTO-ENTMCNC: 34 G/DL (ref 31.4–37.4)
MCV RBC AUTO: 90 FL (ref 82–98)
PLATELET # BLD AUTO: 146 THOUSANDS/UL (ref 149–390)
PMV BLD AUTO: 8.9 FL (ref 8.9–12.7)
POTASSIUM SERPL-SCNC: 3.6 MMOL/L (ref 3.5–5.3)
PROTHROMBIN TIME: 13.2 SECONDS (ref 12.3–15)
RBC # BLD AUTO: 5.12 MILLION/UL (ref 3.88–5.62)
SODIUM SERPL-SCNC: 142 MMOL/L (ref 135–147)
WBC # BLD AUTO: 6.77 THOUSAND/UL (ref 4.31–10.16)

## 2024-08-08 PROCEDURE — C1766 INTRO/SHEATH,STRBLE,NON-PEEL: HCPCS

## 2024-08-08 PROCEDURE — 37242 VASC EMBOLIZE/OCCLUDE ARTERY: CPT | Performed by: RADIOLOGY

## 2024-08-08 PROCEDURE — C1769 GUIDE WIRE: HCPCS

## 2024-08-08 PROCEDURE — 85027 COMPLETE CBC AUTOMATED: CPT | Performed by: RADIOLOGY

## 2024-08-08 PROCEDURE — 37242 VASC EMBOLIZE/OCCLUDE ARTERY: CPT

## 2024-08-08 PROCEDURE — 82948 REAGENT STRIP/BLOOD GLUCOSE: CPT

## 2024-08-08 PROCEDURE — 80048 BASIC METABOLIC PNL TOTAL CA: CPT | Performed by: RADIOLOGY

## 2024-08-08 PROCEDURE — C1887 CATHETER, GUIDING: HCPCS

## 2024-08-08 PROCEDURE — 36160 ESTABLISH ACCESS TO AORTA: CPT | Performed by: RADIOLOGY

## 2024-08-08 PROCEDURE — 85610 PROTHROMBIN TIME: CPT | Performed by: RADIOLOGY

## 2024-08-08 PROCEDURE — 75625 CONTRAST EXAM ABDOMINL AORTA: CPT | Performed by: RADIOLOGY

## 2024-08-08 PROCEDURE — C1894 INTRO/SHEATH, NON-LASER: HCPCS

## 2024-08-08 RX ORDER — GLYCOPYRROLATE 0.2 MG/ML
INJECTION INTRAMUSCULAR; INTRAVENOUS AS NEEDED
Status: DISCONTINUED | OUTPATIENT
Start: 2024-08-08 | End: 2024-08-08

## 2024-08-08 RX ORDER — CEFAZOLIN SODIUM 1 G/3ML
INJECTION, POWDER, FOR SOLUTION INTRAMUSCULAR; INTRAVENOUS AS NEEDED
Status: DISCONTINUED | OUTPATIENT
Start: 2024-08-08 | End: 2024-08-08

## 2024-08-08 RX ORDER — PROMETHAZINE HYDROCHLORIDE 25 MG/ML
25 INJECTION, SOLUTION INTRAMUSCULAR; INTRAVENOUS ONCE AS NEEDED
Status: DISCONTINUED | OUTPATIENT
Start: 2024-08-08 | End: 2024-08-08 | Stop reason: HOSPADM

## 2024-08-08 RX ORDER — IODIXANOL 320 MG/ML
100 INJECTION, SOLUTION INTRAVASCULAR
Status: COMPLETED | OUTPATIENT
Start: 2024-08-08 | End: 2024-08-08

## 2024-08-08 RX ORDER — EPHEDRINE SULFATE 50 MG/ML
INJECTION INTRAVENOUS AS NEEDED
Status: DISCONTINUED | OUTPATIENT
Start: 2024-08-08 | End: 2024-08-08

## 2024-08-08 RX ORDER — LABETALOL HYDROCHLORIDE 5 MG/ML
10 INJECTION, SOLUTION INTRAVENOUS
Status: DISCONTINUED | OUTPATIENT
Start: 2024-08-08 | End: 2024-08-09 | Stop reason: HOSPADM

## 2024-08-08 RX ORDER — ONDANSETRON 2 MG/ML
INJECTION INTRAMUSCULAR; INTRAVENOUS AS NEEDED
Status: DISCONTINUED | OUTPATIENT
Start: 2024-08-08 | End: 2024-08-08

## 2024-08-08 RX ORDER — PROPOFOL 10 MG/ML
INJECTION, EMULSION INTRAVENOUS AS NEEDED
Status: DISCONTINUED | OUTPATIENT
Start: 2024-08-08 | End: 2024-08-08

## 2024-08-08 RX ORDER — ONDANSETRON 2 MG/ML
4 INJECTION INTRAMUSCULAR; INTRAVENOUS ONCE AS NEEDED
Status: DISCONTINUED | OUTPATIENT
Start: 2024-08-08 | End: 2024-08-08 | Stop reason: HOSPADM

## 2024-08-08 RX ORDER — LIDOCAINE HYDROCHLORIDE 10 MG/ML
INJECTION, SOLUTION EPIDURAL; INFILTRATION; INTRACAUDAL; PERINEURAL AS NEEDED
Status: DISCONTINUED | OUTPATIENT
Start: 2024-08-08 | End: 2024-08-08

## 2024-08-08 RX ORDER — HYDROMORPHONE HCL/PF 1 MG/ML
0.25 SYRINGE (ML) INJECTION
Status: DISCONTINUED | OUTPATIENT
Start: 2024-08-08 | End: 2024-08-08 | Stop reason: HOSPADM

## 2024-08-08 RX ORDER — NEOSTIGMINE METHYLSULFATE 1 MG/ML
INJECTION INTRAVENOUS AS NEEDED
Status: DISCONTINUED | OUTPATIENT
Start: 2024-08-08 | End: 2024-08-08

## 2024-08-08 RX ORDER — ROCURONIUM BROMIDE 10 MG/ML
INJECTION, SOLUTION INTRAVENOUS AS NEEDED
Status: DISCONTINUED | OUTPATIENT
Start: 2024-08-08 | End: 2024-08-08

## 2024-08-08 RX ORDER — SODIUM CHLORIDE, SODIUM LACTATE, POTASSIUM CHLORIDE, CALCIUM CHLORIDE 600; 310; 30; 20 MG/100ML; MG/100ML; MG/100ML; MG/100ML
INJECTION, SOLUTION INTRAVENOUS CONTINUOUS PRN
Status: DISCONTINUED | OUTPATIENT
Start: 2024-08-08 | End: 2024-08-08

## 2024-08-08 RX ORDER — FENTANYL CITRATE/PF 50 MCG/ML
25 SYRINGE (ML) INJECTION
Status: DISCONTINUED | OUTPATIENT
Start: 2024-08-08 | End: 2024-08-08 | Stop reason: HOSPADM

## 2024-08-08 RX ORDER — SODIUM CHLORIDE 9 MG/ML
INJECTION, SOLUTION INTRAVENOUS CONTINUOUS PRN
Status: DISCONTINUED | OUTPATIENT
Start: 2024-08-08 | End: 2024-08-08

## 2024-08-08 RX ORDER — FENTANYL CITRATE 50 UG/ML
INJECTION, SOLUTION INTRAMUSCULAR; INTRAVENOUS AS NEEDED
Status: DISCONTINUED | OUTPATIENT
Start: 2024-08-08 | End: 2024-08-08

## 2024-08-08 RX ORDER — CEFAZOLIN SODIUM 1 G/50ML
1000 SOLUTION INTRAVENOUS ONCE
Status: DISCONTINUED | OUTPATIENT
Start: 2024-08-08 | End: 2024-08-09 | Stop reason: HOSPADM

## 2024-08-08 RX ORDER — SODIUM CHLORIDE 9 MG/ML
75 INJECTION, SOLUTION INTRAVENOUS CONTINUOUS
Status: DISCONTINUED | OUTPATIENT
Start: 2024-08-08 | End: 2024-08-09 | Stop reason: HOSPADM

## 2024-08-08 RX ORDER — LIDOCAINE WITH 8.4% SOD BICARB 0.9%(10ML)
SYRINGE (ML) INJECTION AS NEEDED
Status: DISCONTINUED | OUTPATIENT
Start: 2024-08-08 | End: 2024-08-09 | Stop reason: HOSPADM

## 2024-08-08 RX ADMIN — NEOSTIGMINE METHYLSULFATE 3 MG: 1 INJECTION INTRAVENOUS at 12:13

## 2024-08-08 RX ADMIN — PROPOFOL 180 MG: 10 INJECTION, EMULSION INTRAVENOUS at 09:21

## 2024-08-08 RX ADMIN — IODIXANOL 30 ML: 320 INJECTION, SOLUTION INTRAVASCULAR at 12:17

## 2024-08-08 RX ADMIN — FENTANYL CITRATE 25 MCG: 50 INJECTION INTRAMUSCULAR; INTRAVENOUS at 14:18

## 2024-08-08 RX ADMIN — ROCURONIUM BROMIDE 50 MG: 50 INJECTION, SOLUTION INTRAVENOUS at 09:21

## 2024-08-08 RX ADMIN — SODIUM CHLORIDE: 0.9 INJECTION, SOLUTION INTRAVENOUS at 09:15

## 2024-08-08 RX ADMIN — CEFAZOLIN 1000 MG: 1 INJECTION, POWDER, FOR SOLUTION INTRAMUSCULAR; INTRAVENOUS at 09:39

## 2024-08-08 RX ADMIN — EPHEDRINE SULFATE 10 MG: 50 INJECTION, SOLUTION INTRAVENOUS at 11:22

## 2024-08-08 RX ADMIN — Medication 10 ML: at 10:24

## 2024-08-08 RX ADMIN — GLYCOPYRROLATE 0.4 MG: 0.2 INJECTION, SOLUTION INTRAMUSCULAR; INTRAVENOUS at 12:13

## 2024-08-08 RX ADMIN — LIDOCAINE HYDROCHLORIDE 50 MG: 10 INJECTION, SOLUTION EPIDURAL; INFILTRATION; INTRACAUDAL at 09:21

## 2024-08-08 RX ADMIN — SODIUM CHLORIDE, SODIUM LACTATE, POTASSIUM CHLORIDE, AND CALCIUM CHLORIDE: .6; .31; .03; .02 INJECTION, SOLUTION INTRAVENOUS at 11:51

## 2024-08-08 RX ADMIN — SODIUM CHLORIDE 75 ML/HR: 0.9 INJECTION, SOLUTION INTRAVENOUS at 07:50

## 2024-08-08 RX ADMIN — FENTANYL CITRATE 100 MCG: 50 INJECTION INTRAMUSCULAR; INTRAVENOUS at 09:21

## 2024-08-08 RX ADMIN — ONDANSETRON 4 MG: 2 INJECTION INTRAMUSCULAR; INTRAVENOUS at 12:12

## 2024-08-08 NOTE — ANESTHESIA PREPROCEDURE EVALUATION
Procedure:  IR AAA ENDOLEAK EMBOLIZATION TRANSLUMBAR ACCESS    Relevant Problems   CARDIO   (+) AAA (abdominal aortic aneurysm) (HCC)   (+) Hyperlipidemia   (+) Hypertension   (+) Type II endoleak of aortic graft      ENDO   (+) Type 2 diabetes mellitus with other circulatory complication, without long-term current use of insulin (HCC)        Physical Exam    Airway    Mallampati score: II  TM Distance: >3 FB  Neck ROM: full     Dental   No notable dental hx     Cardiovascular  Rate: normal, Murmur    Pulmonary  Pulmonary exam normal     Other Findings  Type II endoleak of aortic graft  Assessment & Plan:  Persistent sac expansion with Type 2 endoleak.  I have personally reviewed the imaging and my independent interpretation is as follows: continued expasion of EVAR sac, now 7 cm with type 2 endoleak.       Prior h/o selective cannulation and coiling of right L4.    He will need direct sac puncture to approach this endoleak.  Will arrange for procedure in IR. Will discuss with Dr. Moreno.  Hold Jardiance (empagliflozin) for 5 days before procedure.       10/16/2023 CT Abdomen:    IMPRESSION:     1. Fusiform infrarenal AAA status post aortobiiliac stent graft. Aneurysm sac size measures up to 6.7 x 7.0 cm (AP by transverse), stable.  2. Cholelithiasis.     ECHO 7/11/2022:    ·  Left Ventricle: Left ventricular cavity size is normal. Wall thickness is increased. There is mild concentric hypertrophy. The left ventricular ejection fraction is 60%. Systolic function is normal. Wall motion is normal. Diastolic function is mildly abnormal, consistent with grade I (abnormal) relaxation.  ·  Aortic Valve: There is a 23mm Ferguson Magna Ease Bovine pericardial bioprosthetic valve. There is mild (possibly paravalvular) regurgitation. The gradient recorded across the prosthetic aortic valve is above the expected range, with evidence of at least mild stenosis. The aortic valve peak velocity is 3.22 m/s. The aortic valve mean  gradient is 26 mmHg. The DVI is 0.28. The aortic valve area is 0.94 cm2.  ·  Mitral Valve: There is mild annular calcification.     When compared with prior echocardiogram from 2/8/21,  · LV size and function is unchanged  · Prosthetic aortic valve gradients is elevated, but remains unchanged (mean gradient 26 mm Hg)            Anesthesia Plan  ASA Score- 3     Anesthesia Type- general with ASA Monitors.         Additional Monitors: arterial line.    Airway Plan: ETT.           Plan Factors-Exercise tolerance (METS): >4 METS.    Chart reviewed. EKG reviewed. Imaging results reviewed. Existing labs reviewed. Patient summary reviewed.    Patient is not a current smoker.  Patient did not smoke on day of surgery.            Induction- intravenous.    Postoperative Plan- Plan for postoperative opioid use. Planned trial extubation    Perioperative Resuscitation Plan - Level 1 - Full Code.       Informed Consent- Anesthetic plan and risks discussed with patient.  I personally reviewed this patient with the CRNA. Discussed and agreed on the Anesthesia Plan with the CRNA..

## 2024-08-08 NOTE — H&P
Interventional Radiology  History and Physical 8/8/2024     Brandon Carrizales   1945   [id number]    Assessment/Plan:  Type 2 endoleak, here for endoleak repair.    Problem List Items Addressed This Visit          Cardiovascular and Mediastinum    Type II endoleak of aortic graft    Relevant Orders    IR AAA endoleak embolization translumbar access          Subjective:     Patient ID: Brandon Carrizales is a 78 y.o. male.    History of Present Illness  78 y M w/ h/o EVAR and persistent type 2 endoleak despite R L4 lumbar coil artery embolization. Aneurysm sac enlarged to 7 cm. Currently without complaints.    Review of Systems   All other systems reviewed and are negative.        Past Medical History:   Diagnosis Date    AAA (abdominal aortic aneurysm) (HCC)     Colon polyp     Diabetes mellitus (HCC)     Diverticulosis     GERD (gastroesophageal reflux disease)     Hyperlipidemia     Hypertension     Sleep apnea     NOT DIAGNOSED        Past Surgical History:   Procedure Laterality Date    AORTIC VALVE REPLACEMENT      APPENDECTOMY      CARDIAC SURGERY      COLONOSCOPY      CORONARY ARTERY BYPASS GRAFT      IR AAA ENDOLEAK EMBOLIZATION FEMORAL ACCESS  07/06/2023    LAPAROSCOPIC COLON RESECTION      LEG SURGERY      SCHRAPNAL REMOVED FROM LOWER EXTREMITIES    [place] AAA REPAIR,AORTO-AORTIC TUBE PROSTH N/A 01/06/2017    Procedure: REPAIR ANEURYSM ENDOVASCULAR ABDOMINAL AORTIC  (EVAR);  Surgeon: Rubina Zhao MD;  [facility];  Service: Vascular    ID SLCTV CATHJ 3RD+ ORD SLCTV ABDL PEL/LXTR [facility] N/A 01/26/2018    Procedure: ANGIOGRAM; BILATERAL ILIAC ARTERY BALLOONING;  Surgeon: Rubina Zhao MD;  Location: [facility];  Service: Vascular    UPPER GASTROINTESTINAL ENDOSCOPY      VALVE REPLACEMENT          Social History     Tobacco Use   Smoking Status Former    Current packs/day: 1.00    Average packs/day: 1 pack/day for 30.0 years (30.0 ttl pk-yrs)    Types: Cigarettes   Smokeless Tobacco Never        Social  History     Substance and Sexual Activity   Alcohol Use Yes    Comment: SOCIAL        Social History     Substance and Sexual Activity   Drug Use No        No Known Allergies    Current Outpatient Medications   Medication Sig Dispense Refill    albuterol (PROVENTIL HFA,VENTOLIN HFA) 90 mcg/act inhaler INHALE 2 PUFFS BY MOUTH FOUR TIMES A DAY AS NEEDED FOR BREATHING      aspirin 81 mg chewable tablet Chew 81 mg daily        atorvastatin (LIPITOR) 40 mg tablet Take 40 mg by mouth daily Taking 1/2 tablet - 20 mg      buPROPion (WELLBUTRIN XL) 150 mg 24 hr tablet 150 mg      busPIRone (BUSPAR) 10 mg tablet 10 mg      Empagliflozin 25 MG TABS 12.5 mg      hydrochlorothiazide (HYDRODIURIL) 25 mg tablet Take 25 mg by mouth daily      lisinopril (ZESTRIL) 10 mg tablet Lisinopril 10 MG Oral Tablet  TAKE 1 TABLET DAILY AS DIRECTED.   Quantity: 30;  Refills: 3       Rossi Senior;  Started 12-Nov-2015  Active      metroNIDAZOLE (METROCREAM) 0.75 % cream APPLY EVERY MORNING TO FACE FOR ROSACEA CONTROL      nicotine polacrilex (COMMIT) 2 MG lozenge DISSOLVE 1 LOZENGE BY MOUTH EVERY TWO HOURS AS NEEDED FOR SMOKING CESSATION (DO NOT SMOKE WHILE USING THE LOZENGE)      oxybutynin (DITROPAN) 5 mg tablet Take 5 mg by mouth 2 (two) times a day       tiotropium (SPIRIVA RESPIMAT) 2.5 MCG/ACT AERS inhaler INHALE 2 INHALATIONS (5MCG) BY MOUTH DAILY FOR COPD      acetaminophen (TYLENOL) 325 mg tablet Take 1 tablet every 6 hr as needed for pain. (Patient not taking: Reported on 1/12/2024) 30 tablet 0    ketoconazole (NIZORAL) 2 % shampoo APPLY SHAMPOO TO WET SCALP BEFORE SHOWER EVERY OTHER DAY ASA SCALP TREATMENT. LEAVE ON FOR 5 TO10 MINUTES BEFORE RINSING. ALLOW TO RINSE OVE       Current Facility-Administered Medications   Medication Dose Route Frequency Provider Last Rate Last Admin    ceFAZolin (ANCEF) IVPB (premix in dextrose) 1,000 mg 50 mL  1,000 mg Intravenous Once Yuan Moreno, DO        sodium chloride 0.9 %  "infusion  75 mL/hr Intravenous Continuous Yuan Moreno, DO 75 mL/hr at 08/08/24 0750 75 mL/hr at 08/08/24 0750          Objective:    Vitals:    08/08/24 0730   BP: (!) 188/75   BP Location: Right arm   Pulse: (!) 52   Resp: 16   Temp: 98.1 °F (36.7 °C)   TempSrc: Oral   SpO2: 99%   Weight: 71.7 kg (158 lb)   Height: 5' 9\" (1.753 m)        Physical Exam  Constitutional:       Appearance: Normal appearance.   HENT:      Head: Normocephalic.   Eyes:      Pupils: Pupils are equal, round, and reactive to light.   Cardiovascular:      Rate and Rhythm: Normal rate.   Pulmonary:      Effort: Pulmonary effort is normal.   Abdominal:      General: Abdomen is flat.   Musculoskeletal:         General: Normal range of motion.      Cervical back: Normal range of motion.   Skin:     General: Skin is warm.   Neurological:      Mental Status: He is alert and oriented to person, place, and time.   Psychiatric:         Mood and Affect: Mood normal.           No results found for: \"BNP\"   Lab Results   Component Value Date    WBC 6.77 08/08/2024    HGB 15.7 08/08/2024    HCT 46.2 08/08/2024    MCV 90 08/08/2024     (L) 08/08/2024     Lab Results   Component Value Date    INR 0.97 08/08/2024    INR 0.99 07/06/2023    INR 0.98 06/15/2023    PROTIME 13.2 08/08/2024    PROTIME 13.3 07/06/2023    PROTIME 13.2 06/15/2023     Lab Results   Component Value Date    PTT 25 07/10/2020         I have personally reviewed pertinent imaging and laboratory results.     Code Status: No Order  Advance Directive and Living Will:      Power of :    POLST:      This text is generated with voice recognition software. There may be translation, syntax,  or grammatical errors. If you have any questions, please contact the dictating provider.   "

## 2024-08-08 NOTE — DISCHARGE INSTRUCTIONS
ARTERIOGRAM    WHAT YOU SHOULD KNOW:   An angiogram is a procedure to look at arteries in your body. Arteries are the blood vessels that carry blood from your heart to your body.     AFTER YOU LEAVE:     Self-care:   Limit activity: Rest for the remainder of the day of your procedure.Have some one with you until the next morning. Keep your arm or leg straight as much as possible.Rest as much as possible, sitting lying or reclining. Walk only to go to the bathroom, to bed or to eat. If the angiogram catheter was put in your leg, use the stairs as little as possible. No driving for 24-48 hours. No heavy lifting, >10 lbs. Or strenuous activity for 48 hours.      Keep your wound clean and dry. Remove band aid/ dressing tomorrow. You may shower 24 hours after your procedure. Shower and wash groin area or wrist area gently with soap and water: beginning tomorrow. Rinse and pat Dry. Apply new water seal band aid. Repeat this process for 5 days.  If there is any drainage from the puncture site, you should put on a clean bandage. No Powders, creams, lotions or antibiotic ointments for 5 days.  No tub baths, hot tubs or swimming for 5 days.    Watch for bleeding and bruising: It is normal to have a bruise and soreness where the angiogram catheter went in.  Medication: If your angiogram was performed to treat blockages in your leg arteries, it is strongly recommended that you take both an antiplatelet medication (like aspirin or Plavix) to prevent clotting AND a statin drug (like Lipitor or Crestor), even if you have normal cholesterol. If these drugs are not ordered for you please contact either your Vascular Surgery office or the Interventional Radiology Dept during normal daytime working hours. See Interventional Radiology telephone numbers below.  You Should Have Follow up with the vascular surgeon   call [phone] with questions  Diet:   You may resume your regular diet, Sips of flat soda will help with mild  nausea.  Drink more liquids than usual for the next 24 hours      IMMEDIATELY Contact Interventional Radiology at [phone]  if any of the following occur:  If your bruise gets larger or if you notice any active bleeding. APPLY DIRECT PRESSURE TO THE BLEEDING SITE.   If you notice increased swelling or have increased pain at the puncture site   If you have any numbness or pain in the extremity of the puncture site   If that extremity seems cold or pale.    You have fever greater than 101  Persistent nausea or vomitting    Follow up with your primary healthcare provider  as directed: Write down your questions so you remember to ask them during your visits.    Embolization   AMBULATORY CARE:   What you need to know about embolization: Embolization is a procedure to create a clot, or block, in a blood vessel. This stops blood from flowing to the area. The procedure may be used to treat many conditions. It can help stop heavy bleeding (hemorrhage), or prevent an aneurysm from rupturing. An abnormal connection between arteries can be removed. Embolization can stop blood flow to a tumor, such as a uterine fibroid or a cancer tumor. Chemotherapy medicine may be given during an embolization to treat a cancer tumor. This is called chemoembolization.  How to prepare for the procedure: Embolization is sometimes done as an emergency procedure. This means you will not have time to prepare. For an embolization that is not an emergency, the following are general guidelines for how to prepare:  Tell your provider about all your allergies. This includes if you have ever had an allergic reaction to contrast liquid, anesthesia, or antibiotics. You may be told not to eat or drink anything after midnight the night before your procedure. Arrange to have someone drive you home. The person should stay with you to help you and watch for problems that may develop.     Give your provider a list of your medicines. Include all medicines and  supplements you take. You may need to stop taking blood thinners or aspirin several days before your procedure. This will help decrease your risk for bleeding. Do not stop taking medicines unless your healthcare provider tells you to stop. Your provider will tell you which medicines to take or not take on the day of your procedure.     You may need blood tests to check how well your blood clots and to check your kidney function. Depending on the reason for this procedure, you may an MRI, ultrasound, x-ray, or CT scan. These pictures will help your healthcare provider examine the area to be worked on.     If you are a woman, tell your provider if you know or think you might be pregnant. You may not be able to have certain tests because they may harm an unborn baby. Your provider may need to take extra precautions for other tests.     What will happen during the procedure:   You may be given general anesthesia to keep you asleep and pain-free. You may instead be given moderate sedation. This means you will be awake during the procedure, but you should not feel any pain. Your provider will put numbing medicine on your skin where the procedure will be done. A small incision will be made over an artery. A catheter (thin tube) will be guided into the artery. Contrast liquid will be used to help your healthcare provider see your arteries more easily.     Your provider will use a type of x-ray that gives a moving picture of the arteries. This will help him or her move the catheter into the right place. The catheter is moved up until it reaches the correct artery. Your provider will put medicine or a material into the artery to slow or stop blood from flowing. This may be a coil, foam, beads, a plug, or liquid. The liquid may also contain material that is larger than blood cells.      Your provider will remove the catheter. Pressure will be used to stop any bleeding that happens. The incision area does not need to be closed  with stitches. It will be small and close on its own. It will be covered with a bandage to keep it from becoming infected.     What to expect after the procedure:   You may have pain for a few days. Depending on the reason you had this procedure, you may also have a headache or cramps. You may have pain, bleeding, or bruising where the catheter went into your leg. All of these symptoms are normal and should get better soon. You may be given pain medicine through your IV or a pump. A pump allows you to control when the pain medicine is given.     You should expect to stay in the hospital at least overnight. If you had this procedure to treat heavy bleeding, it may take 24 hours to know if the bleeding stopped.     Healthcare providers will help you walk around after your procedure. This will help prevent blood clots. Do not get up until healthcare providers say it is okay. They may want you to lie in one position for a certain amount of time. When they say it is okay to walk, they will help you stand and walk safely.     Risks of embolization: You may bleed more than expected or develop an infection. The area being treated may be damaged during the procedure. Your artery may be damaged from the catheter, or you may develop a blood clot. Your kidneys may be damaged from the contrast liquid. The material being put into the artery may go to the wrong place. This can stop blood flow to healthy tissue. The procedure may not work, or it may not relieve your symptoms.  Call your doctor or specialist if:   You have a fever higher than 100.4°F (38°C).     You have a fever, pain, and nausea that last longer than 3 days.     You suddenly have severe abdominal pain.     You cannot urinate, or you urinate very little.     You have signs of an infection at the catheter site, such as red streaks, pain, or swelling.     You have new or worsening pain.     You have questions or concerns about your condition or care.     Medicines:  You may need any of the following:  NSAIDs help decrease swelling and pain or fever. This medicine is available with or without a doctor's order. NSAIDs can cause stomach bleeding or kidney problems in certain people. If you take blood thinner medicine, always ask your healthcare provider if NSAIDs are safe for you. Always read the medicine label and follow directions.     Prescription pain medicine may be given. Ask your healthcare provider how to take this medicine safely. Some prescription pain medicines contain acetaminophen. Do not take other medicines that contain acetaminophen without talking to your healthcare provider. Too much acetaminophen may cause liver damage. Prescription pain medicine may cause constipation. Ask your healthcare provider how to prevent or treat constipation.      Take your medicine as directed. Contact your healthcare provider if you think your medicine is not helping or if you have side effects. Tell him or her if you are allergic to any medicine. Keep a list of the medicines, vitamins, and herbs you take. Include the amounts, and when and why you take them. Bring the list or the pill bottles to follow-up visits. Carry your medicine list with you in case of an emergency.     Self-care:   Rest as needed. Rest and sleep will help your body heal.     Follow your healthcare provider's instructions for activity. He or she will tell you when it is okay to return to your normal activities and to start driving. He or she may want you to wait 1 to 2 weeks to return to work.     Care for the catheter site as directed. It is okay to shower after the procedure. You will only have a small cut in your skin from where the catheter went into your leg. Check the catheter site for signs of infection, including red streaks, pain, and swelling.     Treat symptoms of postembolization syndrome. This syndrome is common after an embolization procedure. It usually starts within 72 hours of the procedure and  may last a few days. The main symptoms are fever, pain, and nausea. You will probably be able to manage your symptoms at home. Acetaminophen or an NSAID, such as ibuprofen, can reduce a fever and pain. You may need to eat lightly to manage nausea. Drink more liquids for the first week after the procedure to prevent dehydration.   WOUND CARE     Remove band aid/ dressing tomorrow. You may shower 24 hours after your procedure. Shower and wash groin area or wrist area gently with soap and water: beginning tomorrow. Rinse and pat Dry. Apply new water seal band aid. Repeat this process for 5 days.  If there is any drainage from the puncture site, you should put on a clean bandage. No Powders, creams, lotions or antibiotic ointments for 5 days.  No tub baths, hot tubs or swimming for 5 days.      Follow up with your doctor or specialist as directed: You may need to have more tests to check if the procedure worked. Write down your questions so you remember to ask them during your visits.  © Copyright [employer] 2020 Information is for End User's use only and may not be sold, redistributed or otherwise used for commercial purposes. All illustrations and images included in CareNotes® are the copyrighted property of [employer] or [employer]  The above information is an  only. It is not intended as medical advice for individual conditions or treatments. Talk to your doctor, nurse or pharmacist before following any medical regimen to see if it is safe and effective for you.       Embolization   AMBULATORY CARE:   What you need to know about embolization: Embolization is a procedure to create a clot, or block, in a blood vessel. This stops blood from flowing to the area. The procedure may be used to treat many conditions. It can help stop heavy bleeding (hemorrhage), or prevent an aneurysm from rupturing. An abnormal connection between arteries can be removed. Embolization can stop blood flow to a  tumor, such as a uterine fibroid or a cancer tumor. Chemotherapy medicine may be given during an embolization to treat a cancer tumor. This is called chemoembolization.  How to prepare for the procedure: Embolization is sometimes done as an emergency procedure. This means you will not have time to prepare. For an embolization that is not an emergency, the following are general guidelines for how to prepare:  Tell your provider about all your allergies. This includes if you have ever had an allergic reaction to contrast liquid, anesthesia, or antibiotics. You may be told not to eat or drink anything after midnight the night before your procedure. Arrange to have someone drive you home. The person should stay with you to help you and watch for problems that may develop.     Give your provider a list of your medicines. Include all medicines and supplements you take. You may need to stop taking blood thinners or aspirin several days before your procedure. This will help decrease your risk for bleeding. Do not stop taking medicines unless your healthcare provider tells you to stop. Your provider will tell you which medicines to take or not take on the day of your procedure.     You may need blood tests to check how well your blood clots and to check your kidney function. Depending on the reason for this procedure, you may an MRI, ultrasound, x-ray, or CT scan. These pictures will help your healthcare provider examine the area to be worked on.     If you are a woman, tell your provider if you know or think you might be pregnant. You may not be able to have certain tests because they may harm an unborn baby. Your provider may need to take extra precautions for other tests.     What will happen during the procedure:   You may be given general anesthesia to keep you asleep and pain-free. You may instead be given moderate sedation. This means you will be awake during the procedure, but you should not feel any pain. Your  provider will put numbing medicine on your skin where the procedure will be done. A small incision will be made over an artery. A catheter (thin tube) will be guided into the artery. Contrast liquid will be used to help your healthcare provider see your arteries more easily.     Your provider will use a type of x-ray that gives a moving picture of the arteries. This will help him or her move the catheter into the right place. The catheter is moved up until it reaches the correct artery. Your provider will put medicine or a material into the artery to slow or stop blood from flowing. This may be a coil, foam, beads, a plug, or liquid. The liquid may also contain material that is larger than blood cells.      Your provider will remove the catheter. Pressure will be used to stop any bleeding that happens. The incision area does not need to be closed with stitches. It will be small and close on its own. It will be covered with a bandage to keep it from becoming infected.     What to expect after the procedure:   You may have pain for a few days. Depending on the reason you had this procedure, you may also have a headache or cramps. You may have pain, bleeding, or bruising where the catheter went into your leg. All of these symptoms are normal and should get better soon. You may be given pain medicine through your IV or a pump. A pump allows you to control when the pain medicine is given.     You should expect to stay in the hospital at least overnight. If you had this procedure to treat heavy bleeding, it may take 24 hours to know if the bleeding stopped.     Healthcare providers will help you walk around after your procedure. This will help prevent blood clots. Do not get up until healthcare providers say it is okay. They may want you to lie in one position for a certain amount of time. When they say it is okay to walk, they will help you stand and walk safely.     Risks of embolization: You may bleed more than  expected or develop an infection. The area being treated may be damaged during the procedure. Your artery may be damaged from the catheter, or you may develop a blood clot. Your kidneys may be damaged from the contrast liquid. The material being put into the artery may go to the wrong place. This can stop blood flow to healthy tissue. The procedure may not work, or it may not relieve your symptoms.  Call your doctor or specialist if:   You have a fever higher than 100.4°F (38°C).     You have a fever, pain, and nausea that last longer than 3 days.     You suddenly have severe abdominal pain.     You cannot urinate, or you urinate very little.     You have signs of an infection at the catheter site, such as red streaks, pain, or swelling.     You have new or worsening pain.     You have questions or concerns about your condition or care.     Medicines: You may need any of the following:  NSAIDs help decrease swelling and pain or fever. This medicine is available with or without a doctor's order. NSAIDs can cause stomach bleeding or kidney problems in certain people. If you take blood thinner medicine, always ask your healthcare provider if NSAIDs are safe for you. Always read the medicine label and follow directions.     Prescription pain medicine may be given. Ask your healthcare provider how to take this medicine safely. Some prescription pain medicines contain acetaminophen. Do not take other medicines that contain acetaminophen without talking to your healthcare provider. Too much acetaminophen may cause liver damage. Prescription pain medicine may cause constipation. Ask your healthcare provider how to prevent or treat constipation.      Take your medicine as directed. Contact your healthcare provider if you think your medicine is not helping or if you have side effects. Tell him or her if you are allergic to any medicine. Keep a list of the medicines, vitamins, and herbs you take. Include the amounts, and when  and why you take them. Bring the list or the pill bottles to follow-up visits. Carry your medicine list with you in case of an emergency.     Self-care:   Rest as needed. Rest and sleep will help your body heal.     Follow your healthcare provider's instructions for activity. He or she will tell you when it is okay to return to your normal activities and to start driving. He or she may want you to wait 1 to 2 weeks to return to work.     Care for the catheter site as directed. It is okay to shower after the procedure. You will only have a small cut in your skin from where the catheter went into your leg. Check the catheter site for signs of infection, including red streaks, pain, and swelling.     Treat symptoms of postembolization syndrome. This syndrome is common after an embolization procedure. It usually starts within 72 hours of the procedure and may last a few days. The main symptoms are fever, pain, and nausea. You will probably be able to manage your symptoms at home. Acetaminophen or an NSAID, such as ibuprofen, can reduce a fever and pain. You may need to eat lightly to manage nausea. Drink more liquids for the first week after the procedure to prevent dehydration.   WOUND CARE     Remove band aid/ dressing tomorrow. You may shower 24 hours after your procedure. Shower and wash groin area or wrist area gently with soap and water: beginning tomorrow. Rinse and pat Dry. Apply new water seal band aid. Repeat this process for 5 days.  If there is any drainage from the puncture site, you should put on a clean bandage. No Powders, creams, lotions or antibiotic ointments for 5 days.  No tub baths, hot tubs or swimming for 5 days.      Follow up with your doctor or specialist as directed: You may need to have more tests to check if the procedure worked. Write down your questions so you remember to ask them during your visits.  © Copyright [employer] 2020 Information is for End User's use only and may not be  sold, redistributed or otherwise used for commercial purposes. All illustrations and images included in CareNotes® are the copyrighted property of [employer] or [employer]  The above information is an  only. It is not intended as medical advice for individual conditions or treatments. Talk to your doctor, nurse or pharmacist before following any medical regimen to see if it is safe and effective for you.

## 2024-08-08 NOTE — ANESTHESIA PROCEDURE NOTES
"Arterial Line Insertion    Performed by: Bran Taylor DO  Authorized by: Bran Taylor DO  Consent: Verbal consent obtained. Written consent obtained.  Risks and benefits: risks, benefits and alternatives were discussed  Consent given by: patient  Patient understanding: patient states understanding of the procedure being performed  Patient consent: the patient's understanding of the procedure matches consent given  Procedure consent: procedure consent matches procedure scheduled  Relevant documents: relevant documents present and verified  Test results: test results available and properly labeled  Site marked: the operative site was marked  Radiology Images: Radiology Images displayed and confirmed. If images not available, report reviewed  Patient identity confirmed: verbally with patient and arm band  Time out: Immediately prior to procedure a \"time out\" was called to verify the correct patient, procedure, equipment, support staff and site/side marked as required.  Preparation: Patient was prepped and draped in the usual sterile fashion.  Indications: hemodynamic monitoring  Orientation:  Right  Location: radial artery  Sedation:  Patient sedated: yes    Procedure Details:  Needle gauge: 20  Seldinger technique: Seldinger technique used  Number of attempts: 1    Post-procedure:  Post-procedure: dressing applied  Waveform: good waveform  Post-procedure CNS: normal  Patient tolerance: patient tolerated the procedure well with no immediate complications          "

## 2024-08-08 NOTE — ANESTHESIA POSTPROCEDURE EVALUATION
Post-Op Assessment Note    CV Status:  Stable    Pain management: adequate       Mental Status:  Alert and awake   Hydration Status:  Euvolemic   PONV Controlled:  Controlled   Airway Patency:  Patent     Post Op Vitals Reviewed: Yes    No anethesia notable event occurred.    Staff: CRNA               BP   169/75   Temp      Pulse  66   Resp   9   SpO2   100

## 2024-08-08 NOTE — SEDATION DOCUMENTATION
AAA endoleak repair performed by Dr Mroeno without complication. Patient tolerated procedure well. IR Procedure Bedrest Start Time is 1215 x 2 hours. Patient transported to PACU with IR RN and CRNA, report given bedside.

## 2024-08-08 NOTE — BRIEF OP NOTE (RAD/CATH)
IR AAA ENDOLEAK EMBOLIZATION TRANSLUMBAR ACCESS  Procedure Note    PATIENT NAME: Brandon Carrizales  : 1945  MRN: [id number]     Pre-op Diagnosis:   1. Type II endoleak of aortic graft      Post-op Diagnosis:   1. Type II endoleak of aortic graft        Surgeon:   Yuan Moreno DO    Assistants:     No qualified resident was available.    Estimated Blood Loss: None  Findings: Large endoleak sealed with Lava via L translumbar aortic sac.     Specimens: none    Complications:  none    Anesthesia: local and general    Yuan Moreno DO     Date: 2024  Time: 12:14 PM

## 2024-08-08 NOTE — QUICK NOTE
Interventional Radiology    24 at 1330    [facility]   1945    79 yo male s/p AAA endoleak repair with Lava via Left translumbar aortic sac.     Notified by PACU RN patient with edema at left thoracic lumbar access site.     Patient assessed at bedside.   -Left thoracic lumbar site with  edema but area soft on palpation with evidence of drainage.   -Patient reporting mild pain at site, but no numbness/tingling/weakness of lower extremities.   -B/L LE warm with DP and PT pulses palpable bilaterally.  -Recommended continue bedrest and place HOB flat. -Notified IR Attending. Will reassess in 1 hour    1450 left thoracic lumbar site reassessed. Edema improved. Patient continues to be asymptomatic with extremities warm, pulses palpable bilaterally.     IR Attending updated.    STARLA Gonzalez  Interventional Radiology

## 2024-08-09 ENCOUNTER — TELEPHONE (OUTPATIENT)
Dept: INTERVENTIONAL RADIOLOGY/VASCULAR | Facility: CLINIC | Age: 79
End: 2024-08-09

## 2024-08-09 NOTE — TELEPHONE ENCOUNTER
Spoke with Kevin All by phone today. He is feeling well excluding some mild back tenderness from where the needle went in.     I discussed the procedural findings / outcomes from endoleak repair yesterday and stated we were technically successful and felt pretty good about the procedure. I did mention that we will have to wait for follow up imaging for assessment. He has a follow up appointment with Dr. Zhao later this month.

## 2024-08-28 ENCOUNTER — OFFICE VISIT (OUTPATIENT)
Dept: VASCULAR SURGERY | Facility: CLINIC | Age: 79
End: 2024-08-28
Payer: COMMERCIAL

## 2024-08-28 VITALS
BODY MASS INDEX: 22.66 KG/M2 | HEART RATE: 58 BPM | WEIGHT: 153 LBS | HEIGHT: 69 IN | DIASTOLIC BLOOD PRESSURE: 78 MMHG | SYSTOLIC BLOOD PRESSURE: 150 MMHG

## 2024-08-28 DIAGNOSIS — G89.29 CHRONIC LEFT-SIDED LOW BACK PAIN WITH BILATERAL SCIATICA: Chronic | ICD-10-CM

## 2024-08-28 DIAGNOSIS — M54.42 CHRONIC LEFT-SIDED LOW BACK PAIN WITH BILATERAL SCIATICA: Chronic | ICD-10-CM

## 2024-08-28 DIAGNOSIS — M54.41 CHRONIC LEFT-SIDED LOW BACK PAIN WITH BILATERAL SCIATICA: Chronic | ICD-10-CM

## 2024-08-28 DIAGNOSIS — I97.89 TYPE II ENDOLEAK OF AORTIC GRAFT: Primary | ICD-10-CM

## 2024-08-28 PROCEDURE — 99213 OFFICE O/P EST LOW 20 MIN: CPT | Performed by: SURGERY

## 2024-08-28 NOTE — ASSESSMENT & PLAN NOTE
Treated with lumbar approach glue embolization of sac.  Imaging reviewed with Dr. Moreno from IR.    Will schedule CTA in 3 month to follow up on response.

## 2024-08-28 NOTE — ASSESSMENT & PLAN NOTE
Prior h/o lower back pain 15 years ago.  He did PT for about 1 month with significant imrpovement. Now over past few months he has had worsening lower back pain radiating to the legs.  More on left than right.  His mobility is limited.    The Type II endoleak from aneurysm is treated so he can start with evaluation from the spine PT program.

## 2024-08-28 NOTE — PROGRESS NOTES
Ambulatory Visit  Name: Brandon Carrizales      : 1945      MRN: [id number]  Encounter Provider: Rubina Zhao MD  Encounter Date: 2024   Encounter department: [facility]    Assessment & Plan   1. Type II endoleak of aortic graft  Assessment & Plan:  Treated with lumbar approach glue embolization of sac.  Imaging reviewed with Dr. Moreno from IR.    Will schedule CTA in 3 month to follow up on response.  Orders:  -     CTA abdomen pelvis w wo contrast; Future; Expected date: 2024  -     Basic metabolic panel; Future  2. Chronic left-sided low back pain with bilateral sciatica  Assessment & Plan:  Prior h/o lower back pain 15 years ago.  He did PT for about 1 month with significant imrpovement. Now over past few months he has had worsening lower back pain radiating to the legs.  More on left than right.  His mobility is limited.    The Type II endoleak from aneurysm is treated so he can start with evaluation from the spine PT program.    Orders:  -     Ambulatory Referral to [facility]; Future  -     Basic metabolic panel; Future      History of Present Illness     Brandon Carrizales is a 78 y.o. male who presents for evaluation after procedure was performed to treat the type II endoleak via lumbar access and CT guidance.  Patient has had chronic lower back pain for several months and it continues to radiate down his legs causing him problems with walking.  He also has right hip issues ongoing.  Denies any abdominal pain.    Patient presents s/p endoleak repair done 24 (IR).    Review of Systems   Constitutional: Negative.    HENT: Negative.     Eyes: Negative.    Respiratory: Negative.     Cardiovascular: Negative.    Gastrointestinal: Negative.    Endocrine: Negative.    Genitourinary: Negative.    Musculoskeletal: Negative.    Skin: Negative.    Allergic/Immunologic: Negative.    Neurological: Negative.    Hematological: Negative.    Psychiatric/Behavioral:  Negative.     I have reviewed the review of systems as entered and made appropriate changes as necessary  Past Medical History   Past Medical History:   Diagnosis Date   • AAA (abdominal aortic aneurysm) (HCC)    • Colon polyp    • Diabetes mellitus (HCC)    • Diverticulosis    • GERD (gastroesophageal reflux disease)    • Hyperlipidemia    • Hypertension    • Sleep apnea     NOT DIAGNOSED     Past Surgical History:   Procedure Laterality Date   • AORTIC VALVE REPLACEMENT     • APPENDECTOMY     • CARDIAC SURGERY     • COLONOSCOPY     • CORONARY ARTERY BYPASS GRAFT     • IR AAA ENDOLEAK EMBOLIZATION FEMORAL ACCESS  07/06/2023   • IR AAA ENDOLEAK EMBOLIZATION TRANSLUMBAR ACCESS  8/8/2024   • LAPAROSCOPIC COLON RESECTION     • LEG SURGERY      SCHRAPNAL REMOVED FROM LOWER EXTREMITIES   • TX AAA REPAIR,AORTO-AORTIC TUBE PROSTH N/A 01/06/2017    Procedure: REPAIR ANEURYSM ENDOVASCULAR ABDOMINAL AORTIC  (EVAR);  Surgeon: Rubina Zhao MD;  Location: [facility];  Service: Vascular   • TX SLCTV CATHJ 3RD+ ORD SLCTV ABDL PEL/LXTR BRNCH N/A 01/26/2018    Procedure: ANGIOGRAM; BILATERAL ILIAC ARTERY BALLOONING;  Surgeon: Rubina Zhao MD;  Location: [facility];  Service: Vascular   • UPPER GASTROINTESTINAL ENDOSCOPY     • VALVE REPLACEMENT       Family History   Problem Relation Age of Onset   • Colon cancer Mother    • Colon cancer Father      Current Outpatient Medications on File Prior to Visit   Medication Sig Dispense Refill   • acetaminophen (TYLENOL) 325 mg tablet Take 1 tablet every 6 hr as needed for pain. (Patient not taking: Reported on 1/12/2024) 30 tablet 0   • albuterol (PROVENTIL HFA,VENTOLIN HFA) 90 mcg/act inhaler INHALE 2 PUFFS BY MOUTH FOUR TIMES A DAY AS NEEDED FOR BREATHING     • aspirin 81 mg chewable tablet Chew 81 mg daily       • atorvastatin (LIPITOR) 40 mg tablet Take 40 mg by mouth daily Taking 1/2 tablet - 20 mg     • buPROPion (WELLBUTRIN XL) 150 mg 24 hr tablet 150 mg     • busPIRone (BUSPAR)  "10 mg tablet 10 mg     • Empagliflozin 25 MG TABS 12.5 mg     • hydrochlorothiazide (HYDRODIURIL) 25 mg tablet Take 25 mg by mouth daily     • ketoconazole (NIZORAL) 2 % shampoo APPLY SHAMPOO TO WET SCALP BEFORE SHOWER EVERY OTHER DAY ASA SCALP TREATMENT. LEAVE ON FOR 5 TO10 MINUTES BEFORE RINSING. ALLOW TO RINSE OVE     • lisinopril (ZESTRIL) 10 mg tablet Lisinopril 10 MG Oral Tablet  TAKE 1 TABLET DAILY AS DIRECTED.   Quantity: 30;  Refills: 3       Rossi Senior;  Started 12-Nov-2015  Active     • metroNIDAZOLE (METROCREAM) 0.75 % cream APPLY EVERY MORNING TO FACE FOR ROSACEA CONTROL     • nicotine polacrilex (COMMIT) 2 MG lozenge DISSOLVE 1 LOZENGE BY MOUTH EVERY TWO HOURS AS NEEDED FOR SMOKING CESSATION (DO NOT SMOKE WHILE USING THE LOZENGE)     • oxybutynin (DITROPAN) 5 mg tablet Take 5 mg by mouth 2 (two) times a day      • tiotropium (SPIRIVA RESPIMAT) 2.5 MCG/ACT AERS inhaler INHALE 2 INHALATIONS (5MCG) BY MOUTH DAILY FOR COPD       No current facility-administered medications on file prior to visit.   No Known Allergies   Objective     /78 (BP Location: Left arm, Patient Position: Sitting, Cuff Size: Standard)   Pulse 58   Ht 5' 9\" (1.753 m)   Wt 69.4 kg (153 lb)   BMI 22.59 kg/m²     Physical Exam  Vitals and nursing note reviewed.   Constitutional:       Appearance: Normal appearance.      Comments: Frail appearing   HENT:      Head: Normocephalic and atraumatic.   Cardiovascular:      Rate and Rhythm: Normal rate and regular rhythm.      Pulses: Normal pulses.   Pulmonary:      Effort: Pulmonary effort is normal.   Abdominal:      General: There is no distension.      Tenderness: There is no abdominal tenderness. There is no right CVA tenderness, left CVA tenderness, guarding or rebound.      Hernia: No hernia is present.   Musculoskeletal:      Right lower leg: No edema.      Left lower leg: No edema.   Skin:     General: Skin is warm.      Capillary Refill: Capillary refill takes " less than 2 seconds.   Neurological:      General: No focal deficit present.      Mental Status: He is alert and oriented to person, place, and time.   Psychiatric:         Mood and Affect: Mood normal.         Behavior: Behavior normal.       Administrative Statements

## 2024-09-16 ENCOUNTER — EVALUATION (OUTPATIENT)
Dept: PHYSICAL THERAPY | Facility: REHABILITATION | Age: 79
End: 2024-09-16
Payer: COMMERCIAL

## 2024-09-16 DIAGNOSIS — M54.41 CHRONIC LEFT-SIDED LOW BACK PAIN WITH BILATERAL SCIATICA: Chronic | ICD-10-CM

## 2024-09-16 DIAGNOSIS — G89.29 CHRONIC LEFT-SIDED LOW BACK PAIN WITH BILATERAL SCIATICA: Chronic | ICD-10-CM

## 2024-09-16 DIAGNOSIS — M54.42 CHRONIC LEFT-SIDED LOW BACK PAIN WITH BILATERAL SCIATICA: Chronic | ICD-10-CM

## 2024-09-16 PROCEDURE — 97162 PT EVAL MOD COMPLEX 30 MIN: CPT | Performed by: PHYSICAL THERAPIST

## 2024-09-16 NOTE — PROGRESS NOTES
PT Evaluation     Today's date: 2024  Patient name: Brandon Carrizales  : 1945  MRN: [id number]  Referring provider: Rubina Zhao MD  Dx:   Encounter Diagnosis     ICD-10-CM    1. Chronic left-sided low back pain with bilateral sciatica  M54.41 Ambulatory Referral to [facility]    M54.42     G89.29           Start Time: 1410  Stop Time: 1450  Total time in clinic (min): 40 minutes    Assessment  Impairments: abnormal gait, abnormal muscle firing, abnormal muscle tone, abnormal or restricted ROM, abnormal movement, activity intolerance, impaired physical strength and pain with function    Assessment details: Brandon Carrizales is a 78 y.o. male presenting to outpatient physical therapy on 24 with referral through [facility] spine program for CLBP. Upon evaluation, Brandon demonstrates impaired LS ROM, TS ROM and hip mobility. The listed impairments and functional limitation are effecting Brandon ability to function at prior level. They can continue to benefit from physical therapy services at this times in order to address the above discussed impairments and functional limitation in order to allow for a return to premorbid status.    No HEP provided at this time. Will continue to explore LS rep motions as no DP found upon todays session. He did feel better sitting with LS roll use, educated on use of LS roll at home.     Understanding of Dx/Px/POC: good     Prognosis: good    Plan  Patient would benefit from: skilled PT  Planned modality interventions: thermotherapy: hydrocollator packs    Planned therapy interventions: joint mobilization, manual therapy, ADL training, neuromuscular re-education, home exercise program, therapeutic exercise, therapeutic activities, strengthening, patient education and functional ROM exercises    Frequency: 2x week  Duration in weeks: 8  Treatment plan discussed with: patient        Subjective Evaluation    History of Present Illness  Mechanism of injury: Brandon is a  "78 y.o. male presenting to physical therapy on 24 with referral through the comp spine program for CLBP.  Patient referred from cardiology and has a history of recent AAA endo leak repair. He reports onset of pain \"years\" ago. Symptoms resolved on their own. He states a few months ago he began getting some numbness. States that if he was doing yard work or sat too long he jaycob get LBP (L side) and it would at times travel into his legs. Symptoms are mild he reports at this time. States that he will have symptoms in his L leg, sometimes R leg.     No specific movement or position relieves symptoms. He does not get waking night pain.    Patient symptoms are intermittent  Patient symptoms are localized and referred  Patient denies bowel and bladder changes (denies urinary retention)/saddle numbness      Patient Goals  Patient goals for therapy: decreased pain  Patient goal: improve tolerance with yard work and recreational tasks (golf)  Pain  Current pain rating: 3  At worst pain ratin  Location: see above  Quality: dull ache      Diagnostic Tests  CT scan: normal    Short Term Goals:   1. Patient will be Independent with hep  2. Patient will improve pain with activity by 50%  3. Patient will report GROC 50% or greater      Long Term Goals:   1. Patient will improve FOTO to greater then goal  2. Patient will improve pain with activity to 2/10 or less  3. Patient will continue with HEP independence to allow for decreased future reoccurrence of pain and loss in function  4. Patient will report GROC 75% or greater with yard work and golf      Objective      Myotomes (L/R): normal LE  Dermatome: (pinprick- L/R):  normal LE     Reflexes:  (L/R) L3-4:   1+ b/l     S1:      unable to elicit resp        GAIT: decreased arm swing, trunk rotation      Lumbar  % of normal   Flex. 75   Extn. 25   SG Left -   SG Right -   ROT Left 50   ROT Right 50   Repeated Movements  Standing:  extension=  P LBP, NW        MMT         " AROM          PROM    Hip       L       R        L           R      L     R   Flex.         Extn.         Abd.         Add.         IR.     50% limitation 50% limitation   ER.                  G. Max         G. Med.         Iliop.             Neuro Dynamic Testing:  Slump test: L=  neg   R=   neg    Straight leg raise:   L=   neg   R=   neg                 Segmental mobility:   LS= hypomobile throughout              Precautions: AAA      Manuals                                                                 Neuro Re-Ed                                                                                                        Ther Ex                                                                                                                     Ther Activity                                       Gait Training                                       Modalities

## 2024-09-27 ENCOUNTER — OFFICE VISIT (OUTPATIENT)
Dept: PHYSICAL THERAPY | Facility: REHABILITATION | Age: 79
End: 2024-09-27
Payer: COMMERCIAL

## 2024-09-27 DIAGNOSIS — M54.41 CHRONIC LEFT-SIDED LOW BACK PAIN WITH BILATERAL SCIATICA: Primary | ICD-10-CM

## 2024-09-27 DIAGNOSIS — M54.42 CHRONIC LEFT-SIDED LOW BACK PAIN WITH BILATERAL SCIATICA: Primary | ICD-10-CM

## 2024-09-27 DIAGNOSIS — G89.29 CHRONIC LEFT-SIDED LOW BACK PAIN WITH BILATERAL SCIATICA: Primary | ICD-10-CM

## 2024-09-27 PROCEDURE — 97110 THERAPEUTIC EXERCISES: CPT | Performed by: PHYSICAL THERAPIST

## 2024-09-27 PROCEDURE — 97140 MANUAL THERAPY 1/> REGIONS: CPT | Performed by: PHYSICAL THERAPIST

## 2024-09-27 NOTE — PROGRESS NOTES
"Daily Note     Today's date: 2024  Patient name: Brandon Carrizales  : 1945  MRN: [id number]  Referring provider: Rubina Zhao MD  Dx:   Encounter Diagnosis     ICD-10-CM    1. Chronic left-sided low back pain with bilateral sciatica  M54.41     M54.42     G89.29           Start Time: 0700  Stop Time: 0745  Total time in clinic (min): 45 minutes    Subjective: Patient reports some L sided LBP today. No radiating pain currently.       Objective: See treatment diary below      Assessment: Tolerated treatment well. Patient  with improved hip mobility into ext post session with improved gait mechanics. He did feel he was \"holding back\" when walking as we discussed him performing HEP and then walking to work on the new mobility gained.       Plan: Continue per plan of care. NV add in bridges, standing TKE as tolerated      Precautions: AAA      Manuals             assessment 5'            Knee ext mob G3            R hip LAD AB            Prone quad stretch 5x ea side  10\"            Neuro Re-Ed             Bridge NV            Standing TKE NV                                                                             Ther Ex             VG 3'  DL   L5            EIL (MAXWELL) 15\"  5x            Seated REP TS ext 2x10                                                                             Ther Activity                                       Gait Training                                       Modalities                                            "

## 2024-10-03 ENCOUNTER — OFFICE VISIT (OUTPATIENT)
Dept: PHYSICAL THERAPY | Facility: REHABILITATION | Age: 79
End: 2024-10-03
Payer: COMMERCIAL

## 2024-10-03 DIAGNOSIS — M54.42 CHRONIC LEFT-SIDED LOW BACK PAIN WITH BILATERAL SCIATICA: Primary | ICD-10-CM

## 2024-10-03 DIAGNOSIS — M54.41 CHRONIC LEFT-SIDED LOW BACK PAIN WITH BILATERAL SCIATICA: Primary | ICD-10-CM

## 2024-10-03 DIAGNOSIS — G89.29 CHRONIC LEFT-SIDED LOW BACK PAIN WITH BILATERAL SCIATICA: Primary | ICD-10-CM

## 2024-10-03 PROCEDURE — 97112 NEUROMUSCULAR REEDUCATION: CPT | Performed by: PHYSICAL MEDICINE & REHABILITATION

## 2024-10-03 PROCEDURE — 97110 THERAPEUTIC EXERCISES: CPT | Performed by: PHYSICAL MEDICINE & REHABILITATION

## 2024-10-03 NOTE — PROGRESS NOTES
"Daily Note     Today's date: 10/3/2024  Patient name: Brandon Carrizales  : 1945  MRN: [id number]  Referring provider: Rubina Zhao MD  Dx:   Encounter Diagnosis     ICD-10-CM    1. Chronic left-sided low back pain with bilateral sciatica  M54.41     M54.42     G89.29                      Subjective: Patient reports continued soreness however feels he is noticing increased overall mobility during gait.     Objective: See treatment diary below    Assessment: Tolerated treatment well overall. Patient had some difficulty with allowing passive lumbar extension during MAXWELL. Fair tolerance to prone prop with pillows. Patient would benefit from continued skilled intervention to address remaining deficits. Continue as able nv.     Plan: Continue per plan of care.      Precautions: AAA    Manuals  10/3           assessment 5'            Knee ext mob G3            R hip LAD AB LH           Prone quad stretch 5x ea side  10\"            Neuro Re-Ed  10/3           Bridge NV 2x10           Standing TKE NV nv                                                                            Ther Ex  10/3           VG 3'  DL   L5 5' L5           EIL (MAXWELL) 15\"  5x 5x15\"           Seated REP TS ext 2x10 2x10             Prone prop with pillows, 2'                                                                Ther Activity                                       Gait Training                                       Modalities                                            "

## 2024-10-07 ENCOUNTER — OFFICE VISIT (OUTPATIENT)
Dept: PHYSICAL THERAPY | Facility: REHABILITATION | Age: 79
End: 2024-10-07
Payer: COMMERCIAL

## 2024-10-07 DIAGNOSIS — G89.29 CHRONIC LEFT-SIDED LOW BACK PAIN WITH BILATERAL SCIATICA: Primary | ICD-10-CM

## 2024-10-07 DIAGNOSIS — M54.41 CHRONIC LEFT-SIDED LOW BACK PAIN WITH BILATERAL SCIATICA: Primary | ICD-10-CM

## 2024-10-07 DIAGNOSIS — M54.42 CHRONIC LEFT-SIDED LOW BACK PAIN WITH BILATERAL SCIATICA: Primary | ICD-10-CM

## 2024-10-07 PROCEDURE — 97112 NEUROMUSCULAR REEDUCATION: CPT | Performed by: PHYSICAL THERAPIST

## 2024-10-07 PROCEDURE — 97110 THERAPEUTIC EXERCISES: CPT | Performed by: PHYSICAL THERAPIST

## 2024-10-07 NOTE — PROGRESS NOTES
"Daily Note     Today's date: 10/7/2024  Patient name: Brandon Carrizales  : 1945  MRN: [id number]  Referring provider: Rubina Zhao MD  Dx:   Encounter Diagnosis     ICD-10-CM    1. Chronic left-sided low back pain with bilateral sciatica  M54.41     M54.42     G89.29           Start Time: 0700  Stop Time: 0745  Total time in clinic (min): 45 minutes    Subjective: Patient reports that he does not feel that constant pain in his low back that he did have. Feels that he is walk more free. Does still have back soreness.       Objective: See treatment diary below      Assessment: Tolerated treatment well. Patient demonstrated fatigue post treatment and exhibited good technique with therapeutic exercises. Patient with a good response to anterior chain both UE and LE mobility. Educated on supine jamey stretch at home.       Plan: Continue per plan of care.      Precautions: AAA    Manuals  10/3 10/7          assessment 5'            Knee ext mob G3            R hip LAD AB LH           Prone quad stretch 5x ea side  10\" LH AB          Neuro Re-Ed  10/3 10/7          Bridge NV 2x10 2x10          Standing TKE NV nv                                                                            Ther Ex  10/3 10/7          VG 3'  DL   L5 5' L5           EIL (MAXWELL) 15\"  5x 5x15\" 15\"x5          Seated REP TS ext 2x10 2x10 See below            Prone prop with pillows, 2'            TM   4'          Jamey stretch   10\"x4 ea side          Seated TS ext and rot   15x ext    10x rot ea side          Pec stretch   Corner - 15\"x5          Ther Activity                                       Gait Training                                       Modalities                                              "

## 2024-10-10 ENCOUNTER — OFFICE VISIT (OUTPATIENT)
Dept: PHYSICAL THERAPY | Facility: REHABILITATION | Age: 79
End: 2024-10-10
Payer: COMMERCIAL

## 2024-10-10 DIAGNOSIS — M54.41 CHRONIC LEFT-SIDED LOW BACK PAIN WITH BILATERAL SCIATICA: Primary | ICD-10-CM

## 2024-10-10 DIAGNOSIS — M54.42 CHRONIC LEFT-SIDED LOW BACK PAIN WITH BILATERAL SCIATICA: Primary | ICD-10-CM

## 2024-10-10 DIAGNOSIS — G89.29 CHRONIC LEFT-SIDED LOW BACK PAIN WITH BILATERAL SCIATICA: Primary | ICD-10-CM

## 2024-10-10 PROCEDURE — 97110 THERAPEUTIC EXERCISES: CPT | Performed by: PHYSICAL THERAPIST

## 2024-10-10 PROCEDURE — 97112 NEUROMUSCULAR REEDUCATION: CPT | Performed by: PHYSICAL THERAPIST

## 2024-10-10 NOTE — PROGRESS NOTES
"Daily Note     Today's date: 10/10/2024  Patient name: Brandon Carrizales  : 1945  MRN: [id number]  Referring provider: Rubina Zhao MD  Dx:   Encounter Diagnosis     ICD-10-CM    1. Chronic left-sided low back pain with bilateral sciatica  M54.41     M54.42     G89.29           Start Time: 1430  Stop Time: 1510  Total time in clinic (min): 40 minutes    Subjective: Patient reports that his back pain and groin pain have been better. Exercises help with improving stiffness.       Objective: See treatment diary below      Assessment: Tolerated treatment well. Patient with cues to maintain upright posture when walking. Overall doing well with exercises, educated to continue with HEP.       Plan: Continue per plan of care.      Precautions: AAA    Manuals  10/3 10/7 10/10         assessment 5'            Knee ext mob G3            R hip LAD AB LH           Prone TS PA    G3 mid TS         Prone quad stretch 5x ea side  10\" LH AB AB         Neuro Re-Ed  10/3 10/7          Bridge NV 2x10 2x10 10x  10x on pball  5\" holds         Standing TKE NV nv           Touchdowns    10x                                                             Ther Ex  10/3 10/7 10/10         VG 3'  DL   L5 5' L5           EIL (MAXWELL) 15\"  5x 5x15\" 15\"x5 15\"x5         Seated REP TS ext 2x10 2x10 See below            Prone prop with pillows, 2'            TM   4' 6' 1.5 mph         Jamey stretch   10\"x4 ea side 15\"x5 ea side         Seated TS ext and rot   15x ext    10x rot ea side          Pec stretch   Corner - 15\"x5 Corner -15\"x5         Ther Activity                                       Gait Training                                       Modalities                                                "

## 2024-10-14 ENCOUNTER — OFFICE VISIT (OUTPATIENT)
Dept: PHYSICAL THERAPY | Facility: REHABILITATION | Age: 79
End: 2024-10-14
Payer: COMMERCIAL

## 2024-10-14 DIAGNOSIS — M54.41 CHRONIC LEFT-SIDED LOW BACK PAIN WITH BILATERAL SCIATICA: Primary | ICD-10-CM

## 2024-10-14 DIAGNOSIS — M54.42 CHRONIC LEFT-SIDED LOW BACK PAIN WITH BILATERAL SCIATICA: Primary | ICD-10-CM

## 2024-10-14 DIAGNOSIS — G89.29 CHRONIC LEFT-SIDED LOW BACK PAIN WITH BILATERAL SCIATICA: Primary | ICD-10-CM

## 2024-10-14 PROCEDURE — 97112 NEUROMUSCULAR REEDUCATION: CPT | Performed by: PHYSICAL THERAPIST

## 2024-10-14 PROCEDURE — 97110 THERAPEUTIC EXERCISES: CPT | Performed by: PHYSICAL THERAPIST

## 2024-10-14 NOTE — PROGRESS NOTES
"Daily Note     Today's date: 10/14/2024  Patient name: Brandon Carrizales  : 1945  MRN: [id number]  Referring provider: Rubina Zhao MD  Dx:   Encounter Diagnosis     ICD-10-CM    1. Chronic left-sided low back pain with bilateral sciatica  M54.41     M54.42     G89.29           Start Time: 1730  Stop Time: 1815  Total time in clinic (min): 45 minutes    Subjective: Patient reports doing well, no new updates. LBP still better over the last few weeks.       Objective: See treatment diary below      Assessment: Tolerated treatment well. Patient with cues when performing standing mobility exercise (touchdowns, rows) for scap retraction.       Plan: Continue per plan of care.      Precautions: AAA    Manuals  10/3 10/7 10/10 10/14        assessment 5'            Knee ext mob G3            R hip LAD AB LH           Prone TS PA    G3 mid TS G3 mid TS        Prone quad stretch 5x ea side  10\" LH AB AB AB        Neuro Re-Ed  10/3 10/7          Bridge NV 2x10 2x10 10x  10x on pball  5\" holds 10x  10x on pball  5\" holds        Standing TKE NV nv           Touchdowns    10x 2x10                                                            Ther Ex  10/3 10/7 10/10 10/14        Rows     XS 10#  5\"x10        VG 3'  DL   L5 5' L5           EIL (MAXWELL) 15\"  5x 5x15\" 15\"x5 15\"x5 15\"x5        Seated REP TS ext 2x10 2x10 See below            Prone prop with pillows, 2'            TM   4' 6' 1.5 mph 6' 1.5 mph        Jamey stretch   10\"x4 ea side 15\"x5 ea side 15\"x5  ea side        Seated TS ext and rot   15x ext    10x rot ea side          Pec stretch   Corner - 15\"x5 Corner -15\"x5 Corner 15\"x5        Ther Activity                                       Gait Training                                       Modalities                                                  "

## 2024-10-24 ENCOUNTER — OFFICE VISIT (OUTPATIENT)
Dept: PHYSICAL THERAPY | Facility: REHABILITATION | Age: 79
End: 2024-10-24
Payer: COMMERCIAL

## 2024-10-24 DIAGNOSIS — M54.42 CHRONIC LEFT-SIDED LOW BACK PAIN WITH BILATERAL SCIATICA: Primary | ICD-10-CM

## 2024-10-24 DIAGNOSIS — G89.29 CHRONIC LEFT-SIDED LOW BACK PAIN WITH BILATERAL SCIATICA: Primary | ICD-10-CM

## 2024-10-24 DIAGNOSIS — M54.41 CHRONIC LEFT-SIDED LOW BACK PAIN WITH BILATERAL SCIATICA: Primary | ICD-10-CM

## 2024-10-24 PROCEDURE — 97110 THERAPEUTIC EXERCISES: CPT | Performed by: PHYSICAL MEDICINE & REHABILITATION

## 2024-10-24 PROCEDURE — 97112 NEUROMUSCULAR REEDUCATION: CPT | Performed by: PHYSICAL MEDICINE & REHABILITATION

## 2024-10-24 NOTE — PROGRESS NOTES
"Daily Note     Today's date: 10/24/2024  Patient name: Brandon Carrizales  : 1945  MRN: [id number]  Referring provider: Rubina Zhao MD  Dx:   Encounter Diagnosis     ICD-10-CM    1. Chronic left-sided low back pain with bilateral sciatica  M54.41     M54.42     G89.29                    Subjective: Patient offers no new complaints to begin session. Was able to play golf.     Objective: See treatment diary below    Assessment: Tolerated treatment well. Able to complete all activity with minimal symptom provocation. Positive response to manual therapy. Continue as able nv per patient tolerance.     Plan: Continue per plan of care.      Precautions: AAA    Manuals  10/3 10/7 10/10 10/14 10/24       assessment 5'            Knee ext mob G3            R hip LAD AB LH           Prone TS PA    G3 mid TS G3 mid TS LH       Prone quad stretch 5x ea side  10\" LH AB AB AB LH       Neuro Re-Ed  10/3 10/7   10/24       Bridge NV 2x10 2x10 10x  10x on pball  5\" holds 10x  10x on pball  5\" holds 2x10 on pball 5\" H       Standing TKE NV nv           Touchdowns    10x 2x10 2x10                                                           Ther Ex  10/3 10/7 10/10 10/14 10/24       Rows     XS 10#  5\"x10 XS 10# 3x10       VG 3'  DL   L5 5' L5           EIL (MAXWELL) 15\"  5x 5x15\" 15\"x5 15\"x5 15\"x5 5x15\"       Seated REP TS ext 2x10 2x10 See below            Prone prop with pillows, 2'            TM   4' 6' 1.5 mph 6' 1.5 mph 6' 1.5 mph       Jamey stretch   10\"x4 ea side 15\"x5 ea side 15\"x5  ea side 5x15\" ea       Seated TS ext and rot   15x ext    10x rot ea side          Pec stretch   Corner - 15\"x5 Corner -15\"x5 Corner 15\"x5 5x15\" corner       Ther Activity      Prone hip ext 10x ea                                 Gait Training                                       Modalities                                                  "

## 2024-10-30 ENCOUNTER — OFFICE VISIT (OUTPATIENT)
Dept: PHYSICAL THERAPY | Facility: REHABILITATION | Age: 79
End: 2024-10-30
Payer: COMMERCIAL

## 2024-10-30 DIAGNOSIS — G89.29 CHRONIC LEFT-SIDED LOW BACK PAIN WITH BILATERAL SCIATICA: Primary | ICD-10-CM

## 2024-10-30 DIAGNOSIS — M54.41 CHRONIC LEFT-SIDED LOW BACK PAIN WITH BILATERAL SCIATICA: Primary | ICD-10-CM

## 2024-10-30 DIAGNOSIS — M54.42 CHRONIC LEFT-SIDED LOW BACK PAIN WITH BILATERAL SCIATICA: Primary | ICD-10-CM

## 2024-10-30 PROCEDURE — 97110 THERAPEUTIC EXERCISES: CPT

## 2024-10-30 PROCEDURE — 97112 NEUROMUSCULAR REEDUCATION: CPT

## 2024-10-30 NOTE — PROGRESS NOTES
"Daily Note     Today's date: 10/30/2024  Patient name: Brandon Carrizales  : 1945  MRN: [id number]  Referring provider: Rubina Zhao MD  Dx:   Encounter Diagnosis     ICD-10-CM    1. 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 He is feeling better overall.       Objective: See treatment diary below      Assessment: Tolerated treatment well. Brandon continues to respond well to manuals and self stretching tasks. He continues to present with moderate restriction in the HF/quad, and HS. Incremental gains with MAXWELL, but very limited. Continued PT would be beneficial to improve function.          Plan: Continue per plan of care.       Precautions: AAA    Manuals  10/3 10/7 10/10 10/14 10/24 10/30      assessment 5'            Knee ext mob G3            R hip LAD AB LH           Prone TS PA    G3 mid TS G3 mid TS LH IL      Prone quad stretch 5x ea side  10\" LH AB AB AB LH MB      Neuro Re-Ed  10/3 10/7   10/24 10/30      Bridge NV 2x10 2x10 10x  10x on pball  5\" holds 10x  10x on pball  5\" holds 2x10 on pball 5\" H 2x10 on pball 5\" H      Standing TKE NV nv           Touchdowns    10x 2x10 2x10 2x10                                                          Ther Ex  10/3 10/7 10/10 10/14 10/24 10/30      Rows     XS 10#  5\"x10 XS 10# 3x10 XS 10# 3x10      VG 3'  DL   L5 5' L5           EIL (MAXWELL) 15\"  5x 5x15\" 15\"x5 15\"x5 15\"x5 5x15\" 5x15\"      Seated REP TS ext 2x10 2x10 See below            Prone prop with pillows, 2'            TM   4' 6' 1.5 mph 6' 1.5 mph 6' 1.5 mph 6' 1.5 mph      Jamey stretch   10\"x4 ea side 15\"x5 ea side 15\"x5  ea side 5x15\" ea 15\"x5  ea side      Seated TS ext and rot   15x ext    10x rot ea side          Pec stretch   Corner - 15\"x5 Corner -15\"x5 Corner 15\"x5 5x15\" corner 5x15\" corner      Ther Activity      Prone hip ext 10x ea Prone hip ext 10x ea                 "                Gait Training                                       Modalities

## 2024-10-31 ENCOUNTER — APPOINTMENT (OUTPATIENT)
Dept: LAB | Facility: CLINIC | Age: 79
End: 2024-10-31
Payer: COMMERCIAL

## 2024-10-31 DIAGNOSIS — M54.41 CHRONIC LEFT-SIDED LOW BACK PAIN WITH BILATERAL SCIATICA: Chronic | ICD-10-CM

## 2024-10-31 DIAGNOSIS — I97.89 TYPE II ENDOLEAK OF AORTIC GRAFT: ICD-10-CM

## 2024-10-31 DIAGNOSIS — G89.29 CHRONIC LEFT-SIDED LOW BACK PAIN WITH BILATERAL SCIATICA: Chronic | ICD-10-CM

## 2024-10-31 DIAGNOSIS — M54.42 CHRONIC LEFT-SIDED LOW BACK PAIN WITH BILATERAL SCIATICA: Chronic | ICD-10-CM

## 2024-10-31 LAB
ANION GAP SERPL CALCULATED.3IONS-SCNC: 9 MMOL/L (ref 4–13)
BUN SERPL-MCNC: 21 MG/DL (ref 5–25)
CALCIUM SERPL-MCNC: 9.2 MG/DL (ref 8.4–10.2)
CHLORIDE SERPL-SCNC: 103 MMOL/L (ref 96–108)
CO2 SERPL-SCNC: 29 MMOL/L (ref 21–32)
CREAT SERPL-MCNC: 0.91 MG/DL (ref 0.6–1.3)
GFR SERPL CREATININE-BSD FRML MDRD: 80 ML/MIN/1.73SQ M
GLUCOSE P FAST SERPL-MCNC: 116 MG/DL (ref 65–99)
POTASSIUM SERPL-SCNC: 4 MMOL/L (ref 3.5–5.3)
SODIUM SERPL-SCNC: 141 MMOL/L (ref 135–147)

## 2024-10-31 PROCEDURE — 36415 COLL VENOUS BLD VENIPUNCTURE: CPT

## 2024-10-31 PROCEDURE — 80048 BASIC METABOLIC PNL TOTAL CA: CPT

## 2024-11-06 ENCOUNTER — OFFICE VISIT (OUTPATIENT)
Dept: PHYSICAL THERAPY | Facility: REHABILITATION | Age: 79
End: 2024-11-06
Payer: COMMERCIAL

## 2024-11-06 DIAGNOSIS — M54.42 CHRONIC LEFT-SIDED LOW BACK PAIN WITH BILATERAL SCIATICA: Primary | ICD-10-CM

## 2024-11-06 DIAGNOSIS — G89.29 CHRONIC LEFT-SIDED LOW BACK PAIN WITH BILATERAL SCIATICA: Primary | ICD-10-CM

## 2024-11-06 DIAGNOSIS — M54.41 CHRONIC LEFT-SIDED LOW BACK PAIN WITH BILATERAL SCIATICA: Primary | ICD-10-CM

## 2024-11-06 PROCEDURE — 97110 THERAPEUTIC EXERCISES: CPT | Performed by: PHYSICAL THERAPIST

## 2024-11-06 PROCEDURE — 97112 NEUROMUSCULAR REEDUCATION: CPT | Performed by: PHYSICAL THERAPIST

## 2024-11-06 NOTE — PROGRESS NOTES
"Daily Note     Today's date: 2024  Patient name: Brandon Carrizales  : 1945  MRN: [id number]  Referring provider: Rubina Zhao MD  Dx:   Encounter Diagnosis     ICD-10-CM    1. 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       Objective: See treatment diary below      Assessment: Tolerated treatment well. Patient did well with all exercises today, min cues needed for form (touchdowns).       Plan: Continue per plan of care.      Precautions: AAA    Manuals  10/3 10/7 10/10 10/14 10/24 10/30 11     assessment 5'            Knee ext mob G3            R hip LAD AB LH           Prone TS PA    G3 mid TS G3 mid TS LH MD      Prone quad stretch 5x ea side  10\" LH AB AB AB LH MB      Neuro Re-Ed  10/3 10/7   10/24 10/30 116     Bridge NV 2x10 2x10 10x  10x on pball  5\" holds 10x  10x on pball  5\" holds 2x10 on pball 5\" H 2x10 on pball 5\" H 2x10 on pball 5\" H     Standing TKE NV nv           Touchdowns    10x 2x10 2x10 2x10 2x10                                                         Ther Ex  10/3 10/7 10/10 10/14 10/24 10/30 11/6     Rows     XS 10#  5\"x10 XS 10# 3x10 XS 10# 3x10 XS 10# 3x10     VG 3'  DL   L5 5' L5           EIL (MAXWELL) 15\"  5x 5x15\" 15\"x5 15\"x5 15\"x5 5x15\" 5x15\" 20\"x4     Seated REP TS ext 2x10 2x10 See below     10x       Prone prop with pillows, 2'            TM   4' 6' 1.5 mph 6' 1.5 mph 6' 1.5 mph 6' 1.5 mph 8' 1.5 mph     Jamey stretch   10\"x4 ea side 15\"x5 ea side 15\"x5  ea side 5x15\" ea 15\"x5  ea side 15\"x4 ea side     Seated TS ext and rot   15x ext    10x rot ea side          Pec stretch   Corner - 15\"x5 Corner -15\"x5 Corner 15\"x5 5x15\" corner 5x15\" corner 5x15\" corner     Ther Activity      Prone hip ext 10x ea Prone hip ext 10x ea              "                   Gait Training                                       Modalities

## 2024-11-12 ENCOUNTER — HOSPITAL ENCOUNTER (OUTPATIENT)
Dept: CT IMAGING | Facility: HOSPITAL | Age: 79
Discharge: HOME | End: 2024-11-12
Attending: SURGERY
Payer: COMMERCIAL

## 2024-11-12 DIAGNOSIS — I97.89 TYPE II ENDOLEAK OF AORTIC GRAFT: ICD-10-CM

## 2024-11-12 PROCEDURE — 74174 CTA ABD&PLVS W/CONTRAST: CPT

## 2024-11-12 RX ADMIN — IOHEXOL 100 ML: 350 INJECTION, SOLUTION INTRAVENOUS at 14:24

## 2024-11-14 ENCOUNTER — APPOINTMENT (OUTPATIENT)
Dept: PHYSICAL THERAPY | Facility: REHABILITATION | Age: 79
End: 2024-11-14
Payer: COMMERCIAL

## 2024-11-18 ENCOUNTER — OFFICE VISIT (OUTPATIENT)
Dept: PHYSICAL THERAPY | Facility: REHABILITATION | Age: 79
End: 2024-11-18
Payer: COMMERCIAL

## 2024-11-18 DIAGNOSIS — M54.41 CHRONIC LEFT-SIDED LOW BACK PAIN WITH BILATERAL SCIATICA: Primary | ICD-10-CM

## 2024-11-18 DIAGNOSIS — M54.42 CHRONIC LEFT-SIDED LOW BACK PAIN WITH BILATERAL SCIATICA: Primary | ICD-10-CM

## 2024-11-18 DIAGNOSIS — G89.29 CHRONIC LEFT-SIDED LOW BACK PAIN WITH BILATERAL SCIATICA: Primary | ICD-10-CM

## 2024-11-18 PROCEDURE — 97110 THERAPEUTIC EXERCISES: CPT | Performed by: PHYSICAL THERAPIST

## 2024-11-18 PROCEDURE — 97112 NEUROMUSCULAR REEDUCATION: CPT | Performed by: PHYSICAL THERAPIST

## 2024-11-18 NOTE — PROGRESS NOTES
"Daily Note     Today's date: 2024  Patient name: Brandon Carrizales  : 1945  MRN: [id number]  Referring provider: Rubina Zhao MD  Dx:   Encounter Diagnosis     ICD-10-CM    1. 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      Objective: See treatment diary below      Assessment: Tolerated treatment well. Patient standing more upright vs prior sessions. He did well with overhead band pull apart for both posture and scap strength. Increased walking time to 10 min today with reports of fatigue post.       Plan: Continue per plan of care.      Precautions: AAA    Manuals  10/3 10/7 10/10 10/14 10/24 10/30 11/6 11/18    assessment 5'            Knee ext mob G3            R hip LAD AB LH           Prone TS PA    G3 mid TS G3 mid TS LH UT      Prone quad stretch 5x ea side  10\" LH AB AB AB LH MB      Neuro Re-Ed  10/3 10/7   10/24 10/30 11/6 11/18    Bridge NV 2x10 2x10 10x  10x on pball  5\" holds 10x  10x on pball  5\" holds 2x10 on pball 5\" H 2x10 on pball 5\" H 2x10 on pball 5\" H     Standing TKE NV nv           Touchdowns    10x 2x10 2x10 2x10 2x10     OH pull apart         GTB  20x                                           Ther Ex  10/3 10/7 10/10 10/14 10/24 10/30 11/6 11/18    Rows     XS 10#  5\"x10 XS 10# 3x10 XS 10# 3x10 XS 10# 3x10 XS 10#  30x    VG 3'  DL   L5 5' L5           EIL (MAXWELL) 15\"  5x 5x15\" 15\"x5 15\"x5 15\"x5 5x15\" 5x15\" 20\"x4     Seated TS rot         10x ea side     Seated REP TS ext 2x10 2x10 See below     10x 10x      Prone prop with pillows, 2'            TM   4' 6' 1.5 mph 6' 1.5 mph 6' 1.5 mph 6' 1.5 mph 8' 1.5 mph 10' 1.8 mph    Jamey stretch   10\"x4 ea side 15\"x5 ea side 15\"x5  ea side 5x15\" ea 15\"x5  ea side 15\"x4 ea side     Seated TS ext and rot   15x ext    10x rot ea side        " "  Pec stretch   Corner - 15\"x5 Corner -15\"x5 Corner 15\"x5 5x15\" corner 5x15\" corner 5x15\" corner 10\"x5    Ther Activity      Prone hip ext 10x ea Prone hip ext 10x ea                                Gait Training                                       Modalities                                                        "

## 2024-12-09 ENCOUNTER — OFFICE VISIT (OUTPATIENT)
Dept: PHYSICAL THERAPY | Facility: REHABILITATION | Age: 79
End: 2024-12-09
Payer: COMMERCIAL

## 2024-12-09 DIAGNOSIS — M54.42 CHRONIC LEFT-SIDED LOW BACK PAIN WITH BILATERAL SCIATICA: Primary | ICD-10-CM

## 2024-12-09 DIAGNOSIS — M54.41 CHRONIC LEFT-SIDED LOW BACK PAIN WITH BILATERAL SCIATICA: Primary | ICD-10-CM

## 2024-12-09 DIAGNOSIS — G89.29 CHRONIC LEFT-SIDED LOW BACK PAIN WITH BILATERAL SCIATICA: Primary | ICD-10-CM

## 2024-12-09 PROCEDURE — 97110 THERAPEUTIC EXERCISES: CPT | Performed by: PHYSICAL THERAPIST

## 2024-12-09 NOTE — PROGRESS NOTES
"Re-assessment and Discharge    Today's date: 2024  Patient name: Brandon Carrizales  : 1945  MRN: [id number]  Referring provider: Rubina Zhao MD  Dx:   Encounter Diagnosis     ICD-10-CM    1. 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 year old male who attended physical therapy for 11 treatment sessions regarding CLBP. Patient reports 80% improvement at this time which correlates to improved impairments and functionality.  Patient has shown improvement throughout PT by demonstrating decreased pain, increased range of motion, increased strength, and improved tolerance to activity.  He no longer has constant LBP which he has had for years and states that his exercises for home have helped with that pain.     Will DC PT at this time. Patient is independent with HEP and will continue with TS and LS mobility.   Understanding of Dx/Px/POC: good     Prognosis: good    Plan  Planned modality interventions: thermotherapy: hydrocollator packs    Treatment plan discussed with: patient        Subjective Evaluation    History of Present Illness  Mechanism of injury: Brandon is a 78 y.o. male presenting to physical therapy on 24 with referral through the comp spine program for CLBP.  Patient referred from cardiology and has a history of recent AAA endo leak repair. He reports onset of pain \"years\" ago. Symptoms resolved on their own. He states a few months ago he began getting some numbness. States that if he was doing yard work or sat too long he jaycob get LBP (L side) and it would at times travel into his legs. Symptoms are mild he " reports at this time. States that he will have symptoms in his L leg, sometimes R leg.     No specific movement or position relieves symptoms. He does not get waking night pain.    Patient symptoms are intermittent  Patient symptoms are localized and referred  Patient denies bowel and bladder changes (denies urinary retention)/saddle numbness      Patient Goals  Patient goals for therapy: decreased pain  Patient goal: improve tolerance with yard work and recreational tasks (golf)  Pain  Current pain rating: 3  At worst pain ratin  Location: see above  Quality: dull ache      Diagnostic Tests  CT scan: normal    Short Term Goals:   1. Patient will be Independent with hep  2. Patient will improve pain with activity by 50%  3. Patient will report GROC 50% or greater    MET      Long Term Goals:   1. Patient will improve FOTO to greater then goal  2. Patient will improve pain with activity to 2/10 or less  3. Patient will continue with HEP independence to allow for decreased future reoccurrence of pain and loss in function  4. Patient will report GROC 75% or greater with yard work and golf    MET      Objective      Myotomes (L/R): normal LE  Dermatome: (pinprick- L/R):  normal LE     Reflexes:  (L/R) L3-4:   1+ b/l     S1:      unable to elicit resp        GAIT: decreased arm swing, trunk rotation      Lumbar  % of normal   Flex. 75   Extn. 25   SG Left -   SG Right -   ROT Left 50   ROT Right 50   Repeated Movements  Standing:  extension=  P LBP, NW        MMT         AROM          PROM    Hip       L       R        L           R      L     R   Flex.         Extn.         Abd.         Add.         IR.     10 deg 10 deg   ER.                  G. Max         G. Med.         Iliop.             Neuro Dynamic Testing:  Slump test: L=  neg   R=   neg    Straight leg raise:   L=   neg   R=   neg                 Segmental mobility:   LS= hypomobile throughout              Precautions: AAA      Manuals                                                                  Neuro Re-Ed                                                                                                        Ther Ex             assessment 10'            HEP review 15'            REP hip IR 10x ea side in chair, 5x ea side supine                                                                             Ther Activity                                       Gait Training                                       Modalities

## 2025-01-15 ENCOUNTER — OFFICE VISIT (OUTPATIENT)
Dept: VASCULAR SURGERY | Facility: CLINIC | Age: 80
End: 2025-01-15
Payer: COMMERCIAL

## 2025-01-15 VITALS
HEART RATE: 59 BPM | OXYGEN SATURATION: 98 % | BODY MASS INDEX: 22.66 KG/M2 | HEIGHT: 69 IN | RESPIRATION RATE: 18 BRPM | SYSTOLIC BLOOD PRESSURE: 152 MMHG | DIASTOLIC BLOOD PRESSURE: 78 MMHG | WEIGHT: 153 LBS

## 2025-01-15 DIAGNOSIS — E11.59 TYPE 2 DIABETES MELLITUS WITH OTHER CIRCULATORY COMPLICATION, WITHOUT LONG-TERM CURRENT USE OF INSULIN (HCC): ICD-10-CM

## 2025-01-15 DIAGNOSIS — I97.89 TYPE II ENDOLEAK OF AORTIC GRAFT: ICD-10-CM

## 2025-01-15 DIAGNOSIS — I71.40 ABDOMINAL AORTIC ANEURYSM (AAA) WITHOUT RUPTURE, UNSPECIFIED PART (HCC): Primary | ICD-10-CM

## 2025-01-15 PROCEDURE — 99214 OFFICE O/P EST MOD 30 MIN: CPT | Performed by: SURGERY

## 2025-01-15 NOTE — ASSESSMENT & PLAN NOTE
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  Type 2 endoleak trans lumbar embolization in Aug 2024 with LAVA    He is doing well overall. Some non specific left lower quadrant symptoms. No tenderness over the aorta aneurysm area.    CTA reviwed, very small endoleak in lumbar region, will get repeat non contrast CT scan to check on any size expansion.      Orders:  •  CT abdomen pelvis wo contrast; Future

## 2025-01-15 NOTE — PROGRESS NOTES
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  Type 2 endoleak trans lumbar embolization in Aug 2024 with LAVA    He is doing well overall. Some non specific left lower quadrant symptoms. No tenderness over the aorta aneurysm area.    CTA reviwed, very small endoleak in lumbar region, will get repeat non contrast CT scan to check on any size expansion.      Orders:  •  CT abdomen pelvis wo contrast; Future    Type II endoleak of aortic graft    Orders:  •  CT abdomen pelvis wo contrast; Future    Type 2 diabetes mellitus with other circulatory complication, without long-term current use of insulin ([facility])    Lab Results   Component Value Date    HGBA1C 5.8 (H) 2023              History of Present Illness   HPI  Brandon Carrizales is a 79 y.o. male who presents for follow up after type II EL glue embolization in Aug 2024.    Patient marcio a CTA abd/ pelvis on 24. Pt c/o sharp lower left groin / abd pain for the past few weeks. Pt denies change in appetite or back pain.      Pt is having hemorrhoid pain.    History obtained from: patient    Review of Systems   Constitutional:  Negative for appetite change and unexpected weight change.   HENT: Negative.     Eyes: Negative.    Respiratory: Negative.     Cardiovascular: Negative.    Gastrointestinal:  Positive for abdominal pain (lower). Negative for diarrhea, nausea and vomiting.   Endocrine: Negative.    Genitourinary: Negative.    Musculoskeletal: Negative.    Skin: Negative.    Allergic/Immunologic: Negative.    Neurological: Negative.    Hematological:  Negative.    Psychiatric/Behavioral: Negative.       Past Medical History   Past Medical History:   Diagnosis Date   • AAA (abdominal aortic aneurysm) (HCC)    • Colon polyp    • Diabetes mellitus (HCC)    • Diverticulosis    • GERD (gastroesophageal reflux disease)    • Hyperlipidemia    • Hypertension    • Sleep apnea     NOT DIAGNOSED     Past Surgical History:   Procedure Laterality Date   • AORTIC VALVE REPLACEMENT     • APPENDECTOMY     • CARDIAC SURGERY     • COLONOSCOPY     • CORONARY ARTERY BYPASS GRAFT     • IR AAA ENDOLEAK EMBOLIZATION FEMORAL ACCESS  07/06/2023   • IR AAA ENDOLEAK EMBOLIZATION TRANSLUMBAR ACCESS  8/8/2024   • LAPAROSCOPIC COLON RESECTION     • LEG SURGERY      SCHRAPNAL REMOVED FROM LOWER EXTREMITIES   • DE AAA REPAIR,AORTO-AORTIC TUBE PROSTH N/A 01/06/2017    Procedure: REPAIR ANEURYSM ENDOVASCULAR ABDOMINAL AORTIC  (EVAR);  Surgeon: Rubina Zhao MD;  Location: [facility];  Service: Vascular   • DE SLCTV CATHJ 3RD+ ORD SLCTV ABDL PEL/LXTR BRNC N/A 01/26/2018    Procedure: ANGIOGRAM; BILATERAL ILIAC ARTERY BALLOONING;  Surgeon: Rubina Zhao MD;  Location: [facility];  Service: Vascular   • UPPER GASTROINTESTINAL ENDOSCOPY     • VALVE REPLACEMENT       Family History   Problem Relation Age of Onset   • Colon cancer Mother    • Colon cancer Father       reports that he has quit smoking. His smoking use included cigarettes. He has a 30 pack-year smoking history. He has never used smokeless tobacco. He reports current alcohol use. He reports that he does not use drugs.  Current Outpatient Medications on File Prior to Visit   Medication Sig Dispense Refill   • acetaminophen (TYLENOL) 325 mg tablet Take 1 tablet every 6 hr as needed for pain. (Patient not taking: Reported on 1/12/2024) 30 tablet 0   • albuterol (PROVENTIL HFA,VENTOLIN HFA) 90 mcg/act inhaler INHALE 2 PUFFS BY MOUTH FOUR TIMES A DAY AS NEEDED FOR BREATHING     • aspirin 81 mg chewable tablet Chew 81 mg daily       •  "atorvastatin (LIPITOR) 40 mg tablet Take 40 mg by mouth daily Taking 1/2 tablet - 20 mg     • buPROPion (WELLBUTRIN XL) 150 mg 24 hr tablet 150 mg     • busPIRone (BUSPAR) 10 mg tablet 10 mg     • Empagliflozin 25 MG TABS 12.5 mg     • hydrochlorothiazide (HYDRODIURIL) 25 mg tablet Take 25 mg by mouth daily     • ketoconazole (NIZORAL) 2 % shampoo APPLY SHAMPOO TO WET SCALP BEFORE SHOWER EVERY OTHER DAY ASA SCALP TREATMENT. LEAVE ON FOR 5 TO10 MINUTES BEFORE RINSING. ALLOW TO RINSE OVE     • lisinopril (ZESTRIL) 10 mg tablet Lisinopril 10 MG Oral Tablet  TAKE 1 TABLET DAILY AS DIRECTED.   Quantity: 30;  Refills: 3       Rossi Senior;  Started 12-Nov-2015  Active     • metroNIDAZOLE (METROCREAM) 0.75 % cream APPLY EVERY MORNING TO FACE FOR ROSACEA CONTROL     • nicotine polacrilex (COMMIT) 2 MG lozenge DISSOLVE 1 LOZENGE BY MOUTH EVERY TWO HOURS AS NEEDED FOR SMOKING CESSATION (DO NOT SMOKE WHILE USING THE LOZENGE)     • oxybutynin (DITROPAN) 5 mg tablet Take 5 mg by mouth 2 (two) times a day      • tiotropium (SPIRIVA RESPIMAT) 2.5 MCG/ACT AERS inhaler INHALE 2 INHALATIONS (5MCG) BY MOUTH DAILY FOR COPD       No current facility-administered medications on file prior to visit.   No Known Allergies      Objective   /78 (BP Location: Left arm, Patient Position: Sitting)   Pulse 59   Resp 18   Ht 5' 9\" (1.753 m)   Wt 69.4 kg (153 lb)   SpO2 98%   BMI 22.59 kg/m²      Physical Exam  Vitals and nursing note reviewed.   Constitutional:       Appearance: Normal appearance.   HENT:      Head: Normocephalic and atraumatic.   Cardiovascular:      Rate and Rhythm: Normal rate.   Abdominal:      General: There is no distension.      Palpations: Abdomen is soft. There is no mass.      Tenderness: There is no abdominal tenderness. There is no guarding.      Hernia: No hernia is present.      Comments: Non tender over aneurysm area.  Left lower groin area slight tenderness.    No palpable hernia. "   Musculoskeletal:      Right lower leg: No edema.      Left lower leg: No edema.   Neurological:      Mental Status: He is alert.

## 2025-02-17 ENCOUNTER — HOSPITAL ENCOUNTER (OUTPATIENT)
Dept: CT IMAGING | Facility: HOSPITAL | Age: 80
Discharge: HOME | End: 2025-02-17
Attending: SURGERY
Payer: COMMERCIAL

## 2025-02-17 DIAGNOSIS — I71.40 ABDOMINAL AORTIC ANEURYSM (AAA) WITHOUT RUPTURE, UNSPECIFIED PART (HCC): ICD-10-CM

## 2025-02-17 DIAGNOSIS — I97.89 TYPE II ENDOLEAK OF AORTIC GRAFT: ICD-10-CM

## 2025-02-17 PROCEDURE — 74176 CT ABD & PELVIS W/O CONTRAST: CPT

## 2025-02-17 NOTE — PROGRESS NOTES
Cardiology Consultation     Brandon Carrizales  [id number]  1945  HEART & VASCULAR [facility] [place]  [address] [facility]  [facility] [place] [address]  1. Type 2 diabetes mellitus with other circulatory complication, without long-term current use of insulin ([facility])  Echo complete w/ contrast if indicated    POCT ECG      2. S/P AVR  Echo complete w/ contrast if indicated    POCT ECG      3. Hx of CABG  Echo complete w/ contrast if indicated    POCT ECG      4. Primary hypertension  Echo complete w/ contrast if indicated    POCT ECG      5. Pure hypercholesterolemia  Echo complete w/ contrast if indicated    POCT ECG      6. RBBB  Echo complete w/ contrast if indicated    POCT ECG      7. Bilateral carotid artery stenosis  Echo complete w/ contrast if indicated    POCT ECG      8. 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  Patient has HTN, PVD and HLD.  Patient has a #23 Magna Ease bovine pericardial AV placed for AV stenosis.   Bio AVR and SVG to the RCA performed 11/2015.  He is followed by Vascular surgery in reference to EVAR for a AAA 1/6/2017.  Patient had Lumbar embolization for type II endoleak,  8/2024.  Patient saw Vascular 1/15/2025 .  FU CTA 2/17/2025 showed grossly stable to minimally enlarged size of native aneurysmal sac.  Repeat EVAR duplex to be done 5/2025.  Echocardiogram 7/11/2022 demonstrated LVEF of 60% with mild LVH.  Mean  gradient across the AVR was 26 mmHg.  There was no change compared to 2/8/2021.  Peak velocity across the AV was 3.22 m/s.  He has a tobacco history and fortunately is in the process of discontinuing tobacco.  [facility] Chant[facility]  He is pretty much off tobacco.  He has felt better since doing this.  He has had no chest pain or significant dyspnea.  [facility].  His cholesterol has been good per patient.  EKG today demonstrates NSR at rate of 60 with first-degree AVB and RBBB.  There is no significant change compared to 7/10/2020 except for first-degree AVB.    PMH-  Past Medical History:   Diagnosis Date   • AAA (abdominal aortic aneurysm) (HCC)    • Colon polyp    • Diabetes mellitus (HCC)    • Diverticulosis    • GERD (gastroesophageal reflux disease)    • Hyperlipidemia    • Hypertension    • Sleep apnea     NOT DIAGNOSED        SOCIAL HISTORY-  Social History     Socioeconomic History   • Marital status: /Civil Union     Spouse name: Not on file   • Number of children: Not on file   • Years of education: Not on file   • Highest education level: Not on file   Occupational History   • Not on file   Tobacco Use   • Smoking status: Former     Current packs/day: 1.00     Average packs/day: 1 pack/day for 30.0 years (30.0 ttl pk-yrs)     Types: Cigarettes   • Smokeless tobacco: Never   Vaping Use   • Vaping status: Never Used   Substance and Sexual Activity   • Alcohol use: Yes     Comment: SOCIAL   • Drug use: No   • Sexual activity: Never   Other Topics Concern   • Not on file   Social History Narrative   • Not on file     Social Drivers of Health     Financial Resource Strain: Not on file   Food Insecurity: Not on file   Transportation Needs: Not on file   Physical Activity: Not on file   Stress: Not on file   Social Connections: Not on file   Intimate Partner Violence: Not on file   Housing Stability: Not on file        FAMILY  HISTORY-  Family History   Problem Relation Age of Onset   • Colon cancer Mother    • Colon cancer Father        SURGICAL HISTORY-  Past Surgical History:   Procedure Laterality Date   • AORTIC VALVE REPLACEMENT     • APPENDECTOMY     • CARDIAC SURGERY     • COLONOSCOPY     • CORONARY ARTERY BYPASS GRAFT     • IR AAA ENDOLEAK EMBOLIZATION FEMORAL ACCESS  07/06/2023   • IR AAA ENDOLEAK EMBOLIZATION TRANSLUMBAR ACCESS  8/8/2024   • LAPAROSCOPIC COLON RESECTION     • LEG SURGERY      SCHRAPNAL REMOVED FROM LOWER EXTREMITIES   • [facility] AAA REPAIR,AORTO-AORTIC TUBE PROSTH N/A 01/06/2017    Procedure: REPAIR ANEURYSM ENDOVASCULAR ABDOMINAL AORTIC  ([facility]);  Surgeon: Rubina Zhao MD;  Location: [facility];  Service: Vascular   • [facility] SLCTV CATHJ 3RD+ ORD SLCTV ABDL PEL/LXTR BRNCH N/A 01/26/2018    Procedure: ANGIOGRAM; BILATERAL ILIAC ARTERY BALLOONING;  Surgeon: Rubina Zhao MD;  Location: [facility];  Service: Vascular   • UPPER GASTROINTESTINAL ENDOSCOPY     • VALVE REPLACEMENT           Current Outpatient Medications:   •  albuterol (PROVENTIL HFA,VENTOLIN HFA) 90 mcg/act inhaler, INHALE 2 PUFFS BY MOUTH FOUR TIMES A DAY AS NEEDED FOR BREATHING, Disp: , Rfl:   •  aspirin 81 mg chewable tablet, Chew 81 mg daily  , Disp: , Rfl:   •  atorvastatin (LIPITOR) 40 mg tablet, Take 40 mg by mouth daily Taking 1/2 tablet - 20 mg, Disp: , Rfl:   •  buPROPion (WELLBUTRIN XL) 150 mg 24 hr tablet, 150 mg, Disp: , Rfl:   •  busPIRone (BUSPAR) 10 mg tablet, 10 mg, Disp: , Rfl:   •  Empagliflozin 25 MG TABS, 12.5 mg, Disp: , Rfl:   •  hydrochlorothiazide (HYDRODIURIL) 25 mg tablet, Take 25 mg by mouth daily, Disp: , Rfl:   •  ketoconazole (NIZORAL) 2 % shampoo, APPLY SHAMPOO TO WET SCALP BEFORE SHOWER EVERY OTHER DAY ASA SCALP TREATMENT. LEAVE ON FOR 5 TO10 MINUTES BEFORE RINSING. ALLOW TO RINSE OVE, Disp: , Rfl:   •  lisinopril (ZESTRIL) 10 mg tablet, Lisinopril 10 MG Oral Tablet TAKE 1 TABLET DAILY AS DIRECTED.  Quantity: 30;  Refills: 3  "   Rossi Senior PAC;  Started 12-Nov-2015 Active, Disp: , Rfl:   •  metroNIDAZOLE (METROCREAM) 0.75 % cream, APPLY EVERY MORNING TO FACE FOR ROSACEA CONTROL, Disp: , Rfl:   •  nicotine polacrilex (COMMIT) 2 MG lozenge, DISSOLVE 1 LOZENGE BY MOUTH EVERY TWO HOURS AS NEEDED FOR SMOKING CESSATION (DO NOT SMOKE WHILE USING THE LOZENGE), Disp: , Rfl:   •  oxybutynin (DITROPAN) 5 mg tablet, Take 5 mg by mouth 2 (two) times a day , Disp: , Rfl:   •  potassium chloride (Klor-Con M10) 10 mEq tablet, Take 10 mEq by mouth, Disp: , Rfl:   •  tiotropium (SPIRIVA RESPIMAT) 2.5 MCG/ACT AERS inhaler, INHALE 2 INHALATIONS (5MCG) BY MOUTH DAILY FOR COPD, Disp: , Rfl:   •  acetaminophen (TYLENOL) 325 mg tablet, Take 1 tablet every 6 hr as needed for pain. (Patient not taking: Reported on 1/12/2024), Disp: 30 tablet, Rfl: 0  No Known Allergies  Vitals:    02/27/25 1050   BP: 138/54   BP Location: Left arm   Patient Position: Sitting   Cuff Size: Standard   Pulse: 60   SpO2: 99%   Weight: 71.2 kg (157 lb)   Height: 5' 9\" (1.753 m)         Review of Systems:  Review of Systems   All other systems reviewed and are negative.      Physical Exam:  Physical Exam  Vitals reviewed.   Constitutional:       Appearance: He is well-developed.   HENT:      Head: Normocephalic and atraumatic.   Cardiovascular:      Rate and Rhythm: Normal rate.      Heart sounds: Murmur heard.      Comments: S1-S2 with early to mid peaking systolic murmur heard at the right upper sternal border  Pulmonary:      Effort: Pulmonary effort is normal.      Breath sounds: Normal breath sounds.   Musculoskeletal:      Cervical back: Normal range of motion.   Skin:     General: Skin is warm and dry.   Neurological:      Mental Status: He is alert and oriented to person, place, and time.       Discussion/Summary:I will continue the patient's present medical regimen.  The patient appears well compensated.  I have asked the patient to call if there is a problem in the " interim otherwise I will see the patient in one years time. Patient is due for an echocardiogram prior to the next visit.

## 2025-02-21 ENCOUNTER — RESULTS FOLLOW-UP (OUTPATIENT)
Dept: OTHER | Facility: HOSPITAL | Age: 80
End: 2025-02-21

## 2025-02-21 DIAGNOSIS — I71.43 INFRARENAL ABDOMINAL AORTIC ANEURYSM (AAA) WITHOUT RUPTURE (HCC): Primary | ICD-10-CM

## 2025-02-21 DIAGNOSIS — I97.89 TYPE II ENDOLEAK OF AORTIC GRAFT: ICD-10-CM

## 2025-02-27 ENCOUNTER — OFFICE VISIT (OUTPATIENT)
Dept: CARDIOLOGY CLINIC | Facility: CLINIC | Age: 80
End: 2025-02-27
Payer: COMMERCIAL

## 2025-02-27 VITALS
HEART RATE: 60 BPM | WEIGHT: 157 LBS | HEIGHT: 69 IN | DIASTOLIC BLOOD PRESSURE: 54 MMHG | BODY MASS INDEX: 23.25 KG/M2 | SYSTOLIC BLOOD PRESSURE: 138 MMHG | OXYGEN SATURATION: 99 %

## 2025-02-27 DIAGNOSIS — I97.89 TYPE II ENDOLEAK OF AORTIC GRAFT: ICD-10-CM

## 2025-02-27 DIAGNOSIS — E11.59 TYPE 2 DIABETES MELLITUS WITH OTHER CIRCULATORY COMPLICATION, WITHOUT LONG-TERM CURRENT USE OF INSULIN (HCC): Primary | ICD-10-CM

## 2025-02-27 DIAGNOSIS — I65.23 BILATERAL CAROTID ARTERY STENOSIS: ICD-10-CM

## 2025-02-27 DIAGNOSIS — E78.00 PURE HYPERCHOLESTEROLEMIA: ICD-10-CM

## 2025-02-27 DIAGNOSIS — Z95.1 HX OF CABG: ICD-10-CM

## 2025-02-27 DIAGNOSIS — I45.10 RBBB: ICD-10-CM

## 2025-02-27 DIAGNOSIS — I10 PRIMARY HYPERTENSION: ICD-10-CM

## 2025-02-27 DIAGNOSIS — Z95.2 S/P AVR: ICD-10-CM

## 2025-02-27 PROCEDURE — 93000 ELECTROCARDIOGRAM COMPLETE: CPT | Performed by: INTERNAL MEDICINE

## 2025-02-27 PROCEDURE — 99214 OFFICE O/P EST MOD 30 MIN: CPT | Performed by: INTERNAL MEDICINE

## 2025-02-27 RX ORDER — POTASSIUM CHLORIDE 750 MG/1
10 TABLET, EXTENDED RELEASE ORAL
COMMUNITY
Start: 2024-11-29

## 2025-02-27 NOTE — PATIENT INSTRUCTIONS
I will continue the patient's present medical regimen.  The patient appears well compensated.  I have asked the patient to call if there is a problem in the interim otherwise I will see the patient in one years time. Patient is due for an echocardiogram prior to the next visit.

## 2025-03-05 ENCOUNTER — TELEPHONE (OUTPATIENT)
Age: 80
End: 2025-03-05

## 2025-03-05 NOTE — TELEPHONE ENCOUNTER
Pt has a recall placed for a 1 year follow up office visit. Called and spoke with pt and his wife, they informed me that he is scheduled for an echo and should not be scheduled for any procedure until after. Got cut off and disconnected before I could let her know it is just an office visit no procedure. I did try to call back, but had to leave a message. If pt calls back please offer first available office visit. Thank you.

## 2025-03-13 ENCOUNTER — HOSPITAL ENCOUNTER (OUTPATIENT)
Dept: NON INVASIVE DIAGNOSTICS | Facility: CLINIC | Age: 80
Discharge: HOME | End: 2025-03-13
Payer: COMMERCIAL

## 2025-03-13 VITALS
SYSTOLIC BLOOD PRESSURE: 138 MMHG | HEIGHT: 69 IN | DIASTOLIC BLOOD PRESSURE: 54 MMHG | WEIGHT: 157 LBS | HEART RATE: 60 BPM | BODY MASS INDEX: 23.25 KG/M2

## 2025-03-13 DIAGNOSIS — E78.00 PURE HYPERCHOLESTEROLEMIA: ICD-10-CM

## 2025-03-13 DIAGNOSIS — I97.89 TYPE II ENDOLEAK OF AORTIC GRAFT: ICD-10-CM

## 2025-03-13 DIAGNOSIS — I65.23 BILATERAL CAROTID ARTERY STENOSIS: ICD-10-CM

## 2025-03-13 DIAGNOSIS — I10 PRIMARY HYPERTENSION: ICD-10-CM

## 2025-03-13 DIAGNOSIS — E11.59 TYPE 2 DIABETES MELLITUS WITH OTHER CIRCULATORY COMPLICATION, WITHOUT LONG-TERM CURRENT USE OF INSULIN (HCC): ICD-10-CM

## 2025-03-13 DIAGNOSIS — Z95.2 S/P AVR: ICD-10-CM

## 2025-03-13 DIAGNOSIS — Z95.1 HX OF CABG: ICD-10-CM

## 2025-03-13 DIAGNOSIS — I45.10 RBBB: ICD-10-CM

## 2025-03-13 LAB
AORTIC ROOT: 2.7 CM
AORTIC VALVE MEAN VELOCITY: 18.9 M/S
ASCENDING AORTA: 2.9 CM
AV AREA BY CONTINUOUS VTI: 0.9 CM2
AV AREA PEAK VELOCITY: 0.8 CM2
AV LVOT MEAN GRADIENT: 1 MMHG
AV LVOT PEAK GRADIENT: 2 MMHG
AV MEAN PRESS GRAD SYS DOP V1V2: 16 MMHG
AV ORIFICE AREA US: 0.93 CM2
AV PEAK GRADIENT: 29 MMHG
AV VELOCITY RATIO: 0.3
AV VMAX SYS DOP: 2.71 M/S
BSA FOR ECHO PROCEDURE: 1.86 M2
DOP CALC AO VTI: 61.88 CM
DOP CALC LVOT AREA: 3.14 CM2
DOP CALC LVOT CARDIAC INDEX: 1.78 L/MIN/M2
DOP CALC LVOT CARDIAC OUTPUT: 3.31 L/MIN
DOP CALC LVOT DIAMETER: 2 CM
DOP CALC LVOT PEAK VEL VTI: 18.27 CM
DOP CALC LVOT PEAK VEL: 0.71 M/S
DOP CALC LVOT STROKE INDEX: 31.2 ML/M2
DOP CALC LVOT STROKE VOLUME: 57.37
E WAVE DECELERATION TIME: 331 MS
E/A RATIO: 0.87
FRACTIONAL SHORTENING: 31 (ref 28–44)
INTERVENTRICULAR SEPTUM IN DIASTOLE (PARASTERNAL SHORT AXIS VIEW): 1.5 CM
INTERVENTRICULAR SEPTUM: 1.5 CM (ref 0.6–1.1)
LAAS-AP2: 26.3 CM2
LAAS-AP4: 22.4 CM2
LEFT ATRIUM SIZE: 4.8 CM
LEFT ATRIUM VOLUME (MOD BIPLANE): 82 ML
LEFT ATRIUM VOLUME INDEX (MOD BIPLANE): 44.1 ML/M2
LEFT INTERNAL DIMENSION IN SYSTOLE: 3.1 CM (ref 2.1–4)
LEFT VENTRICULAR INTERNAL DIMENSION IN DIASTOLE: 4.5 CM (ref 3.5–6)
LEFT VENTRICULAR POSTERIOR WALL IN END DIASTOLE: 1.2 CM
LEFT VENTRICULAR STROKE VOLUME: 57 ML
LV EF US.2D.A4C+ESTIMATED: 49 %
LVSV (TEICH): 57 ML
MV E'TISSUE VEL-LAT: 7 CM/S
MV E'TISSUE VEL-SEP: 7 CM/S
MV PEAK A VEL: 0.71 M/S
MV PEAK E VEL: 62 CM/S
MV STENOSIS PRESSURE HALF TIME: 96 MS
MV VALVE AREA P 1/2 METHOD: 2.29
RA PRESSURE ESTIMATED: 8 MMHG
RIGHT ATRIUM AREA SYSTOLE A4C: 23 CM2
RIGHT VENTRICLE ID DIMENSION: 4.3 CM
RV PSP: 27 MMHG
SL CV LEFT ATRIUM LENGTH A2C: 5.8 CM
SL CV LV EF: 60
SL CV PED ECHO LEFT VENTRICLE DIASTOLIC VOLUME (MOD BIPLANE) 2D: 95 ML
SL CV PED ECHO LEFT VENTRICLE SYSTOLIC VOLUME (MOD BIPLANE) 2D: 38 ML
TR MAX PG: 19 MMHG
TR PEAK VELOCITY: 2.2 M/S
TRICUSPID ANNULAR PLANE SYSTOLIC EXCURSION: 2 CM
TRICUSPID VALVE PEAK REGURGITATION VELOCITY: 2.19 M/S

## 2025-03-13 PROCEDURE — 93306 TTE W/DOPPLER COMPLETE: CPT

## 2025-03-13 PROCEDURE — 93306 TTE W/DOPPLER COMPLETE: CPT | Performed by: INTERNAL MEDICINE

## 2025-03-21 ENCOUNTER — TELEPHONE (OUTPATIENT)
Age: 80
End: 2025-03-21

## 2025-03-21 NOTE — TELEPHONE ENCOUNTER
This is he 2nd attempt to contact the pt to schedule a 1 year fu ov.  Left a message for a return call  and I will also send a reminder letter.

## 2025-03-24 ENCOUNTER — TELEPHONE (OUTPATIENT)
Age: 80
End: 2025-03-24

## 2025-03-24 NOTE — TELEPHONE ENCOUNTER
Caller: Katy - patient's spouse    Doctor: Dr. Polo    Reason for call: Patient's wife called requesting a call back with the results from the patient's Echo done on 3/13/25.     Call back#: [phone]

## 2025-05-29 ENCOUNTER — HOSPITAL ENCOUNTER (OUTPATIENT)
Dept: VASCULAR ULTRASOUND | Facility: HOSPITAL | Age: 80
Discharge: HOME | End: 2025-05-29
Attending: SURGERY
Payer: COMMERCIAL

## 2025-05-29 DIAGNOSIS — I71.43 INFRARENAL ABDOMINAL AORTIC ANEURYSM (AAA) WITHOUT RUPTURE (HCC): ICD-10-CM

## 2025-05-29 DIAGNOSIS — I97.89 TYPE II ENDOLEAK OF AORTIC GRAFT: ICD-10-CM

## 2025-05-29 PROCEDURE — 93978 VASCULAR STUDY: CPT

## 2025-05-29 PROCEDURE — 93923 UPR/LXTR ART STDY 3+ LVLS: CPT

## 2025-05-30 PROCEDURE — 93978 VASCULAR STUDY: CPT | Performed by: SURGERY

## 2025-06-02 ENCOUNTER — TRANSCRIBE ORDERS (OUTPATIENT)
Dept: VASCULAR SURGERY | Facility: CLINIC | Age: 80
End: 2025-06-02

## 2025-06-02 DIAGNOSIS — I71.40 ABDOMINAL AORTIC ANEURYSM (AAA) WITHOUT RUPTURE, UNSPECIFIED PART (HCC): Primary | ICD-10-CM

## 2025-08-04 ENCOUNTER — TELEPHONE (OUTPATIENT)
Age: 80
End: 2025-08-04

## (undated) DEVICE — IV SET 15 DROP STERILE 0/Y GRAVITY

## (undated) DEVICE — TUBING INJECTOR HIGH PRESSURE 91051482

## (undated) DEVICE — 1200CC GUARDIAN II: Brand: GUARDIAN

## (undated) DEVICE — DILATOR: Brand: COOK

## (undated) DEVICE — RADIFOCUS TORQUE DEVICE MULTI-TORQUE VISE: Brand: RADIFOCUS TORQUE DEVICE

## (undated) DEVICE — SNAP KOVER: Brand: UNBRANDED

## (undated) DEVICE — NON-DEHP HIGH FLOW RATE EXTENSION SET, MALE LUER LOCK ADAPTER

## (undated) DEVICE — 3000CC GUARDIAN II: Brand: GUARDIAN

## (undated) DEVICE — CATH BAL CHARGER 12 X 40MM X 75CM

## (undated) DEVICE — Device

## (undated) DEVICE — PERCLOSE PROGLIDE™ SUTURE-MEDIATED CLOSURE SYSTEM: Brand: PERCLOSE PROGLIDE™

## (undated) DEVICE — FLUID MANAGEMENT KIT - IR

## (undated) DEVICE — CATH DIAG 5FR .035 65CM 6S OMMI-FLUSH

## (undated) DEVICE — MICROPUNCTURE 501

## (undated) DEVICE — 4 F TEMPO AQUA 0.038  65CM VER: Brand: TEMPO AQUA

## (undated) DEVICE — STOPCOCK 4-WAY

## (undated) DEVICE — CATH BAL OCCLUSION CODA 34

## (undated) DEVICE — CATH SIZING 5FR 100CM PERFORMA 2 BAND

## (undated) DEVICE — COVER PROBE INTRAOPERATIVE 6 X 96 IN

## (undated) DEVICE — PINNACLE R/O II INTRODUCER SHEATH WITH RADIOPAQUE MARKER: Brand: PINNACLE

## (undated) DEVICE — RADIFOCUS GLIDEWIRE: Brand: GLIDEWIRE

## (undated) DEVICE — INFLATION DEVICE BASIX 30ATM

## (undated) DEVICE — INFUSER BAG 500ML

## (undated) DEVICE — PROXIMATE PLUS MD MULTI-DIRECTIONAL RELEASE SKIN STAPLERS CONTAINS 35 STAINLESS STEEL STAPLES APPROXIMATE CLOSED DIMENSIONS: 6.9MM X 3.9MM WIDE: Brand: PROXIMATE

## (undated) DEVICE — ADHESIVE SKN CLSR HISTOACRYL FLEX 0.5ML LF

## (undated) DEVICE — GLOVE SRG BIOGEL 7.5

## (undated) DEVICE — MICROPUNCTURE INTRODUCER SET SILHOUETTE TRANSITIONLESS PUSH-PLUS DESIGN - STIFFENED CANNULA WITH NITINOL WIRE GUIDE: Brand: MICROPUNCTURE

## (undated) DEVICE — REM POLYHESIVE ADULT PATIENT RETURN ELECTRODE: Brand: VALLEYLAB

## (undated) DEVICE — GUIDEWIRE AMPLATZ .035 260CM 6CM ST SS

## (undated) DEVICE — SI 9F BRITE TIP SHEATH 11CM: Brand: BRITE TIP

## (undated) DEVICE — TRAY FOLEY 16FR URIMETER SURESTEP

## (undated) DEVICE — SYRINGE 50ML LL

## (undated) DEVICE — X-RAY DETECTABLE SPONGES,16 PLY: Brand: VISTEC

## (undated) DEVICE — SI BRITE TIP 7F 11CM STR: Brand: BRITE TIP

## (undated) DEVICE — SYRINGE KIT,PACKAGED,,150FT,MK 7(ANGIO-ARTERION, 150ML SYR KIT W/QFT,MC)(60729385): Brand: MEDRAD® MARK 7 ARTERION DISPOSABLE SYRINGE 150 ML WITH QUICK FILL TUBE

## (undated) DEVICE — LIGHT GLOVE GREEN

## (undated) DEVICE — PACK AORTIC ANUERYSM PBDS

## (undated) DEVICE — SI BRITE TIP 8F 11CM STR: Brand: BRITE TIP

## (undated) DEVICE — INTENDED FOR TISSUE SEPARATION, AND OTHER PROCEDURES THAT REQUIRE A SHARP SURGICAL BLADE TO PUNCTURE OR CUT.: Brand: BARD-PARKER SAFETY BLADES SIZE 15, STERILE